# Patient Record
Sex: FEMALE | Race: WHITE | NOT HISPANIC OR LATINO | Employment: OTHER | URBAN - METROPOLITAN AREA
[De-identification: names, ages, dates, MRNs, and addresses within clinical notes are randomized per-mention and may not be internally consistent; named-entity substitution may affect disease eponyms.]

---

## 2017-01-25 RX ORDER — TRAMADOL HYDROCHLORIDE 50 MG/1
TABLET ORAL
Qty: 60 TAB | Refills: 0 | Status: SHIPPED | OUTPATIENT
Start: 2017-01-25 | End: 2017-02-13 | Stop reason: SDUPTHER

## 2017-01-27 ENCOUNTER — HOSPITAL ENCOUNTER (OUTPATIENT)
Dept: LAB | Facility: MEDICAL CENTER | Age: 77
End: 2017-01-27
Attending: INTERNAL MEDICINE
Payer: MEDICARE

## 2017-01-27 DIAGNOSIS — I10 ESSENTIAL HYPERTENSION, BENIGN: ICD-10-CM

## 2017-01-27 DIAGNOSIS — I51.7 CARDIOMEGALY: ICD-10-CM

## 2017-01-27 DIAGNOSIS — I35.0 AORTIC STENOSIS, SEVERE: ICD-10-CM

## 2017-01-27 DIAGNOSIS — I51.7 LVH (LEFT VENTRICULAR HYPERTROPHY): ICD-10-CM

## 2017-01-27 DIAGNOSIS — N28.9 RENAL INSUFFICIENCY: ICD-10-CM

## 2017-01-27 LAB
ALBUMIN SERPL BCP-MCNC: 5.2 G/DL (ref 3.2–4.9)
ALBUMIN/GLOB SERPL: 1.8 G/DL
ALP SERPL-CCNC: 132 U/L (ref 30–99)
ALT SERPL-CCNC: 11 U/L (ref 2–50)
ANION GAP SERPL CALC-SCNC: 8 MMOL/L (ref 0–11.9)
AST SERPL-CCNC: 20 U/L (ref 12–45)
BILIRUB SERPL-MCNC: 0.5 MG/DL (ref 0.1–1.5)
BUN SERPL-MCNC: 23 MG/DL (ref 8–22)
CALCIUM SERPL-MCNC: 9.7 MG/DL (ref 8.5–10.5)
CHLORIDE SERPL-SCNC: 108 MMOL/L (ref 96–112)
CHOLEST SERPL-MCNC: 115 MG/DL (ref 100–199)
CO2 SERPL-SCNC: 22 MMOL/L (ref 20–33)
CREAT SERPL-MCNC: 0.84 MG/DL (ref 0.5–1.4)
CREAT SERPL-MCNC: 0.85 MG/DL (ref 0.5–1.4)
GLOBULIN SER CALC-MCNC: 2.9 G/DL (ref 1.9–3.5)
GLUCOSE SERPL-MCNC: 90 MG/DL (ref 65–99)
HDLC SERPL-MCNC: 40 MG/DL
LDLC SERPL CALC-MCNC: 53 MG/DL
POTASSIUM SERPL-SCNC: 3.7 MMOL/L (ref 3.6–5.5)
POTASSIUM SERPL-SCNC: 3.7 MMOL/L (ref 3.6–5.5)
PROT SERPL-MCNC: 8.1 G/DL (ref 6–8.2)
SODIUM SERPL-SCNC: 138 MMOL/L (ref 135–145)
TRIGL SERPL-MCNC: 109 MG/DL (ref 0–149)

## 2017-01-27 PROCEDURE — 80053 COMPREHEN METABOLIC PANEL: CPT

## 2017-01-27 PROCEDURE — 80061 LIPID PANEL: CPT

## 2017-02-13 ENCOUNTER — OFFICE VISIT (OUTPATIENT)
Dept: MEDICAL GROUP | Facility: CLINIC | Age: 77
End: 2017-02-13
Payer: MEDICARE

## 2017-02-13 VITALS
OXYGEN SATURATION: 96 % | HEIGHT: 60 IN | DIASTOLIC BLOOD PRESSURE: 70 MMHG | SYSTOLIC BLOOD PRESSURE: 130 MMHG | WEIGHT: 120 LBS | RESPIRATION RATE: 16 BRPM | TEMPERATURE: 98.2 F | HEART RATE: 63 BPM | BODY MASS INDEX: 23.56 KG/M2

## 2017-02-13 DIAGNOSIS — R63.8 INCREASED BMI: ICD-10-CM

## 2017-02-13 DIAGNOSIS — E78.5 DYSLIPIDEMIA: ICD-10-CM

## 2017-02-13 DIAGNOSIS — I35.0 SEVERE AORTIC STENOSIS: ICD-10-CM

## 2017-02-13 DIAGNOSIS — M79.2 NEUROPATHIC PAIN: ICD-10-CM

## 2017-02-13 DIAGNOSIS — G43.909 MIGRAINE WITHOUT STATUS MIGRAINOSUS, NOT INTRACTABLE, UNSPECIFIED MIGRAINE TYPE: ICD-10-CM

## 2017-02-13 DIAGNOSIS — K21.9 GASTROESOPHAGEAL REFLUX DISEASE WITHOUT ESOPHAGITIS: ICD-10-CM

## 2017-02-13 DIAGNOSIS — I10 ESSENTIAL HYPERTENSION: ICD-10-CM

## 2017-02-13 PROCEDURE — 99214 OFFICE O/P EST MOD 30 MIN: CPT | Performed by: INTERNAL MEDICINE

## 2017-02-13 PROCEDURE — G8420 CALC BMI NORM PARAMETERS: HCPCS | Performed by: INTERNAL MEDICINE

## 2017-02-13 PROCEDURE — 1101F PT FALLS ASSESS-DOCD LE1/YR: CPT | Performed by: INTERNAL MEDICINE

## 2017-02-13 PROCEDURE — 4040F PNEUMOC VAC/ADMIN/RCVD: CPT | Performed by: INTERNAL MEDICINE

## 2017-02-13 PROCEDURE — 1036F TOBACCO NON-USER: CPT | Performed by: INTERNAL MEDICINE

## 2017-02-13 PROCEDURE — G8482 FLU IMMUNIZE ORDER/ADMIN: HCPCS | Performed by: INTERNAL MEDICINE

## 2017-02-13 PROCEDURE — G8432 DEP SCR NOT DOC, RNG: HCPCS | Performed by: INTERNAL MEDICINE

## 2017-02-13 RX ORDER — GABAPENTIN 600 MG/1
600 TABLET ORAL 3 TIMES DAILY
Qty: 90 TAB | Refills: 6 | Status: SHIPPED | OUTPATIENT
Start: 2017-02-13 | End: 2017-08-04 | Stop reason: SDUPTHER

## 2017-02-13 RX ORDER — OMEPRAZOLE 20 MG/1
20 TABLET, DELAYED RELEASE ORAL DAILY
Qty: 90 TAB | Refills: 3 | Status: SHIPPED | OUTPATIENT
Start: 2017-02-13 | End: 2018-01-19

## 2017-02-13 RX ORDER — SUMATRIPTAN 25 MG/1
25-100 TABLET, FILM COATED ORAL
Qty: 9 TAB | Refills: 3 | Status: SHIPPED | OUTPATIENT
Start: 2017-02-13 | End: 2017-07-09 | Stop reason: SDUPTHER

## 2017-02-13 RX ORDER — ATENOLOL AND CHLORTHALIDONE TABLET 100; 25 MG/1; MG/1
1 TABLET ORAL DAILY
Qty: 90 TAB | Refills: 6 | Status: SHIPPED | OUTPATIENT
Start: 2017-02-13 | End: 2017-10-31 | Stop reason: SDUPTHER

## 2017-02-13 RX ORDER — GEMFIBROZIL 600 MG/1
600 TABLET, FILM COATED ORAL 2 TIMES DAILY
Qty: 180 TAB | Refills: 3 | Status: SHIPPED | OUTPATIENT
Start: 2017-02-13 | End: 2018-01-12 | Stop reason: SDUPTHER

## 2017-02-13 RX ORDER — TRAMADOL HYDROCHLORIDE 50 MG/1
50 TABLET ORAL EVERY 8 HOURS PRN
Qty: 60 TAB | Refills: 3 | Status: SHIPPED | OUTPATIENT
Start: 2017-02-13 | End: 2017-03-01 | Stop reason: SDUPTHER

## 2017-02-13 ASSESSMENT — PATIENT HEALTH QUESTIONNAIRE - PHQ9: CLINICAL INTERPRETATION OF PHQ2 SCORE: 0

## 2017-02-13 NOTE — MR AVS SNAPSHOT
Tori Mcgowande   2017 9:40 AM   Office Visit   MRN: [id number]    Department:  [facility]   Dept Phone:  [phone]    Description:  Female : 1940   Provider:  Elizabeth Joyce M.D.           Allergies as of 2017     No Known Allergies      You were diagnosed with     Migraine without status migrainosus, not intractable, unspecified migraine type   [3001623]       Dyslipidemia   [376794]       Gastroesophageal reflux disease without esophagitis   [169823]       Neuropathic pain   [123978]       Essential hypertension   [9841616]         Vital Signs     Blood Pressure Pulse Temperature Respirations Height Weight    130/70 mmHg 63 36.8 °C (98.2 °F) 16 1.524 m (5') 54.432 kg (120 lb)    Body Mass Index Oxygen Saturation Smoking Status             23.44 kg/m2 96% Never Smoker          Basic Information     Date Of Birth Sex Race Ethnicity Preferred Language    1940 Female White Non- English      Your appointments     2017  3:15 PM   FOLLOW UP with David Ball M.D.   [facility] (--)    [address] [place] [id number] [phone]            Aug 04, 2017  9:20 AM   Established Patient with Elizabeth Joyce M.D.   [facility] ([facility])    [address] [facility] [address] [phone]           You will be receiving a confirmation call a few days before your appointment from our automated call confirmation system.              Problem List              ICD-10-CM Priority Class Noted - Resolved    Cardiomegaly I51.7   2013 - Present    Essential hypertension, benign I10   2013 - Present    Restless legs syndrome G25.81   2013 - Present    Dyslipidemia E78.5   3/4/2016 - Present    Aortic stenosis, severe I35.0   3/4/2016 - Present    Headache, migraine G43.909   3/4/2016 - Present    Health care maintenance Z00.00   3/4/2016 - Present    Combined forms of  age-related cataract of right eye H25.811   10/11/2016 - Present    Combined forms of age-related cataract of left eye H25.812   11/8/2016 - Present      Health Maintenance        Date Due Completion Dates    IMM DTaP/Tdap/Td Vaccine (1 - Tdap) 6/17/1958 ---    PAP SMEAR 6/17/1960 ---    MAMMOGRAM 6/17/1979 ---    COLONOSCOPY 6/17/1989 ---    IMM ZOSTER VACCINE 6/17/1999 ---    BONE DENSITY 6/17/2004 ---    IMM PNEUMOCOCCAL 65+ (ADULT) LOW/MEDIUM RISK SERIES (2 of 2 - PPSV23) 10/3/2017 10/3/2016            Current Immunizations     13-VALENT PCV PREVNAR 10/3/2016  7:39 AM    Influenza Vaccine Adult HD 9/8/2015 10:28 AM    Influenza Vaccine Quad Inj (Preserved) 9/5/2016      Below and/or attached are the medications your provider expects you to take. Review all of your home medications and newly ordered medications with your provider and/or pharmacist. Follow medication instructions as directed by your provider and/or pharmacist. Please keep your medication list with you and share with your provider. Update the information when medications are discontinued, doses are changed, or new medications (including over-the-counter products) are added; and carry medication information at all times in the event of emergency situations     Allergies:  No Known Allergies          Medications  Valid as of: February 13, 2017 -  9:57 AM    Generic Name Brand Name Tablet Size Instructions for use    Aspirin (Tablet Delayed Response) ECOTRIN 81 MG Take 81 mg by mouth every day.          Atenolol-Chlorthalidone (Tab) TENORETIC 100-25 MG Take 1 Tab by mouth every day.        Chlorphen-Pseudoeph-Methscop   Take  by mouth.        Cholecalciferol   Take 1 Cap by mouth every day.          Cyanocobalamin   Take  by mouth.        Esomeprazole Magnesium (CAPSULE DELAYED RELEASE) NEXIUM 20 MG Take 20 mg by mouth every morning before breakfast.        Gabapentin (Tab) NEURONTIN 600 MG Take 1 Tab by mouth 3 times a day.        Gemfibrozil (Tab)  LOPID 600 MG Take 1 Tab by mouth 2 times a day.        Multiple Vitamin (Tab) MULTI-VITAMIN  Take 1 Tab by mouth every day.          Omega-3 Fatty Acids   Take 1 Cap by mouth every day.          Omeprazole Magnesium (Tablet Delayed Response) PRILOSEC OTC 20 MG Take 1 Tab by mouth every day.        Potassium   Take 1 Tab by mouth every day.          SUMAtriptan Succinate (Tab) IMITREX 25 MG Take 1-4 Tabs by mouth Once PRN for Migraine for up to 1 dose.        TiZANidine HCl (Tab) ZANAFLEX 2 MG TAKE TWO TABLETS BY MOUTH TWICE DAILY        TraMADol HCl (Tab) ULTRAM 50 MG Take 1 Tab by mouth every 8 hours as needed.        Vitamin A   Take 1 Tab by mouth every day.          .                 Medicines prescribed today were sent to:     [id number] - [place], [place] - [address] [place] [address]    Phone: [phone] Fax: [phone]    Open 24 Hours?: No      Medication refill instructions:       If your prescription bottle indicates you have medication refills left, it is not necessary to call your provider’s office. Please contact your pharmacy and they will refill your medication.    If your prescription bottle indicates you do not have any refills left, you may request refills at any time through one of the following ways: The online [employer] system (except Urgent Care), by calling your provider’s office, or by asking your pharmacy to contact your provider’s office with a refill request. Medication refills are processed only during regular business hours and may not be available until the next business day. Your provider may request additional information or to have a follow-up visit with you prior to refilling your medication.   *Please Note: Medication refills are assigned a new Rx number when refilled electronically. Your pharmacy may indicate that no refills were authorized even though a new prescription for the same medication is available at the pharmacy. Please request the  medicine by name with the pharmacy before contacting your provider for a refill.           MyChart Status: Patient Declined

## 2017-02-13 NOTE — PROGRESS NOTES
Subjective:  Tori is a 76 y.o. female with the following   Past Medical History   Diagnosis Date   • GERD (gastroesophageal reflux disease)    • Restless leg syndrome      Dr. adler    • Urinary tract infection, site not specified    • Hypertension    • Hyperlipidemia    • Heart murmur    • Aortic stenosis    • Arthritis    • Dental disorder    • Breath shortness    • Stroke (CMS-HCC)      had in 1970s   • Pain 10/7/16     lower back, hips and legs      Family History   Problem Relation Age of Onset   • Adopted: Yes     The patient is on the following medications:   Current outpatient prescriptions:   •  tramadol (ULTRAM) 50 MG Tab, TAKE ONE OR TWO TABLETS BY MOUTH TWICE DAILY AS NEEDED, Disp: 60 Tab, Rfl: 0  •  SUMAtriptan (IMITREX) 25 MG Tab tablet, TAKE 1 TABLET BY MOUTH ONCE AS NEEDED FOR MIGRAINE FOR UP TO 1 DOSE, Disp: 9 Tab, Rfl: 0  •  tizanidine (ZANAFLEX) 2 MG tablet, TAKE TWO TABLETS BY MOUTH TWICE DAILY, Disp: 60 Tab, Rfl: 2  •  gabapentin (NEURONTIN) 600 MG tablet, TAKE ONE TABLET BY MOUTH THREE TIMES DAILY, Disp: 90 Tab, Rfl: 3  •  esomeprazole (NEXIUM) 20 MG capsule, Take 20 mg by mouth every morning before breakfast., Disp: , Rfl:   •  Chlorphen-Pseudoeph-Methscop (ALLERGY AM/PM PO), Take  by mouth., Disp: , Rfl:   •  atenolol-chlorthalidone (TENORETIC) 100-25 MG per tablet, Take 1 Tab by mouth every day., Disp: 90 Tab, Rfl: 3  •  gemfibrozil (LOPID) 600 MG Tab, Take 1 Tab by mouth 2 times a day., Disp: 180 Tab, Rfl: 3  •  omeprazole (PRILOSEC OTC) 20 MG tablet, Take 1 Tab by mouth every day., Disp: 90 Tab, Rfl: 3  •  Cyanocobalamin (VITAMIN B 12 PO), Take  by mouth., Disp: , Rfl:   •  aspirin EC (ECOTRIN) 81 MG TBEC, Take 81 mg by mouth every day.  , Disp: , Rfl:   •  Cholecalciferol (VITAMIN D-3 PO), Take 1 Cap by mouth every day.  , Disp: , Rfl:   •  Omega-3 Fatty Acids (FISH OIL PO), Take 1 Cap by mouth every day.  , Disp: , Rfl:   •  Multiple Vitamin (MULTI-VITAMIN) TABS, Take 1 Tab by mouth  every day.  , Disp: , Rfl:   •  POTASSIUM PO, Take 1 Tab by mouth every day.  , Disp: , Rfl:   •  VITAMIN A PO, Take 1 Tab by mouth every day.  , Disp: , Rfl:     HPI; Patient is here today for follow-up visit regarding her recent labs, currently on gabapentin, tramadol and Zanaflex as needed for chronic arthritic and back pain denies any motor or sensory loss, on Tenoretic for hypertension denies having palpitation or urinary frequency, on omeprazole for GERD denies having dysphagia or abdominal pain, on Lopid for dyslipidemia denies having fatigue. Patient has severe aortic stenosis, has decided against any invasive procedure denies having shortness of breath with ambulation.   ROS:  See HPI    Blood pressure 130/70, pulse 63, temperature 36.8 °C (98.2 °F), resp. rate 16, height 1.524 m (5'), weight 54.432 kg (120 lb), SpO2 96 %.on RA  Objective:  Patient is well appearing and in no acute distress.  Pharynx is clear.  Neck is soft and supple with no cervical or supraclavicular lymphadenopathy, thyromegaly or masses, no JVD.  Lungs clear to auscultation bilaterally with normal respiratory effort. Abdomen soft, non-tender on palpation,not distended. Heart regular rate and rhythm with systolic ejection murmur. Extremities without any clubbing, cyanosis, or edema.    Assessment and Plan:  1. Severe Aortic stenosis; status post cardiology consultation, patient has preferred no surgical intervention, denies having dyspnea or chest pain with daily walking.       2. Chronic pain;  multiple joints including back, responds to gabapentin 600 mg tablets , tramadol 50 mg and Zanaflex 2 mg as needed.      3. Migraine headache; patient recently has required Imitrex or tramadol for headache requesting refill of medications.      4. GERD; responds to omeprazole 20 mg daily.      5. HTN/ borderline hypokalemia; currently on Tenoretic 100-25 milligrams daily.    Ref. Range 5/13/2016 07:37 8/5/2016 07:37 1/27/2017 08:00 1/27/2017  08:02   Potassium Latest Ref Range: 3.6-5.5 mmol/L 3.5 (L) 3.8 3.7 3.7       6. Dyslipidemia;on  Lopid 600 mg by mouth twice a day   Ref. Range 1/27/2017 08:02   Cholesterol,Tot Latest Ref Range: 100-199 mg/dL 115   Triglycerides Latest Ref Range: 0-149 mg/dL 109   HDL Latest Ref Range: >=40 mg/dL 40   LDL Latest Ref Range: <100 mg/dL 53         7. Renal insufficiency   Ref. Range 5/13/2016 07:37 8/5/2016 07:37 1/27/2017 08:00 1/27/2017 08:02   Creatinine Latest Ref Range: 0.50-1.40 mg/dL 0.94 0.81 0.85 0.84   GFR If  Latest Ref Range: >60 mL/min/1.73 m 2 >60 >60 >60 >60   GFR If Non  Latest Ref Range: >60 mL/min/1.73 m 2 58 (A) >60 >60 >60       8. Increased BMI; patient is down 14 pounds with diet and lifestyle modification.     Patient is advised in preventive and supportive care, diet and lifestyle modification, daily walking ,exercise ,  increase fluid intake, alternative therapies, refill medication.       Please note that this dictation was created using voice recognition software. I have worked with consultants from the vendor as well as technical experts from [facility] [employer] to optimize the interface. I have made every reasonable attempt to correct obvious errors, but I expect that there are errors of grammar and possibly content that I did not discover before finalizing the note.

## 2017-02-24 ENCOUNTER — OFFICE VISIT (OUTPATIENT)
Dept: URGENT CARE | Facility: CLINIC | Age: 77
End: 2017-02-24
Payer: MEDICARE

## 2017-02-24 ENCOUNTER — APPOINTMENT (OUTPATIENT)
Dept: RADIOLOGY | Facility: IMAGING CENTER | Age: 77
End: 2017-02-24
Attending: NURSE PRACTITIONER
Payer: MEDICARE

## 2017-02-24 VITALS
DIASTOLIC BLOOD PRESSURE: 96 MMHG | RESPIRATION RATE: 16 BRPM | WEIGHT: 120 LBS | SYSTOLIC BLOOD PRESSURE: 150 MMHG | BODY MASS INDEX: 23.44 KG/M2 | HEART RATE: 66 BPM | OXYGEN SATURATION: 96 % | TEMPERATURE: 97.9 F

## 2017-02-24 DIAGNOSIS — R05.8 PRODUCTIVE COUGH: ICD-10-CM

## 2017-02-24 DIAGNOSIS — H61.22 IMPACTED CERUMEN OF LEFT EAR: ICD-10-CM

## 2017-02-24 DIAGNOSIS — J22 LOWER RESPIRATORY INFECTION (E.G., BRONCHITIS, PNEUMONIA, PNEUMONITIS, PULMONITIS): ICD-10-CM

## 2017-02-24 DIAGNOSIS — R09.81 NASAL CONGESTION WITH RHINORRHEA: ICD-10-CM

## 2017-02-24 DIAGNOSIS — J34.89 NASAL CONGESTION WITH RHINORRHEA: ICD-10-CM

## 2017-02-24 PROCEDURE — 1101F PT FALLS ASSESS-DOCD LE1/YR: CPT | Performed by: NURSE PRACTITIONER

## 2017-02-24 PROCEDURE — 4040F PNEUMOC VAC/ADMIN/RCVD: CPT | Performed by: NURSE PRACTITIONER

## 2017-02-24 PROCEDURE — 1036F TOBACCO NON-USER: CPT | Performed by: NURSE PRACTITIONER

## 2017-02-24 PROCEDURE — 71020 DX-CHEST-2 VIEWS: CPT | Mod: TC | Performed by: NURSE PRACTITIONER

## 2017-02-24 PROCEDURE — 99204 OFFICE O/P NEW MOD 45 MIN: CPT | Mod: 25 | Performed by: NURSE PRACTITIONER

## 2017-02-24 PROCEDURE — 69210 REMOVE IMPACTED EAR WAX UNI: CPT | Performed by: NURSE PRACTITIONER

## 2017-02-24 PROCEDURE — G8482 FLU IMMUNIZE ORDER/ADMIN: HCPCS | Performed by: NURSE PRACTITIONER

## 2017-02-24 PROCEDURE — G8420 CALC BMI NORM PARAMETERS: HCPCS | Performed by: NURSE PRACTITIONER

## 2017-02-24 RX ORDER — ALBUTEROL SULFATE 90 UG/1
2 AEROSOL, METERED RESPIRATORY (INHALATION) EVERY 6 HOURS PRN
Qty: 8.5 G | Refills: 0 | Status: SHIPPED | OUTPATIENT
Start: 2017-02-24 | End: 2017-08-04

## 2017-02-24 RX ORDER — DOXYCYCLINE HYCLATE 100 MG
100 TABLET ORAL 2 TIMES DAILY
Qty: 20 TAB | Refills: 0 | Status: CANCELLED | OUTPATIENT
Start: 2017-02-24

## 2017-02-24 RX ORDER — AMOXICILLIN AND CLAVULANATE POTASSIUM 875; 125 MG/1; MG/1
1 TABLET, FILM COATED ORAL 2 TIMES DAILY
Qty: 20 TAB | Refills: 0 | Status: SHIPPED | OUTPATIENT
Start: 2017-02-24 | End: 2017-03-06

## 2017-02-24 ASSESSMENT — ENCOUNTER SYMPTOMS
BACK PAIN: 0
VOMITING: 0
CHILLS: 0
FEVER: 0
EYE REDNESS: 0
ORTHOPNEA: 0
SHORTNESS OF BREATH: 0
HEADACHES: 0
WHEEZING: 0
PALPITATIONS: 0
CONSTIPATION: 0
COUGH: 1
WEAKNESS: 0
DIZZINESS: 0
EYE DISCHARGE: 0
NAUSEA: 0
NECK PAIN: 0
MYALGIAS: 0
DIARRHEA: 0
SPUTUM PRODUCTION: 1
SORE THROAT: 0
ABDOMINAL PAIN: 0

## 2017-02-24 NOTE — MR AVS SNAPSHOT
Tori Ann Bennett   2017 9:30 AM   Office Visit   MRN: [id number]    Department:  [facility]   Dept Phone:  [phone]    Description:  Female : 1940   Provider:  RAY Yang           Reason for Visit     Cough x 2 days, some productive cough    Sinus Problem x 1 day, nasal congestion, stuffy and runny nose, headaches and lt.ear fullness      Allergies as of 2017     No Known Allergies      You were diagnosed with     Productive cough   [599199]       Impacted cerumen of left ear   [397842]       Nasal congestion with rhinorrhea   [051761]       Lower respiratory infection (e.g., bronchitis, pneumonia, pneumonitis, pulmonitis)   [052730]         Vital Signs     Blood Pressure Pulse Temperature Respirations Weight Oxygen Saturation    150/96 mmHg 66 36.6 °C (97.9 °F) 16 54.432 kg (120 lb) 96%    Smoking Status                   Never Smoker            Basic Information     Date Of Birth Sex Race Ethnicity Preferred Language    1940 Female White Non- English      Your appointments     Aug 04, 2017  9:20 AM   Established Patient with Elizabeth Joyce M.D.   [facility]    [address] [facility] [address] [phone]           You will be receiving a confirmation call a few days before your appointment from our automated call confirmation system.              Problem List              ICD-10-CM Priority Class Noted - Resolved    Cardiomegaly I51.7   2013 - Present    Essential hypertension, benign I10   2013 - Present    Restless legs syndrome G25.81   2013 - Present    Dyslipidemia E78.5   3/4/2016 - Present    Aortic stenosis, severe I35.0   3/4/2016 - Present    Headache, migraine G43.909   3/4/2016 - Present    Health care maintenance Z00.00   3/4/2016 - Present    Combined forms of age-related cataract of right eye H25.811   10/11/2016 - Present    Combined forms of age-related cataract of  left eye H25.812   11/8/2016 - Present      Health Maintenance        Date Due Completion Dates    IMM DTaP/Tdap/Td Vaccine (1 - Tdap) 6/17/1958 ---    PAP SMEAR 6/17/1960 ---    MAMMOGRAM 6/17/1979 ---    COLONOSCOPY 6/17/1989 ---    IMM ZOSTER VACCINE 6/17/1999 ---    BONE DENSITY 6/17/2004 ---    IMM PNEUMOCOCCAL 65+ (ADULT) LOW/MEDIUM RISK SERIES (2 of 2 - PPSV23) 10/3/2017 10/3/2016            Current Immunizations     13-VALENT PCV PREVNAR 10/3/2016  7:39 AM    Influenza Vaccine Adult HD 9/8/2015 10:28 AM    Influenza Vaccine Quad Inj (Preserved) 9/5/2016      Below and/or attached are the medications your provider expects you to take. Review all of your home medications and newly ordered medications with your provider and/or pharmacist. Follow medication instructions as directed by your provider and/or pharmacist. Please keep your medication list with you and share with your provider. Update the information when medications are discontinued, doses are changed, or new medications (including over-the-counter products) are added; and carry medication information at all times in the event of emergency situations     Allergies:  No Known Allergies          Medications  Valid as of: February 24, 2017 - 11:25 AM    Generic Name Brand Name Tablet Size Instructions for use    Albuterol Sulfate (Aero Soln) albuterol 108 (90 BASE) MCG/ACT Inhale 2 Puffs by mouth every 6 hours as needed for Shortness of Breath.        Amoxicillin-Pot Clavulanate (Tab) AUGMENTIN 875-125 MG Take 1 Tab by mouth 2 times a day for 10 days.        Aspirin (Tablet Delayed Response) ECOTRIN 81 MG Take 81 mg by mouth every day.          Atenolol-Chlorthalidone (Tab) TENORETIC 100-25 MG Take 1 Tab by mouth every day.        Chlorphen-Pseudoeph-Methscop   Take  by mouth.        Cholecalciferol   Take 1 Cap by mouth every day.          Cyanocobalamin   Take  by mouth.        Esomeprazole Magnesium (CAPSULE DELAYED RELEASE) NEXIUM 20 MG Take 20 mg by  mouth every morning before breakfast.        Gabapentin (Tab) NEURONTIN 600 MG Take 1 Tab by mouth 3 times a day.        Gemfibrozil (Tab) LOPID 600 MG Take 1 Tab by mouth 2 times a day.        Multiple Vitamin (Tab) MULTI-VITAMIN  Take 1 Tab by mouth every day.          Omega-3 Fatty Acids   Take 1 Cap by mouth every day.          Omeprazole Magnesium (Tablet Delayed Response) PRILOSEC OTC 20 MG Take 1 Tab by mouth every day.        Potassium   Take 1 Tab by mouth every day.          SUMAtriptan Succinate (Tab) IMITREX 25 MG Take 1-4 Tabs by mouth Once PRN for Migraine for up to 1 dose.        TiZANidine HCl (Tab) ZANAFLEX 2 MG TAKE TWO TABLETS BY MOUTH TWICE DAILY        TraMADol HCl (Tab) ULTRAM 50 MG Take 1 Tab by mouth every 8 hours as needed.        Vitamin A   Take 1 Tab by mouth every day.          .                 Medicines prescribed today were sent to:     [facility] - [place], [place] - [address] [address]    Phone: [phone] Fax: [phone]    Open 24 Hours?: No      Medication refill instructions:       If your prescription bottle indicates you have medication refills left, it is not necessary to call your provider’s office. Please contact your pharmacy and they will refill your medication.    If your prescription bottle indicates you do not have any refills left, you may request refills at any time through one of the following ways: The online [employer] system (except Urgent Care), by calling your provider’s office, or by asking your pharmacy to contact your provider’s office with a refill request. Medication refills are processed only during regular business hours and may not be available until the next business day. Your provider may request additional information or to have a follow-up visit with you prior to refilling your medication.   *Please Note: Medication refills are assigned a new Rx number when refilled electronically. Your pharmacy may  indicate that no refills were authorized even though a new prescription for the same medication is available at the pharmacy. Please request the medicine by name with the pharmacy before contacting your provider for a refill.        Your To Do List     Future Labs/Procedures Complete By Expires    DX-CHEST-2 VIEWS  As directed 2/24/2018         MyChart Status: Patient Declined

## 2017-02-24 NOTE — PROGRESS NOTES
"Subjective:      Tori Guajardo is a 76 y.o. female who presents with Cough and Sinus Problem            Cough  Associated symptoms include ear pain. Pertinent negatives include no chest pain, chills, eye redness, fever, headaches, myalgias, sore throat, shortness of breath or wheezing. There is no history of environmental allergies.   Sinus Problem  Associated symptoms include congestion, coughing and ear pain. Pertinent negatives include no chills, headaches, neck pain, shortness of breath or sore throat.   Tori is a 76 year old female who is here for produtive cough and sinus problems x 2 days. States had sore throat and increased nasal congestion x 1 week. C/o nasal congestion with thick yellow mucus without sinus facial pressure or sinus HA. C/o productive cough without SOB or chest tightness, denies asthma, smoking. States taking Coricidin D. States unable to get \"much up with cough\" and states \"feels like there is stuff in my chest\". Denies CP or fever. C/o decreased hearing in left ear. Denies sore throat.    PMH:  has a past medical history of GERD (gastroesophageal reflux disease); Restless leg syndrome; Urinary tract infection, site not specified; Hypertension; Hyperlipidemia; Heart murmur; Aortic stenosis; Arthritis; Dental disorder; Breath shortness; Stroke (CMS-[facility]); and Pain (10/7/16). She also has no past medical history of Allergy or Diabetes.  MEDS:   Current outpatient prescriptions:   •  tramadol (ULTRAM) 50 MG Tab, Take 1 Tab by mouth every 8 hours as needed., Disp: 60 Tab, Rfl: 3  •  SUMAtriptan (IMITREX) 25 MG Tab tablet, Take 1-4 Tabs by mouth Once PRN for Migraine for up to 1 dose., Disp: 9 Tab, Rfl: 3  •  gabapentin (NEURONTIN) 600 MG tablet, Take 1 Tab by mouth 3 times a day., Disp: 90 Tab, Rfl: 6  •  atenolol-chlorthalidone (TENORETIC) 100-25 MG per tablet, Take 1 Tab by mouth every day., Disp: 90 Tab, Rfl: 6  •  gemfibrozil (LOPID) 600 MG Tab, Take 1 Tab by mouth 2 times a day., " Disp: 180 Tab, Rfl: 3  •  omeprazole (PRILOSEC OTC) 20 MG tablet, Take 1 Tab by mouth every day., Disp: 90 Tab, Rfl: 3  •  tizanidine (ZANAFLEX) 2 MG tablet, TAKE TWO TABLETS BY MOUTH TWICE DAILY, Disp: 60 Tab, Rfl: 2  •  esomeprazole (NEXIUM) 20 MG capsule, Take 20 mg by mouth every morning before breakfast., Disp: , Rfl:   •  Chlorphen-Pseudoeph-Methscop (ALLERGY AM/PM PO), Take  by mouth., Disp: , Rfl:   •  Cyanocobalamin (VITAMIN B 12 PO), Take  by mouth., Disp: , Rfl:   •  aspirin EC (ECOTRIN) 81 MG TBEC, Take 81 mg by mouth every day.  , Disp: , Rfl:   •  Cholecalciferol (VITAMIN D-3 PO), Take 1 Cap by mouth every day.  , Disp: , Rfl:   •  Omega-3 Fatty Acids (FISH OIL PO), Take 1 Cap by mouth every day.  , Disp: , Rfl:   •  Multiple Vitamin (MULTI-VITAMIN) TABS, Take 1 Tab by mouth every day.  , Disp: , Rfl:   •  POTASSIUM PO, Take 1 Tab by mouth every day.  , Disp: , Rfl:   •  VITAMIN A PO, Take 1 Tab by mouth every day.  , Disp: , Rfl:   ALLERGIES: No Known Allergies  SURGHX:   Past Surgical History   Procedure Laterality Date   • Tubal coagulation laparoscopic bilateral  1970   • Cataract phaco with iol Right 10/11/2016     Procedure: CATARACT PHACO WITH IOL;  Surgeon: Sam Whatley M.D.;  Location: [facility]ERY [facility];  Service:    • Cataract phaco with iol Left 11/8/2016     Procedure: CATARACT PHACO WITH IOL;  Surgeon: Sam Whatley M.D.;  Location: [facility]ERY [facility];  Service:      SOCHX:  reports that she has never smoked. She has never used smokeless tobacco. She reports that she does not drink alcohol or use illicit drugs.  FH: Family history was reviewed, no pertinent findings to report        Review of Systems   Constitutional: Negative for fever, chills and malaise/fatigue.   HENT: Positive for congestion and ear pain. Negative for sore throat.    Eyes: Negative for discharge and redness.   Respiratory: Positive for cough and sputum production. Negative for shortness of  breath and wheezing.    Cardiovascular: Negative for chest pain, palpitations and orthopnea.   Gastrointestinal: Negative for nausea, vomiting, abdominal pain, diarrhea and constipation.   Musculoskeletal: Negative for myalgias, back pain and neck pain.   Neurological: Negative for dizziness, weakness and headaches.   Endo/Heme/Allergies: Negative for environmental allergies.          Objective:     There were no vitals taken for this visit.     Physical Exam   Constitutional: She is oriented to person, place, and time. Vital signs are normal. She appears well-developed and well-nourished.  Non-toxic appearance. She does not have a sickly appearance. She does not appear ill. No distress.   HENT:   Head: Normocephalic.   Right Ear: External ear and ear canal normal. No drainage, swelling or tenderness. No middle ear effusion.   Left Ear: External ear and ear canal normal. No drainage, swelling or tenderness.   Nose: Mucosal edema and rhinorrhea present. No sinus tenderness.   Mouth/Throat: Oropharynx is clear and moist and mucous membranes are normal. No uvula swelling.   Ears: Leti pinnae. Left external auditory canals with some occlusion with impacted cerumen. Procedure: Pt then prepped and lavaged by MA and residual wax curetted by Provider with resolution of impaction.   TM pearly gray with normal light reflex bilaterally.         Eyes: Conjunctivae and EOM are normal. Pupils are equal, round, and reactive to light.   Neck: Normal range of motion. Neck supple.   Cardiovascular: Normal rate and regular rhythm.    Pulmonary/Chest: Effort normal. No accessory muscle usage. No respiratory distress. She has no decreased breath sounds. She has no wheezes. She has no rhonchi. She has no rales.   Musculoskeletal: Normal range of motion.   Lymphadenopathy:     She has no cervical adenopathy.   Neurological: She is alert and oriented to person, place, and time.   Skin: Skin is warm and dry. She is not diaphoretic.    Vitals reviewed.    CXR:  FINDINGS:  The cardiac silhouette  and mediastinal contours are normal. Calcifications are in the aortic knob.  There is mild right basilar atelectasis. No pleural fluid or pneumothorax.  Degenerative changes are in the spine          Assessment/Plan:     1. Productive cough    - DX-CHEST-2 VIEWS; Future    2. Impacted cerumen of left ear    - WA REMOVE IMPACTED EAR WAX    3. Nasal congestion with rhinorrhea    4. Lower respiratory infection (e.g., bronchitis, pneumonia, pneumonitis, pulmonitis)    - amoxicillin-clavulanate (AUGMENTIN) 875-125 MG Tab; Take 1 Tab by mouth 2 times a day for 10 days.  Dispense: 20 Tab; Refill: 0    Increase water intake  May use Ibuprofen/Tylenol prn for fever or body aches  Get rest  May use daily longer acting antihistamine like Claritin for allerg -like seasonal allergies prn  May use saline nasal spray prn to flush any nasal congestion  May continue to use expectorant prn for productive cough like Coricidin cough and cold  Monitor for fevers, productive cough, SOB, CP, chest tightness- need re-evaluation

## 2017-03-01 RX ORDER — TRAMADOL HYDROCHLORIDE 50 MG/1
TABLET ORAL
Qty: 60 TAB | Refills: 3 | Status: SHIPPED
Start: 2017-03-01 | End: 2017-07-07 | Stop reason: SDUPTHER

## 2017-03-27 RX ORDER — TIZANIDINE 2 MG/1
TABLET ORAL
Qty: 60 TAB | Refills: 0 | Status: SHIPPED | OUTPATIENT
Start: 2017-03-27 | End: 2017-04-21 | Stop reason: SDUPTHER

## 2017-04-21 RX ORDER — TIZANIDINE 2 MG/1
TABLET ORAL
Qty: 60 TAB | Refills: 0 | Status: SHIPPED | OUTPATIENT
Start: 2017-04-21 | End: 2017-05-19 | Stop reason: SDUPTHER

## 2017-05-19 RX ORDER — TIZANIDINE 2 MG/1
TABLET ORAL
Qty: 60 TAB | Refills: 0 | Status: SHIPPED | OUTPATIENT
Start: 2017-05-19 | End: 2017-07-07 | Stop reason: SDUPTHER

## 2017-06-02 ENCOUNTER — PATIENT OUTREACH (OUTPATIENT)
Dept: HEALTH INFORMATION MANAGEMENT | Facility: OTHER | Age: 77
End: 2017-06-02

## 2017-06-02 NOTE — PROGRESS NOTES
Attempt #:1    WebIZ Checked & Epic Updated: yes  [facility] Verified: yes  Verify PCP: yes    Communication Preference Obtained: yes     Review Care Team: yes    Annual Wellness Visit Scheduling  1. Scheduling Status:Scheduled    Care Gap Scheduling      Health Maintenance Due   Topic Date Due   • Annual Wellness Visit  SCHEDULED   • PAP SMEAR  STRONGLY DECLINED   • MAMMOGRAM  STRONGLY DECLINED   • COLONOSCOPY  STRONGLY DECLINED   • IMM ZOSTER VACCINE  REPORTS HAVING DONE ALREADY, HM UPDATED   • BONE DENSITY  DECLINED         MyChart Activation: NOT DISCUSSED DURING THIS PHONE CALL  MyChart Rhona: no  Virtual Visits: no  Opt In to Text Messages: no

## 2017-06-16 ENCOUNTER — HOSPITAL ENCOUNTER (OUTPATIENT)
Facility: MEDICAL CENTER | Age: 77
End: 2017-06-16
Payer: MEDICARE

## 2017-06-16 PROCEDURE — 82274 ASSAY TEST FOR BLOOD FECAL: CPT

## 2017-06-28 LAB — HEMOCCULT STL QL IA: NEGATIVE

## 2017-07-06 ENCOUNTER — TELEPHONE (OUTPATIENT)
Dept: MEDICAL GROUP | Facility: PHYSICIAN GROUP | Age: 77
End: 2017-07-06

## 2017-07-06 NOTE — TELEPHONE ENCOUNTER
Left message for patient to call back regarding pre-visit planning. Please transfer call to Kassidy @ [phone]

## 2017-07-07 ENCOUNTER — OFFICE VISIT (OUTPATIENT)
Dept: MEDICAL GROUP | Facility: PHYSICIAN GROUP | Age: 77
End: 2017-07-07
Payer: MEDICARE

## 2017-07-07 VITALS
DIASTOLIC BLOOD PRESSURE: 80 MMHG | HEART RATE: 66 BPM | SYSTOLIC BLOOD PRESSURE: 130 MMHG | BODY MASS INDEX: 25.49 KG/M2 | OXYGEN SATURATION: 96 % | WEIGHT: 118.17 LBS | RESPIRATION RATE: 16 BRPM | TEMPERATURE: 97.8 F | HEIGHT: 57 IN

## 2017-07-07 DIAGNOSIS — G43.909 MIGRAINE WITHOUT STATUS MIGRAINOSUS, NOT INTRACTABLE, UNSPECIFIED MIGRAINE TYPE: ICD-10-CM

## 2017-07-07 DIAGNOSIS — I35.0 AORTIC STENOSIS, SEVERE: ICD-10-CM

## 2017-07-07 DIAGNOSIS — M54.50 BILATERAL LOW BACK PAIN WITHOUT SCIATICA, UNSPECIFIED CHRONICITY: ICD-10-CM

## 2017-07-07 DIAGNOSIS — I10 ESSENTIAL HYPERTENSION, BENIGN: ICD-10-CM

## 2017-07-07 DIAGNOSIS — E78.5 DYSLIPIDEMIA: ICD-10-CM

## 2017-07-07 RX ORDER — TIZANIDINE 2 MG/1
4 TABLET ORAL 2 TIMES DAILY PRN
Qty: 60 TAB | Refills: 3 | Status: SHIPPED | OUTPATIENT
Start: 2017-07-07 | End: 2017-09-27 | Stop reason: SDUPTHER

## 2017-07-07 RX ORDER — TRAMADOL HYDROCHLORIDE 50 MG/1
50 TABLET ORAL 2 TIMES DAILY PRN
Qty: 60 TAB | Refills: 3 | Status: SHIPPED | OUTPATIENT
Start: 2017-07-07 | End: 2017-08-04 | Stop reason: SDUPTHER

## 2017-07-07 ASSESSMENT — ACTIVITIES OF DAILY LIVING (ADL): SHOPPING_COMMENTS: PT USES WALKER

## 2017-07-07 ASSESSMENT — PATIENT HEALTH QUESTIONNAIRE - PHQ9: CLINICAL INTERPRETATION OF PHQ2 SCORE: 0

## 2017-07-07 NOTE — PROGRESS NOTES
Chief Complaint   Patient presents with   • Annual Wellness Visit         HPI:  Tori Guajardo is a 77 y.o. female here for Medicare Annual Wellness Visit, patient has had history of severe aortic stenosis, per patient after diet and lifestyle modification and weight loss she can get around without difficulty, shortness of breath or chest pain. Patient is also on  Tenoretic for hypertension denies having palpitation, fatigue or urinary frequency, Lopid for dyslipidemia , on tramadol, Zanaflex and gabapentin for chronic pain.        Patient Active Problem List    Diagnosis Date Noted   • Combined forms of age-related cataract of left eye 11/08/2016   • Combined forms of age-related cataract of right eye 10/11/2016   • Dyslipidemia 03/04/2016   • Aortic stenosis, severe 03/04/2016   • Headache, migraine 03/04/2016   • Health care maintenance 03/04/2016   • Cardiomegaly 01/17/2013   • Essential hypertension, benign 01/17/2013   • Restless legs syndrome 01/17/2013       Current Outpatient Prescriptions   Medication Sig Dispense Refill   • tizanidine (ZANAFLEX) 2 MG tablet TAKE TWO TABLETS BY MOUTH TWICE DAILY 60 Tab 0   • tramadol (ULTRAM) 50 MG Tab TAKE ONE OR TWO TABLETS BY MOUTH TWICE DAILY AS NEEDED 60 Tab 3   • albuterol 108 (90 BASE) MCG/ACT Aero Soln inhalation aerosol Inhale 2 Puffs by mouth every 6 hours as needed for Shortness of Breath. 8.5 g 0   • SUMAtriptan (IMITREX) 25 MG Tab tablet Take 1-4 Tabs by mouth Once PRN for Migraine for up to 1 dose. 9 Tab 3   • gabapentin (NEURONTIN) 600 MG tablet Take 1 Tab by mouth 3 times a day. 90 Tab 6   • atenolol-chlorthalidone (TENORETIC) 100-25 MG per tablet Take 1 Tab by mouth every day. 90 Tab 6   • gemfibrozil (LOPID) 600 MG Tab Take 1 Tab by mouth 2 times a day. 180 Tab 3   • omeprazole (PRILOSEC OTC) 20 MG tablet Take 1 Tab by mouth every day. 90 Tab 3   • esomeprazole (NEXIUM) 20 MG capsule Take 20 mg by mouth every morning before breakfast.     •  Chlorphen-Pseudoeph-Methscop (ALLERGY AM/PM PO) Take  by mouth.     • Cyanocobalamin (VITAMIN B 12 PO) Take  by mouth.     • aspirin EC (ECOTRIN) 81 MG TBEC Take 81 mg by mouth every day.       • Cholecalciferol (VITAMIN D-3 PO) Take 1 Cap by mouth every day.       • Omega-3 Fatty Acids (FISH OIL PO) Take 1 Cap by mouth every day.       • Multiple Vitamin (MULTI-VITAMIN) TABS Take 1 Tab by mouth every day.       • POTASSIUM PO Take 1 Tab by mouth every day.       • VITAMIN A PO Take 1 Tab by mouth every day.         No current facility-administered medications for this visit.        Patient is taking medications as noted in medication list.  Current supplements as per medication list.   Chronic narcotic pain medicines: yes    Allergies: Review of patient's allergies indicates no known allergies.    [facility]activities[facility]     Is patient current with immunizations?  Yes.     She  reports that she has never smoked. She has never used smokeless tobacco. She reports that she does not drink alcohol or use illicit drugs.  Counseling given: Not Answered        DPA/Advanced Directive:  Patient has Advanced Directive on file.     ROS:    Gait: Uses a walker   Ostomy: no   Other tubes: no   Amputations: no   Chronic oxygen use: no   Last eye exam: 11/2016   Wears hearing aids: no   : Reports incontinence.       Screening:      Little interest or pleasure in doing things?  0 - not at all  Feeling down, depressed, or hopeless?  0 - not at all  Patient Health Questionnaire Score: 0  If depressive symptoms identified deferred to follow up visit unless specifically addressed in assessment and plan.    Interpretation of PHQ-9 Total Score   Score Severity   1-4 Minimal Depression   5-9 Mild Depression   10-14 Moderate Depression   15-19 Moderately Severe Depression   20-27 Severe Depression    Screening for Cognitive Impairment    Three Minute Recall (banana, sunrise, fence)  3/3    Draw clock face with  all 12 numbers set to the hand to show 10 minutes past 11 o'clock  1 4/5  If cognitive concerns identified deferred to follow up visit unless specifically addressed in assessment and plan.    Fall Risk Assessment    Has the patient had two or more falls in the last year or any fall with injury in the last year?  No  If Fall Risk identified deferred to follow up visit unless specifically addressed in assessment and plan.    Safety Assessment    Throw rugs on floor.  Yes  Handrails on all stairs.  Yes  Good lighting in all hallways.  Yes  Difficulty hearing.  No  Patient counseled about all safety risks that were identified.    Functional Assessment ADLs    Are there any barriers preventing you from cooking for yourself or meeting nutritional needs?  No.    Are there any barriers preventing you from driving safely or obtaining transportation?  No.    Are there any barriers preventing you from using a telephone or calling for help?  No.    Are there any barriers preventing you from shopping?  Yes. Pt uses walker  Are there any barriers preventing you from taking care of your own finances?  No.    Are there any barriers preventing you from managing your medications?  No.    Are currently engaging any exercise or physical activity?  Yes.  walking    Health Maintenance Summary                Annual Wellness Visit Overdue 1940     PAP SMEAR Overdue 6/17/1960     MAMMOGRAM Postponed 12/2/2017 Originally 6/17/1979. Patient Refused    BONE DENSITY Postponed 12/2/2017 Originally 6/17/2004. Patient Refused    COLONOSCOPY Postponed 12/2/2017 Originally 6/17/1989. Patient Refused    IMM INFLUENZA Next Due 9/1/2017      Done 9/5/2016 Imm Admin: Influenza Vaccine Quad Inj (Preserved)     Patient has more history with this topic...    IMM PNEUMOCOCCAL 65+ (ADULT) LOW/MEDIUM RISK SERIES Next Due 10/3/2017      Done 10/3/2016 Imm Admin: Pneumococcal Conjugate Vaccine (Prevnar/PCV-13)    IMM DTaP/Tdap/Td Vaccine Next Due 4/4/2027   "    Done 4/4/2017 Imm Admin: Tdap Vaccine          Patient Care Team:  Elizabeth Joyce M.D. as PCP - General (Internal Medicine)  Isacc Anna O.D. (Optometry)  Sam Whatley M.D. (Ophthalmology)    Social History   Substance Use Topics   • Smoking status: Never Smoker    • Smokeless tobacco: Never Used   • Alcohol Use: No     Family History   Problem Relation Age of Onset   • Adopted: Yes     She  has a past medical history of GERD (gastroesophageal reflux disease); Restless leg syndrome; Urinary tract infection, site not specified; Hypertension; Hyperlipidemia; Heart murmur; Aortic stenosis; Arthritis; Dental disorder; Breath shortness; Stroke (CMS-[facility]); and Pain (10/7/16). She also has no past medical history of Allergy or Diabetes.   Past Surgical History   Procedure Laterality Date   • Tubal coagulation laparoscopic bilateral  1970   • Cataract phaco with iol Right 10/11/2016     Procedure: CATARACT PHACO WITH IOL;  Surgeon: Sam Whatley M.D.;  Location: [facility];  Service:    • Cataract phaco with iol Left 11/8/2016     Procedure: CATARACT PHACO WITH IOL;  Surgeon: Sam Whatley M.D.;  Location: [facility];  Service:            Exam:     Blood pressure 130/80, pulse 66, temperature 36.6 °C (97.8 °F), resp. rate 16, height 1.45 m (4' 9.09\"), weight 53.6 kg (118 lb 2.7 oz), SpO2 96 %. Body mass index is 25.49 kg/(m^2).    Hearing;good  Dentition ;poor  Alert, oriented in no acute distress.  Eye contact is good, speech goal directed, affect calm      Assessment and Plan. The following treatment and monitoring plan is recommended:  1. Severe Aortic stenosis; status post cardiology consultation, patient has preferred no surgical intervention, denies having dyspnea or chest pain with daily walking.       2. Chronic pain;  multiple joints including back, responds to gabapentin 600 mg tablets , tramadol 50 mg and Zanaflex 2 mg as needed. Requesting refill of " medications.      3. Migraine headache; response to Imitrex or tramadol as needed.      4. GERD; responds to omeprazole 20 mg daily.      5. HTN/ borderline hypokalemia; currently on Tenoretic 100-25 milligrams daily.       6. Dyslipidemia;on  Lopid 600 mg by mouth twice a day.         8. Increased BMI; patient is down 16 pounds with diet and lifestyle modification.     Patient is advised in preventive and supportive care, diet and lifestyle modification, daily walking ,exercise ,  increase fluid intake, alternative therapies, refill medication.                               Services suggested:  Health Care Screening recommendations as per orders if indicated.  Referrals offered: PT/OT/Nutrition counseling/Behavioral Health/Smoking cessation as per orders if indicated.    Discussion today about general wellness and lifestyle habits:    · Prevent falls and reduce trip hazards; Cautioned about securing or removing rugs.  · Have a working fire alarm and carbon monoxide detector;   · Engage in regular physical activity and social activities       Follow-up: No Follow-up on file.

## 2017-07-07 NOTE — MR AVS SNAPSHOT
"Richardjenny Valles Bennett   2017 9:40 AM   Office Visit   MRN: [id number]    Department:  [facility]   Dept Phone:  [phone]    Description:  Female : 1940   Provider:  Elizabeth Joyce M.D.; [facility]            Reason for Visit     Annual Wellness Visit           Allergies as of 2017     No Known Allergies      You were diagnosed with     Bilateral low back pain without sciatica, unspecified chronicity   [[id number]]         Vital Signs     Blood Pressure Pulse Temperature Respirations Height Weight    130/80 mmHg 66 36.6 °C (97.8 °F) 16 1.45 m (4' 9.09\") 53.6 kg (118 lb 2.7 oz)    Body Mass Index Oxygen Saturation Smoking Status             25.49 kg/m2 96% Never Smoker          Basic Information     Date Of Birth Sex Race Ethnicity Preferred Language    1940 Female White Non- English      Your appointments     Aug 04, 2017  9:20 AM   Established Patient with Elizabeth Joyce M.D.   [facility] (--)    [address]  [address]  [place] [id number] [phone]           You will be receiving a confirmation call a few days before your appointment from our automated call confirmation system.            2018  8:00 AM   Established Patient with Elizabeth Joyce M.D.   [facility] (--)    [address]  [address]  [place] [id number] [phone]           You will be receiving a confirmation call a few days before your appointment from our automated call confirmation system.              Problem List              ICD-10-CM Priority Class Noted - Resolved    Cardiomegaly I51.7   2013 - Present    Essential hypertension, benign I10   2013 - Present    Restless legs syndrome G25.81   2013 - Present    Dyslipidemia E78.5   3/4/2016 - Present    Aortic stenosis, severe I35.0   3/4/2016 - Present    Headache, migraine G43.909   3/4/2016 - Present    Health care maintenance Z00.00   3/4/2016 - Present    Combined forms of " age-related cataract of right eye H25.811   10/11/2016 - Present    Combined forms of age-related cataract of left eye H25.812   11/8/2016 - Present      Health Maintenance        Date Due Completion Dates    PAP SMEAR 6/17/1960 ---    MAMMOGRAM 12/2/2017 (Originally 6/17/1979) ---    BONE DENSITY 12/2/2017 (Originally 6/17/2004) ---    COLONOSCOPY 12/2/2017 (Originally 6/17/1989) ---    IMM INFLUENZA (1) 9/1/2017 9/5/2016, 9/8/2015, 11/4/2013, 9/4/2012, 10/7/2011    IMM PNEUMOCOCCAL 65+ (ADULT) LOW/MEDIUM RISK SERIES (2 of 2 - PPSV23) 10/3/2017 10/3/2016    IMM DTaP/Tdap/Td Vaccine (2 - Td) 4/4/2027 4/4/2017            Current Immunizations     13-VALENT PCV PREVNAR 10/3/2016  7:39 AM    Influenza Vaccine Adult HD 9/8/2015 10:28 AM    Influenza Vaccine Quad Inj (Pf) 10/7/2011    Influenza Vaccine Quad Inj (Preserved) 9/5/2016, 11/4/2013, 9/4/2012    SHINGLES VACCINE 6/2/2015    Tdap Vaccine 4/4/2017      Below and/or attached are the medications your provider expects you to take. Review all of your home medications and newly ordered medications with your provider and/or pharmacist. Follow medication instructions as directed by your provider and/or pharmacist. Please keep your medication list with you and share with your provider. Update the information when medications are discontinued, doses are changed, or new medications (including over-the-counter products) are added; and carry medication information at all times in the event of emergency situations     Allergies:  No Known Allergies          Medications  Valid as of: July 07, 2017 - 10:37 AM    Generic Name Brand Name Tablet Size Instructions for use    Albuterol Sulfate (Aero Soln) albuterol 108 (90 BASE) MCG/ACT Inhale 2 Puffs by mouth every 6 hours as needed for Shortness of Breath.        Aspirin (Tablet Delayed Response) ECOTRIN 81 MG Take 81 mg by mouth every day.          Atenolol-Chlorthalidone (Tab) TENORETIC 100-25 MG Take 1 Tab by mouth every day.           Chlorphen-Pseudoeph-Methscop   Take  by mouth.        Cholecalciferol   Take 1 Cap by mouth every day.          Cyanocobalamin   Take  by mouth.        Esomeprazole Magnesium (CAPSULE DELAYED RELEASE) NEXIUM 20 MG Take 20 mg by mouth every morning before breakfast.        Gabapentin (Tab) NEURONTIN 600 MG Take 1 Tab by mouth 3 times a day.        Gemfibrozil (Tab) LOPID 600 MG Take 1 Tab by mouth 2 times a day.        Multiple Vitamin (Tab) MULTI-VITAMIN  Take 1 Tab by mouth every day.          Omega-3 Fatty Acids   Take 1 Cap by mouth every day.          Omeprazole Magnesium (Tablet Delayed Response) PRILOSEC OTC 20 MG Take 1 Tab by mouth every day.        Potassium   Take 1 Tab by mouth every day.          SUMAtriptan Succinate (Tab) IMITREX 25 MG Take 1-4 Tabs by mouth Once PRN for Migraine for up to 1 dose.        TiZANidine HCl (Tab) ZANAFLEX 2 MG Take 2 Tabs by mouth 2 times a day as needed.        TraMADol HCl (Tab) ULTRAM 50 MG Take 1 Tab by mouth 2 times a day as needed.        Vitamin A   Take 1 Tab by mouth every day.          .                 Medicines prescribed today were sent to:     [facility] - [place], [place] - [address] [address]    Phone: [phone] Fax: [phone]    Open 24 Hours?: No      Medication refill instructions:       If your prescription bottle indicates you have medication refills left, it is not necessary to call your provider’s office. Please contact your pharmacy and they will refill your medication.    If your prescription bottle indicates you do not have any refills left, you may request refills at any time through one of the following ways: The online [email or web address] system (except Urgent Care), by calling your provider’s office, or by asking your pharmacy to contact your provider’s office with a refill request. Medication refills are processed only during regular business hours and may not be available until the next business day.  Your provider may request additional information or to have a follow-up visit with you prior to refilling your medication.   *Please Note: Medication refills are assigned a new Rx number when refilled electronically. Your pharmacy may indicate that no refills were authorized even though a new prescription for the same medication is available at the pharmacy. Please request the medicine by name with the pharmacy before contacting your provider for a refill.           Adeptence Access Code: [email or web address]  Expires: 8/6/2017 10:37 AM    Adeptence  A secure, online tool to manage your health information     [employer]’s Adeptence® is a secure, online tool that connects you to your personalized health information from the privacy of your home -- day or night - making it very easy for you to manage your healthcare. Once the activation process is completed, you can even access your medical information using the Adeptence rhona, which is available for free in the Apple Rhona store or Google Play store.     Adeptence provides the following levels of access (as shown below):   My Chart Features   [facility] Doctor [facility]  Specialists [facility]  Urgent  Care Non-[facility]  Primary Care  Doctor   Email your healthcare team securely and privately 24/7 X X X    Manage appointments: schedule your next appointment; view details of past/upcoming appointments X      Request prescription refills. X      View recent personal medical records, including lab and immunizations X X X X   View health record, including health history, allergies, medications X X X X   Read reports about your outpatient visits, procedures, consult and ER notes X X X X   See your discharge summary, which is a recap of your hospital and/or ER visit that includes your diagnosis, lab results, and care plan. X X       How to register for Adeptence:  1. Go to  [email or web address].  2. Click on the Sign Up Now box, which takes you to the New Member Sign Up page. You  will need to provide the following information:  a. Enter your Procyrion Access Code exactly as it appears at the top of this page. (You will not need to use this code after you’ve completed the sign-up process. If you do not sign up before the expiration date, you must request a new code.)   b. Enter your date of birth.   c. Enter your home email address.   d. Click Submit, and follow the next screen’s instructions.  3. Create a Drybart ID. This will be your Procyrion login ID and cannot be changed, so think of one that is secure and easy to remember.  4. Create a Procyrion password. You can change your password at any time.  5. Enter your Password Reset Question and Answer. This can be used at a later time if you forget your password.   6. Enter your e-mail address. This allows you to receive e-mail notifications when new information is available in Procyrion.  7. Click Sign Up. You can now view your health information.    For assistance activating your Procyrion account, call [phone]

## 2017-07-10 RX ORDER — SUMATRIPTAN 25 MG/1
TABLET, FILM COATED ORAL
Qty: 9 TAB | Refills: 0 | Status: SHIPPED | OUTPATIENT
Start: 2017-07-10 | End: 2017-08-08 | Stop reason: SDUPTHER

## 2017-08-03 ENCOUNTER — TELEPHONE (OUTPATIENT)
Dept: MEDICAL GROUP | Facility: PHYSICIAN GROUP | Age: 77
End: 2017-08-03

## 2017-08-03 NOTE — TELEPHONE ENCOUNTER
ESTABLISHED PATIENT PRE-VISIT PLANNING     Note: Patient will not be contacted if there is no indication to call.     1.  Reviewed notes from the last few office visits within the medical group: Yes    2.  If any orders were placed at last visit or intended to be done for this visit (i.e. 6 mos follow-up), do we have Results/Consult Notes?        •  Labs - Labs were not ordered at last office visit.       •  Imaging - Imaging was not ordered at last office visit.       •  Referrals - No referrals were ordered at last office visit.    3. Is this appointment scheduled as a Hospital Follow-Up? No    4.  Immunizations were updated in [employer] using WebIZ?: Yes       •  Web Iz Recommendations: FLU and MMR     5.  Patient is due for the following Health Maintenance Topics:   Health Maintenance Due   Topic Date Due   • Annual Wellness Visit  1940   • PAP SMEAR  06/17/1960       - Patient has completed FLU, PNEUMOVAX (PPSV23), PREVNAR (PCV13) , TD, TDAP and ZOSTAVAX (Shingles) Immunization(s) per WebIZ. Chart has been updated.    6.  Patient was NOT informed to arrive 15 min prior to their scheduled appointment and bring in their medication bottles.

## 2017-08-04 ENCOUNTER — OFFICE VISIT (OUTPATIENT)
Dept: MEDICAL GROUP | Facility: PHYSICIAN GROUP | Age: 77
End: 2017-08-04
Payer: MEDICARE

## 2017-08-04 VITALS
OXYGEN SATURATION: 98 % | SYSTOLIC BLOOD PRESSURE: 130 MMHG | HEART RATE: 68 BPM | WEIGHT: 120 LBS | RESPIRATION RATE: 16 BRPM | DIASTOLIC BLOOD PRESSURE: 78 MMHG | BODY MASS INDEX: 25.89 KG/M2 | TEMPERATURE: 98.9 F | HEIGHT: 57 IN

## 2017-08-04 DIAGNOSIS — I35.0 AORTIC STENOSIS, SEVERE: ICD-10-CM

## 2017-08-04 DIAGNOSIS — G89.4 CHRONIC PAIN SYNDROME: ICD-10-CM

## 2017-08-04 DIAGNOSIS — I10 ESSENTIAL HYPERTENSION, BENIGN: ICD-10-CM

## 2017-08-04 DIAGNOSIS — K21.9 GASTROESOPHAGEAL REFLUX DISEASE WITHOUT ESOPHAGITIS: ICD-10-CM

## 2017-08-04 DIAGNOSIS — E78.5 DYSLIPIDEMIA: ICD-10-CM

## 2017-08-04 PROCEDURE — 99214 OFFICE O/P EST MOD 30 MIN: CPT | Performed by: INTERNAL MEDICINE

## 2017-08-04 RX ORDER — GABAPENTIN 600 MG/1
600 TABLET ORAL 3 TIMES DAILY
Qty: 90 TAB | Refills: 6 | Status: SHIPPED | OUTPATIENT
Start: 2017-08-04 | End: 2018-01-12 | Stop reason: SDUPTHER

## 2017-08-04 RX ORDER — TRAMADOL HYDROCHLORIDE 50 MG/1
50 TABLET ORAL 2 TIMES DAILY PRN
Qty: 60 TAB | Refills: 3 | Status: SHIPPED | OUTPATIENT
Start: 2017-08-04 | End: 2018-01-12 | Stop reason: SDUPTHER

## 2017-08-04 NOTE — PROGRESS NOTES
Subjective:  Tori is a 77 y.o. female with the following   Past Medical History   Diagnosis Date   • GERD (gastroesophageal reflux disease)    • Restless leg syndrome      Dr. adler    • Urinary tract infection, site not specified    • Hypertension    • Hyperlipidemia    • Heart murmur    • Aortic stenosis    • Arthritis    • Dental disorder    • Breath shortness    • Stroke (CMS-HCC)      had in 1970s   • Pain 10/7/16     lower back, hips and legs      Family History   Problem Relation Age of Onset   • Adopted: Yes     The patient is on the following medications:   Current outpatient prescriptions:   •  SUMAtriptan (IMITREX) 25 MG Tab tablet, TAKE 1 TO 4 TABLETS BY MOUTH ONCE AS NEEDED FOR MIGRAINE FOR UP TO 1 DOSE, Disp: 9 Tab, Rfl: 0  •  tramadol (ULTRAM) 50 MG Tab, Take 1 Tab by mouth 2 times a day as needed., Disp: 60 Tab, Rfl: 3  •  tizanidine (ZANAFLEX) 2 MG tablet, Take 2 Tabs by mouth 2 times a day as needed., Disp: 60 Tab, Rfl: 3  •  gabapentin (NEURONTIN) 600 MG tablet, Take 1 Tab by mouth 3 times a day., Disp: 90 Tab, Rfl: 6  •  atenolol-chlorthalidone (TENORETIC) 100-25 MG per tablet, Take 1 Tab by mouth every day., Disp: 90 Tab, Rfl: 6  •  gemfibrozil (LOPID) 600 MG Tab, Take 1 Tab by mouth 2 times a day., Disp: 180 Tab, Rfl: 3  •  omeprazole (PRILOSEC OTC) 20 MG tablet, Take 1 Tab by mouth every day., Disp: 90 Tab, Rfl: 3  •  esomeprazole (NEXIUM) 20 MG capsule, Take 20 mg by mouth every morning before breakfast., Disp: , Rfl:   •  Chlorphen-Pseudoeph-Methscop (ALLERGY AM/PM PO), Take  by mouth., Disp: , Rfl:   •  Cyanocobalamin (VITAMIN B 12 PO), Take  by mouth., Disp: , Rfl:   •  aspirin EC (ECOTRIN) 81 MG TBEC, Take 81 mg by mouth every day.  , Disp: , Rfl:   •  Cholecalciferol (VITAMIN D-3 PO), Take 1 Cap by mouth every day.  , Disp: , Rfl:   •  Omega-3 Fatty Acids (FISH OIL PO), Take 1 Cap by mouth every day.  , Disp: , Rfl:   •  Multiple Vitamin (MULTI-VITAMIN) TABS, Take 1 Tab by mouth every  "day.  , Disp: , Rfl:   •  POTASSIUM PO, Take 1 Tab by mouth every day.  , Disp: , Rfl:   •  VITAMIN A PO, Take 1 Tab by mouth every day.  , Disp: , Rfl:     HPI; patient is here today for follow-up visit, currently on Tenoretic for hypertension , omeprazole for GERD, Lopid for dyslipidemia and tramadol, gabapentin and Zanaflex for chronic pain, denies having chest pain , shortness of breath at rest or with walking, fatigue, dysphagia or abdominal pain. Patient is requesting refill of tramadol.  ROS:  See HPI    Blood pressure 130/78, pulse 68, temperature 37.2 °C (98.9 °F), resp. rate 16, height 1.448 m (4' 9.01\"), weight 54.432 kg (120 lb), SpO2 98 %.on RA  Objective:  Patient is well appearing and in no acute distress.  Pharynx is clear.  Neck is soft and supple with no cervical or supraclavicular lymphadenopathy, thyromegaly or masses, no JVD.  Lungs clear to auscultation bilaterally with normal respiratory effort. Abdomen soft, non-tender on palpation,not distended. Heart regular rate and rhythm without murmur. Extremities without any clubbing, cyanosis, or edema.    Assessment and Plan:  1. Severe Aortic stenosis; status post cardiology consultation, patient has preferred no surgical intervention, denies having dyspnea or chest pain with daily walking.       2. Chronic pain;  multiple joints including back, responds to gabapentin 600 mg tablets , tramadol 50 mg and Zanaflex 2 mg as needed.       3. Migraine headache; responds to Imitrex or tramadol as needed.      4. GERD; responds to omeprazole 20 mg daily.      5. HTN; BP is stable on Tenoretic 100-25 milligrams daily.       6. Dyslipidemia;on  Lopid 600 mg by mouth twice a day.      Ref. Range 1/27/2017 08:02   Cholesterol,Tot Latest Ref Range: 100-199 mg/dL 115   Triglycerides Latest Ref Range: 0-149 mg/dL 109   HDL Latest Ref Range: >=40 mg/dL 40   LDL Latest Ref Range: <100 mg/dL 53       8. Increased BMI;        9. Preventive health;   Ref. Range " 6/16/2017 12:00   Occult Blood, IA Latest Ref Range: Negative  Negative       Patient is advised on preventive and supportive care, diet and lifestyle modification, daily walking as tolerated, refilled medication, monitor labs.    Please note that this dictation was created using voice recognition software. I have worked with consultants from the vendor as well as technical experts from [facility] to optimize the interface. I have made every reasonable attempt to correct obvious errors, but I expect that there are errors of grammar and possibly content that I did not discover before finalizing the note.

## 2017-08-04 NOTE — MR AVS SNAPSHOT
"        Tori Mcgowande   2017 9:20 AM   Office Visit   MRN: [id number]    Department:  [facility]   Dept Phone:  [phone]    Description:  Female : 1940   Provider:  Elizabeth Joyce M.D.           Reason for Visit     Other \" I am here for my 6 month follow up.  I had some tests done.\"    Medication Refill \" I need my tramadol refilled.  I think I need all my medications refilled\"      Allergies as of 2017     No Known Allergies      You were diagnosed with     Aortic stenosis, severe   [318446]       Essential hypertension, benign   [401.1.ICD-9-CM]       Dyslipidemia   [309871]         Vital Signs     Blood Pressure Pulse Temperature Respirations Height Weight    130/78 mmHg 68 37.2 °C (98.9 °F) 16 1.448 m (4' 9.01\") 54.432 kg (120 lb)    Body Mass Index Oxygen Saturation Smoking Status             25.96 kg/m2 98% Never Smoker          Basic Information     Date Of Birth Sex Race Ethnicity Preferred Language    1940 Female White Non- English      Your appointments     2018  8:00 AM   Established Patient with Elizabeth Joyce M.D.   [facility] (--)    [address]  [address]  [place] [id number] [phone]           You will be receiving a confirmation call a few days before your appointment from our automated call confirmation system.              Problem List              ICD-10-CM Priority Class Noted - Resolved    Cardiomegaly I51.7   2013 - Present    Essential hypertension, benign I10   2013 - Present    Restless legs syndrome G25.81   2013 - Present    Dyslipidemia E78.5   3/4/2016 - Present    Aortic stenosis, severe I35.0   3/4/2016 - Present    Headache, migraine G43.909   3/4/2016 - Present    Health care maintenance Z00.00   3/4/2016 - Present    Combined forms of age-related cataract of right eye H25.811   10/11/2016 - Present    Combined forms of age-related cataract of left eye H25.812   2016 - Present      Health " Maintenance        Date Due Completion Dates    PAP SMEAR 6/17/1960 ---    MAMMOGRAM 12/2/2017 (Originally 6/17/1979) ---    BONE DENSITY 12/2/2017 (Originally 6/17/2004) ---    COLONOSCOPY 12/2/2017 (Originally 6/17/1989) ---    IMM INFLUENZA (1) 9/1/2017 9/5/2016, 9/8/2015, 9/17/2014, 11/4/2013, 9/4/2012, 10/7/2011    IMM DTaP/Tdap/Td Vaccine (2 - Td) 4/4/2027 4/4/2017, 1/7/2011            Current Immunizations     13-VALENT PCV PREVNAR 10/3/2016  7:39 AM    Influenza Vaccine Adult HD 9/8/2015 10:28 AM    Influenza Vaccine Quad Inj (Pf) 10/7/2011    Influenza Vaccine Quad Inj (Preserved) 9/5/2016, 9/17/2014, 11/4/2013, 9/4/2012    Pneumococcal polysaccharide vaccine (PPSV-23) 9/4/2012    SHINGLES VACCINE 6/2/2015    TD Vaccine 1/7/2011    Tdap Vaccine 4/4/2017      Below and/or attached are the medications your provider expects you to take. Review all of your home medications and newly ordered medications with your provider and/or pharmacist. Follow medication instructions as directed by your provider and/or pharmacist. Please keep your medication list with you and share with your provider. Update the information when medications are discontinued, doses are changed, or new medications (including over-the-counter products) are added; and carry medication information at all times in the event of emergency situations     Allergies:  No Known Allergies          Medications  Valid as of: August 04, 2017 -  9:27 AM    Generic Name Brand Name Tablet Size Instructions for use    Aspirin (Tablet Delayed Response) ECOTRIN 81 MG Take 81 mg by mouth every day.          Atenolol-Chlorthalidone (Tab) TENORETIC 100-25 MG Take 1 Tab by mouth every day.        Chlorphen-Pseudoeph-Methscop   Take  by mouth.        Cholecalciferol   Take 1 Cap by mouth every day.          Cyanocobalamin   Take  by mouth.        Esomeprazole Magnesium (CAPSULE DELAYED RELEASE) NEXIUM 20 MG Take 20 mg by mouth every morning before breakfast.         Gabapentin (Tab) NEURONTIN 600 MG Take 1 Tab by mouth 3 times a day.        Gemfibrozil (Tab) LOPID 600 MG Take 1 Tab by mouth 2 times a day.        Multiple Vitamin (Tab) MULTI-VITAMIN  Take 1 Tab by mouth every day.          Omega-3 Fatty Acids   Take 1 Cap by mouth every day.          Omeprazole Magnesium (Tablet Delayed Response) PRILOSEC OTC 20 MG Take 1 Tab by mouth every day.        Potassium   Take 1 Tab by mouth every day.          SUMAtriptan Succinate (Tab) IMITREX 25 MG TAKE 1 TO 4 TABLETS BY MOUTH ONCE AS NEEDED FOR MIGRAINE FOR UP TO 1 DOSE        TiZANidine HCl (Tab) ZANAFLEX 2 MG Take 2 Tabs by mouth 2 times a day as needed.        TraMADol HCl (Tab) ULTRAM 50 MG Take 1 Tab by mouth 2 times a day as needed.        Vitamin A   Take 1 Tab by mouth every day.          .                 Medicines prescribed today were sent to:     [facility] - [place], [place] - [address] [address]    Phone: [phone] Fax: [phone]    Open 24 Hours?: No      Medication refill instructions:       If your prescription bottle indicates you have medication refills left, it is not necessary to call your provider’s office. Please contact your pharmacy and they will refill your medication.    If your prescription bottle indicates you do not have any refills left, you may request refills at any time through one of the following ways: The online [employer] system (except Urgent Care), by calling your provider’s office, or by asking your pharmacy to contact your provider’s office with a refill request. Medication refills are processed only during regular business hours and may not be available until the next business day. Your provider may request additional information or to have a follow-up visit with you prior to refilling your medication.   *Please Note: Medication refills are assigned a new Rx number when refilled electronically. Your pharmacy may indicate that no refills were  authorized even though a new prescription for the same medication is available at the pharmacy. Please request the medicine by name with the pharmacy before contacting your provider for a refill.        Your To Do List     Future Labs/Procedures Complete By Expires    CREATININE  As directed 8/4/2018    POTASSIUM SERUM (K)  As directed 8/4/2018    VITAMIN D,25 HYDROXY  As directed 8/4/2018         Clear Image Technology Access Code: [email or web address]  Expires: 8/6/2017 10:37 AM    Clear Image Technology  A secure, online tool to manage your health information     [employer]’s Clear Image Technology® is a secure, online tool that connects you to your personalized health information from the privacy of your home -- day or night - making it very easy for you to manage your healthcare. Once the activation process is completed, you can even access your medical information using the Clear Image Technology rhona, which is available for free in the Apple Rhona store or Google Play store.     Clear Image Technology provides the following levels of access (as shown below):   My Chart Features   [facility] Primary Care Doctor [facility]  Specialists [facility]  Urgent  Care Non-[facility]  Primary Care  Doctor   Email your healthcare team securely and privately 24/7 X X X    Manage appointments: schedule your next appointment; view details of past/upcoming appointments X      Request prescription refills. X      View recent personal medical records, including lab and immunizations X X X X   View health record, including health history, allergies, medications X X X X   Read reports about your outpatient visits, procedures, consult and ER notes X X X X   See your discharge summary, which is a recap of your hospital and/or ER visit that includes your diagnosis, lab results, and care plan. X X       How to register for Clear Image Technology:  1. Go to  [email or web address].  2. Click on the Sign Up Now box, which takes you to the New Member Sign Up page. You will need to provide the following information:  a. Enter your Clear Image Technology  Access Code exactly as it appears at the top of this page. (You will not need to use this code after you’ve completed the sign-up process. If you do not sign up before the expiration date, you must request a new code.)   b. Enter your date of birth.   c. Enter your home email address.   d. Click Submit, and follow the next screen’s instructions.  3. Create a Errplane ID. This will be your Errplane login ID and cannot be changed, so think of one that is secure and easy to remember.  4. Create a Triada Gamest password. You can change your password at any time.  5. Enter your Password Reset Question and Answer. This can be used at a later time if you forget your password.   6. Enter your e-mail address. This allows you to receive e-mail notifications when new information is available in Errplane.  7. Click Sign Up. You can now view your health information.    For assistance activating your Errplane account, call [phone]

## 2017-08-09 RX ORDER — SUMATRIPTAN 25 MG/1
TABLET, FILM COATED ORAL
Qty: 9 TAB | Refills: 0 | Status: SHIPPED | OUTPATIENT
Start: 2017-08-09 | End: 2017-09-12 | Stop reason: SDUPTHER

## 2017-09-06 ENCOUNTER — OFFICE VISIT (OUTPATIENT)
Dept: URGENT CARE | Facility: CLINIC | Age: 77
End: 2017-09-06
Payer: MEDICARE

## 2017-09-06 ENCOUNTER — APPOINTMENT (OUTPATIENT)
Dept: MEDICAL GROUP | Facility: PHYSICIAN GROUP | Age: 77
End: 2017-09-06
Payer: MEDICARE

## 2017-09-06 VITALS
DIASTOLIC BLOOD PRESSURE: 88 MMHG | HEART RATE: 74 BPM | OXYGEN SATURATION: 98 % | SYSTOLIC BLOOD PRESSURE: 130 MMHG | HEIGHT: 58 IN | RESPIRATION RATE: 18 BRPM | TEMPERATURE: 98.5 F | WEIGHT: 115.4 LBS | BODY MASS INDEX: 24.22 KG/M2

## 2017-09-06 DIAGNOSIS — R42 VERTIGO: ICD-10-CM

## 2017-09-06 DIAGNOSIS — R09.81 NASAL CONGESTION: ICD-10-CM

## 2017-09-06 DIAGNOSIS — H61.23 BILATERAL IMPACTED CERUMEN: ICD-10-CM

## 2017-09-06 PROCEDURE — 99213 OFFICE O/P EST LOW 20 MIN: CPT | Mod: 25 | Performed by: NURSE PRACTITIONER

## 2017-09-06 PROCEDURE — 69210 REMOVE IMPACTED EAR WAX UNI: CPT | Performed by: NURSE PRACTITIONER

## 2017-09-06 RX ORDER — FLUTICASONE PROPIONATE 50 MCG
1 SPRAY, SUSPENSION (ML) NASAL DAILY
Qty: 16 G | Refills: 0 | Status: SHIPPED | OUTPATIENT
Start: 2017-09-06 | End: 2018-01-01

## 2017-09-06 NOTE — PROGRESS NOTES
"Chief Complaint   Patient presents with   • Vertigo     x1week, left ear unable to hear, dizziness, coughing up green       HISTORY OF PRESENT ILLNESS: Patient is a 77 y.o. female who presents to urgent care today due to complaints of dizziness, left ear pressure, mild cough, and nasal congestion. States that three weeks ago she had an episode of dizziness, immediately after she awoke, lasting a few hours. Since then she has had some mild dizziness, states it feels like her body is \"rocking\". She denies neck pain, numbness, tingling, weakness, changes in speech, or droop. States she has a mild headache but does regularly and denies new sensations. She also denies fever, chills, malaise, or sinus pressure. She was seen by her PCP a few days ago and was told she had cerumen impaction and tried hydrogen peroxide at home without improvement.       Patient Active Problem List    Diagnosis Date Noted   • Combined forms of age-related cataract of left eye 11/08/2016   • Combined forms of age-related cataract of right eye 10/11/2016   • Dyslipidemia 03/04/2016   • Aortic stenosis, severe 03/04/2016   • Headache, migraine 03/04/2016   • Health care maintenance 03/04/2016   • Cardiomegaly 01/17/2013   • Essential hypertension, benign 01/17/2013   • Restless legs syndrome 01/17/2013       Allergies:Review of patient's allergies indicates no known allergies.    Current Outpatient Prescriptions Ordered in [facility]   Medication Sig Dispense Refill   • tizanidine (ZANAFLEX) 2 MG tablet Take 2 Tabs by mouth 2 times a day as needed. 60 Tab 3   • atenolol-chlorthalidone (TENORETIC) 100-25 MG per tablet Take 1 Tab by mouth every day. 90 Tab 6   • gemfibrozil (LOPID) 600 MG Tab Take 1 Tab by mouth 2 times a day. 180 Tab 3   • omeprazole (PRILOSEC OTC) 20 MG tablet Take 1 Tab by mouth every day. 90 Tab 3   • esomeprazole (NEXIUM) 20 MG capsule Take 20 mg by mouth every morning before breakfast.     • Chlorphen-Pseudoeph-Methscop (ALLERGY " AM/PM PO) Take  by mouth.     • Cyanocobalamin (VITAMIN B 12 PO) Take  by mouth.     • aspirin EC (ECOTRIN) 81 MG TBEC Take 81 mg by mouth every day.       • Cholecalciferol (VITAMIN D-3 PO) Take 1 Cap by mouth every day.       • Omega-3 Fatty Acids (FISH OIL PO) Take 1 Cap by mouth every day.       • Multiple Vitamin (MULTI-VITAMIN) TABS Take 1 Tab by mouth every day.       • POTASSIUM PO Take 1 Tab by mouth every day.       • VITAMIN A PO Take 1 Tab by mouth every day.       • SUMAtriptan (IMITREX) 25 MG Tab tablet TAKE 1 TO 4 TABLETS BY MOUTH ONCE AS NEEDED FOR MIGRAINE FOR UP TO 1 DOSE 9 Tab 0   • gabapentin (NEURONTIN) 600 MG tablet Take 1 Tab by mouth 3 times a day. 90 Tab 6   • tramadol (ULTRAM) 50 MG Tab Take 1 Tab by mouth 2 times a day as needed. 60 Tab 3     No current Epic-ordered facility-administered medications on file.        Past Medical History:   Diagnosis Date   • Pain 10/7/16    lower back, hips and legs   • Aortic stenosis    • Arthritis    • Breath shortness    • Dental disorder    • GERD (gastroesophageal reflux disease)    • Heart murmur    • Hyperlipidemia    • Hypertension    • Restless leg syndrome     Dr. adler    • Stroke ([facility])     had in 1970s   • Urinary tract infection, site not specified        Social History   Substance Use Topics   • Smoking status: Never Smoker   • Smokeless tobacco: Never Used   • Alcohol use No       Family Status   Relation Status   • Mother Other   • Father Other     Family History   Problem Relation Age of Onset   • Adopted: Yes       ROS:  Review of Systems   Constitutional: Negative for fever, chills, weight loss, malaise, and fatigue.   HENT: Positive for left ear pressure, decreased hearing, and nasal congestion. Negative for nosebleeds, sore throat and neck pain.    Eyes: Negative for vision changes.   Neuro: Positive for dizziness sensation. Negative for sensory changes, weakness, seizure, LOC.   Cardiovascular: Negative for chest pain,  "palpitations, orthopnea and leg swelling.   Respiratory: Positive for cough, sputum production. Negative for shortness of breath and wheezing.   Gastrointestinal: Negative for abdominal pain, nausea, vomiting or diarrhea.   Genitourinary: Negative for dysuria, urgency and frequency.  Musculoskeletal: Negative for falls, neck pain, back pain, joint pain, myalgias.   Skin: Negative for rash, diaphoresis.     Exam:  Blood pressure 130/88, pulse 74, temperature 36.9 °C (98.5 °F), resp. rate 18, height 1.461 m (4' 9.5\"), weight 52.3 kg (115 lb 6.4 oz), SpO2 98 %.  General: well-nourished, well-developed female in NAD  Head: normocephalic, atraumatic  Eyes: PERRLA, no conjunctival injection, acuity grossly intact, lids normal.  Ears: normal shape and symmetry, no tenderness, no discharge. External canals are impacted bilaterally with cerumen. Gross auditory acuity is intact.  Nose: symmetrical without tenderness, clear discharge.  Mouth/Throat: reasonable hygiene, no erythema, exudates or tonsillar enlargement.  Neck: no masses, range of motion within normal limits, no tracheal deviation. No obvious thyroid enlargement.   Lymph: no cervical adenopathy. No supraclavicular adenopathy.   Neuro: alert and oriented. Cranial nerves 1-12 grossly intact. No sensory deficit. No droop,  are equal and strong.   Cardiovascular: regular rate and rhythm. No edema.  Pulmonary: no distress. Chest is symmetrical with respiration, no wheezes, crackles, or rhonchi.   Musculoskeletal: no clubbing, appropriate muscle tone, gait is stable.  Skin: warm, dry, intact, no clubbing, no cyanosis, no rashes.   Psych: appropriate mood, affect, judgement.         Assessment/Plan:  1. Bilateral impacted cerumen  REFERRAL TO ENT   2. Vertigo  fluticasone (FLONASE) 50 MCG/ACT nasal spray    REFERRAL TO ENT   3. Nasal congestion  fluticasone (FLONASE) 50 MCG/ACT nasal spray       Procedure: Cerumen Removal  Risks and benefits of procedure " discussed  Minimal cerumen was removed with curette and lavage after softening agent instilled from both canals, still unable to visualize TM post procedure, unable to remove entire cerumen  Patient tolerated well      Vertigo sensations most likely related to cerumen impaction and/or congestion. She does not have any s/s infectious process requiring abx today. She does have a history of CVA but her neurological exam is negative today. She has been given STRICT ER precautions regarding any s/s stroke. Home use of debrox and return to  for additional attempts of cerumen removal. Flonase as directed. Referral placed to ENT.   Supportive care, differential diagnoses, and indications for immediate follow-up discussed with patient.   Pathogenesis of diagnosis discussed including typical length and natural progression.   Instructed to return to clinic or nearest emergency department for any change in condition, further concerns, or worsening of symptoms.  Patient states understanding of the plan of care and discharge instructions.  Instructed to make an appointment, for follow up, with their primary care provider.        Please note that this dictation was created using voice recognition software. I have made every reasonable attempt to correct obvious errors, but I expect that there are errors of grammar and possibly content that I did not discover before finalizing the note.      RATNA Key.

## 2017-09-13 RX ORDER — SUMATRIPTAN 25 MG/1
TABLET, FILM COATED ORAL
Qty: 9 TAB | Refills: 0 | Status: SHIPPED | OUTPATIENT
Start: 2017-09-13 | End: 2017-10-16 | Stop reason: SDUPTHER

## 2017-09-27 RX ORDER — TIZANIDINE 2 MG/1
TABLET ORAL
Qty: 60 TAB | Refills: 0 | Status: SHIPPED | OUTPATIENT
Start: 2017-09-27 | End: 2017-10-31 | Stop reason: SDUPTHER

## 2017-10-16 RX ORDER — SUMATRIPTAN 25 MG/1
TABLET, FILM COATED ORAL
Qty: 9 TAB | Refills: 0 | Status: SHIPPED | OUTPATIENT
Start: 2017-10-16 | End: 2018-01-01 | Stop reason: SDUPTHER

## 2017-11-01 RX ORDER — TIZANIDINE 2 MG/1
TABLET ORAL
Qty: 60 TAB | Refills: 0 | Status: SHIPPED | OUTPATIENT
Start: 2017-11-01 | End: 2018-02-07 | Stop reason: SDUPTHER

## 2017-11-01 RX ORDER — ATENOLOL AND CHLORTHALIDONE TABLET 100; 25 MG/1; MG/1
TABLET ORAL
Qty: 90 TAB | Refills: 0 | Status: SHIPPED | OUTPATIENT
Start: 2017-11-01 | End: 2018-01-12 | Stop reason: SDUPTHER

## 2017-12-11 ENCOUNTER — HOSPITAL ENCOUNTER (OUTPATIENT)
Dept: LAB | Facility: MEDICAL CENTER | Age: 77
End: 2017-12-11
Attending: INTERNAL MEDICINE
Payer: MEDICARE

## 2017-12-11 DIAGNOSIS — E78.5 DYSLIPIDEMIA: ICD-10-CM

## 2017-12-11 DIAGNOSIS — I35.0 AORTIC STENOSIS, SEVERE: ICD-10-CM

## 2017-12-11 DIAGNOSIS — I10 ESSENTIAL HYPERTENSION, BENIGN: ICD-10-CM

## 2017-12-11 LAB
25(OH)D3 SERPL-MCNC: 40 NG/ML (ref 30–100)
CREAT SERPL-MCNC: 0.72 MG/DL (ref 0.5–1.4)
GFR SERPL CREATININE-BSD FRML MDRD: >60 ML/MIN/1.73 M 2
POTASSIUM SERPL-SCNC: 3.5 MMOL/L (ref 3.6–5.5)

## 2017-12-11 PROCEDURE — 82565 ASSAY OF CREATININE: CPT

## 2017-12-11 PROCEDURE — 82306 VITAMIN D 25 HYDROXY: CPT

## 2017-12-11 PROCEDURE — 84132 ASSAY OF SERUM POTASSIUM: CPT

## 2017-12-11 PROCEDURE — 36415 COLL VENOUS BLD VENIPUNCTURE: CPT

## 2018-01-01 ENCOUNTER — APPOINTMENT (OUTPATIENT)
Dept: RADIOLOGY | Facility: MEDICAL CENTER | Age: 78
End: 2018-01-01
Attending: EMERGENCY MEDICINE
Payer: MEDICARE

## 2018-01-01 ENCOUNTER — TELEPHONE (OUTPATIENT)
Dept: MEDICAL GROUP | Facility: PHYSICIAN GROUP | Age: 78
End: 2018-01-01

## 2018-01-01 ENCOUNTER — OFFICE VISIT (OUTPATIENT)
Dept: MEDICAL GROUP | Facility: PHYSICIAN GROUP | Age: 78
End: 2018-01-01
Payer: MEDICARE

## 2018-01-01 ENCOUNTER — HOSPITAL ENCOUNTER (EMERGENCY)
Facility: MEDICAL CENTER | Age: 78
End: 2018-11-26
Attending: EMERGENCY MEDICINE
Payer: MEDICARE

## 2018-01-01 ENCOUNTER — TELEPHONE (OUTPATIENT)
Dept: MEDICAL GROUP | Facility: MEDICAL CENTER | Age: 78
End: 2018-01-01

## 2018-01-01 ENCOUNTER — HOSPITAL ENCOUNTER (OUTPATIENT)
Dept: LAB | Facility: MEDICAL CENTER | Age: 78
End: 2018-03-26
Attending: PHYSICIAN ASSISTANT
Payer: MEDICARE

## 2018-01-01 ENCOUNTER — PATIENT OUTREACH (OUTPATIENT)
Dept: HEALTH INFORMATION MANAGEMENT | Facility: OTHER | Age: 78
End: 2018-01-01

## 2018-01-01 ENCOUNTER — OFFICE VISIT (OUTPATIENT)
Dept: MEDICAL GROUP | Facility: MEDICAL CENTER | Age: 78
End: 2018-01-01
Payer: MEDICARE

## 2018-01-01 VITALS
RESPIRATION RATE: 17 BRPM | TEMPERATURE: 98.6 F | BODY MASS INDEX: 26.63 KG/M2 | DIASTOLIC BLOOD PRESSURE: 96 MMHG | SYSTOLIC BLOOD PRESSURE: 121 MMHG | HEIGHT: 57 IN | HEART RATE: 91 BPM | WEIGHT: 123.46 LBS | OXYGEN SATURATION: 95 %

## 2018-01-01 VITALS
HEIGHT: 58 IN | TEMPERATURE: 97.1 F | RESPIRATION RATE: 16 BRPM | OXYGEN SATURATION: 92 % | BODY MASS INDEX: 23.72 KG/M2 | HEART RATE: 69 BPM | DIASTOLIC BLOOD PRESSURE: 82 MMHG | WEIGHT: 113 LBS | SYSTOLIC BLOOD PRESSURE: 118 MMHG

## 2018-01-01 VITALS
TEMPERATURE: 97.4 F | RESPIRATION RATE: 16 BRPM | WEIGHT: 108 LBS | SYSTOLIC BLOOD PRESSURE: 108 MMHG | DIASTOLIC BLOOD PRESSURE: 78 MMHG | BODY MASS INDEX: 23.3 KG/M2 | HEIGHT: 57 IN | HEART RATE: 67 BPM | OXYGEN SATURATION: 98 %

## 2018-01-01 VITALS
BODY MASS INDEX: 23.31 KG/M2 | DIASTOLIC BLOOD PRESSURE: 52 MMHG | WEIGHT: 108.03 LBS | OXYGEN SATURATION: 97 % | HEART RATE: 67 BPM | HEIGHT: 57 IN | SYSTOLIC BLOOD PRESSURE: 90 MMHG | TEMPERATURE: 98.3 F

## 2018-01-01 VITALS
HEIGHT: 58 IN | BODY MASS INDEX: 22.91 KG/M2 | OXYGEN SATURATION: 97 % | RESPIRATION RATE: 16 BRPM | TEMPERATURE: 98 F | WEIGHT: 109.13 LBS | SYSTOLIC BLOOD PRESSURE: 120 MMHG | DIASTOLIC BLOOD PRESSURE: 70 MMHG | HEART RATE: 77 BPM

## 2018-01-01 VITALS
HEART RATE: 67 BPM | WEIGHT: 113 LBS | HEIGHT: 58 IN | OXYGEN SATURATION: 97 % | TEMPERATURE: 97.7 F | BODY MASS INDEX: 23.72 KG/M2

## 2018-01-01 DIAGNOSIS — I10 ESSENTIAL HYPERTENSION, BENIGN: ICD-10-CM

## 2018-01-01 DIAGNOSIS — G43.909 MIGRAINE WITHOUT STATUS MIGRAINOSUS, NOT INTRACTABLE, UNSPECIFIED MIGRAINE TYPE: ICD-10-CM

## 2018-01-01 DIAGNOSIS — Z13.1 SCREENING FOR DIABETES MELLITUS (DM): ICD-10-CM

## 2018-01-01 DIAGNOSIS — E87.6 LOW SERUM POTASSIUM: ICD-10-CM

## 2018-01-01 DIAGNOSIS — I35.0 AORTIC STENOSIS, SEVERE: ICD-10-CM

## 2018-01-01 DIAGNOSIS — E78.5 DYSLIPIDEMIA: ICD-10-CM

## 2018-01-01 DIAGNOSIS — Z13.6 SCREENING FOR CARDIOVASCULAR CONDITION: ICD-10-CM

## 2018-01-01 DIAGNOSIS — Z00.00 HEALTHCARE MAINTENANCE: ICD-10-CM

## 2018-01-01 DIAGNOSIS — K21.9 GASTROESOPHAGEAL REFLUX DISEASE WITHOUT ESOPHAGITIS: ICD-10-CM

## 2018-01-01 DIAGNOSIS — G25.81 RESTLESS LEGS SYNDROME: ICD-10-CM

## 2018-01-01 DIAGNOSIS — K08.9 DENTAL DISORDER: ICD-10-CM

## 2018-01-01 DIAGNOSIS — M19.90 ARTHRITIS: ICD-10-CM

## 2018-01-01 DIAGNOSIS — Z00.00 INITIAL MEDICARE ANNUAL WELLNESS VISIT: Primary | ICD-10-CM

## 2018-01-01 DIAGNOSIS — R05.8 POST-VIRAL COUGH SYNDROME: ICD-10-CM

## 2018-01-01 DIAGNOSIS — M62.838 NECK MUSCLE SPASM: ICD-10-CM

## 2018-01-01 DIAGNOSIS — R25.2 MUSCLE CRAMPS: ICD-10-CM

## 2018-01-01 DIAGNOSIS — I51.7 CARDIOMEGALY: ICD-10-CM

## 2018-01-01 DIAGNOSIS — R06.02 SOB (SHORTNESS OF BREATH): ICD-10-CM

## 2018-01-01 LAB
ALBUMIN SERPL BCP-MCNC: 4.4 G/DL (ref 3.2–4.9)
ALBUMIN SERPL BCP-MCNC: 4.4 G/DL (ref 3.2–4.9)
ALBUMIN/GLOB SERPL: 1.7 G/DL
ALBUMIN/GLOB SERPL: 1.8 G/DL
ALP SERPL-CCNC: 134 U/L (ref 30–99)
ALP SERPL-CCNC: 183 U/L (ref 30–99)
ALT SERPL-CCNC: 10 U/L (ref 2–50)
ALT SERPL-CCNC: 13 U/L (ref 2–50)
ANION GAP SERPL CALC-SCNC: 15 MMOL/L (ref 0–11.9)
ANION GAP SERPL CALC-SCNC: 9 MMOL/L (ref 0–11.9)
AST SERPL-CCNC: 22 U/L (ref 12–45)
AST SERPL-CCNC: 27 U/L (ref 12–45)
BASOPHILS # BLD AUTO: 0.8 % (ref 0–1.8)
BASOPHILS # BLD: 0.06 K/UL (ref 0–0.12)
BILIRUB SERPL-MCNC: 0.4 MG/DL (ref 0.1–1.5)
BILIRUB SERPL-MCNC: 0.5 MG/DL (ref 0.1–1.5)
BNP SERPL-MCNC: 3118 PG/ML (ref 0–100)
BUN SERPL-MCNC: 15 MG/DL (ref 8–22)
BUN SERPL-MCNC: 17 MG/DL (ref 8–22)
CALCIUM SERPL-MCNC: 9 MG/DL (ref 8.5–10.5)
CALCIUM SERPL-MCNC: 9.2 MG/DL (ref 8.5–10.5)
CHLORIDE SERPL-SCNC: 105 MMOL/L (ref 96–112)
CHLORIDE SERPL-SCNC: 107 MMOL/L (ref 96–112)
CHOLEST SERPL-MCNC: 91 MG/DL (ref 100–199)
CO2 SERPL-SCNC: 19 MMOL/L (ref 20–33)
CO2 SERPL-SCNC: 21 MMOL/L (ref 20–33)
CREAT SERPL-MCNC: 0.71 MG/DL (ref 0.5–1.4)
CREAT SERPL-MCNC: 0.82 MG/DL (ref 0.5–1.4)
EKG IMPRESSION: NORMAL
EOSINOPHIL # BLD AUTO: 0.02 K/UL (ref 0–0.51)
EOSINOPHIL NFR BLD: 0.3 % (ref 0–6.9)
ERYTHROCYTE [DISTWIDTH] IN BLOOD BY AUTOMATED COUNT: 53.3 FL (ref 35.9–50)
GLOBULIN SER CALC-MCNC: 2.5 G/DL (ref 1.9–3.5)
GLOBULIN SER CALC-MCNC: 2.6 G/DL (ref 1.9–3.5)
GLUCOSE SERPL-MCNC: 207 MG/DL (ref 65–99)
GLUCOSE SERPL-MCNC: 72 MG/DL (ref 65–99)
HCT VFR BLD AUTO: 46.5 % (ref 37–47)
HDLC SERPL-MCNC: 29 MG/DL
HGB BLD-MCNC: 14.5 G/DL (ref 12–16)
IMM GRANULOCYTES # BLD AUTO: 0.05 K/UL (ref 0–0.11)
IMM GRANULOCYTES NFR BLD AUTO: 0.6 % (ref 0–0.9)
LDLC SERPL CALC-MCNC: 44 MG/DL
LYMPHOCYTES # BLD AUTO: 2.31 K/UL (ref 1–4.8)
LYMPHOCYTES NFR BLD: 28.9 % (ref 22–41)
MAGNESIUM SERPL-MCNC: 1.7 MG/DL (ref 1.5–2.5)
MCH RBC QN AUTO: 31.9 PG (ref 27–33)
MCHC RBC AUTO-ENTMCNC: 31.2 G/DL (ref 33.6–35)
MCV RBC AUTO: 102.4 FL (ref 81.4–97.8)
MONOCYTES # BLD AUTO: 0.49 K/UL (ref 0–0.85)
MONOCYTES NFR BLD AUTO: 6.1 % (ref 0–13.4)
NEUTROPHILS # BLD AUTO: 5.06 K/UL (ref 2–7.15)
NEUTROPHILS NFR BLD: 63.3 % (ref 44–72)
NRBC # BLD AUTO: 0 K/UL
NRBC BLD-RTO: 0 /100 WBC
PHOSPHATE SERPL-MCNC: 4.3 MG/DL (ref 2.5–4.5)
PLATELET # BLD AUTO: 309 K/UL (ref 164–446)
PMV BLD AUTO: 11 FL (ref 9–12.9)
POTASSIUM SERPL-SCNC: 3.8 MMOL/L (ref 3.6–5.5)
POTASSIUM SERPL-SCNC: 3.8 MMOL/L (ref 3.6–5.5)
PROT SERPL-MCNC: 6.9 G/DL (ref 6–8.2)
PROT SERPL-MCNC: 7 G/DL (ref 6–8.2)
RBC # BLD AUTO: 4.54 M/UL (ref 4.2–5.4)
SODIUM SERPL-SCNC: 137 MMOL/L (ref 135–145)
SODIUM SERPL-SCNC: 139 MMOL/L (ref 135–145)
TRIGL SERPL-MCNC: 88 MG/DL (ref 0–149)
TROPONIN I SERPL-MCNC: 0.04 NG/ML (ref 0–0.04)
WBC # BLD AUTO: 8 K/UL (ref 4.8–10.8)

## 2018-01-01 PROCEDURE — 93005 ELECTROCARDIOGRAM TRACING: CPT

## 2018-01-01 PROCEDURE — 85025 COMPLETE CBC W/AUTO DIFF WBC: CPT

## 2018-01-01 PROCEDURE — 99214 OFFICE O/P EST MOD 30 MIN: CPT | Performed by: PHYSICIAN ASSISTANT

## 2018-01-01 PROCEDURE — 93005 ELECTROCARDIOGRAM TRACING: CPT | Performed by: EMERGENCY MEDICINE

## 2018-01-01 PROCEDURE — 80053 COMPREHEN METABOLIC PANEL: CPT

## 2018-01-01 PROCEDURE — 700111 HCHG RX REV CODE 636 W/ 250 OVERRIDE (IP): Performed by: EMERGENCY MEDICINE

## 2018-01-01 PROCEDURE — A9270 NON-COVERED ITEM OR SERVICE: HCPCS | Performed by: EMERGENCY MEDICINE

## 2018-01-01 PROCEDURE — 84484 ASSAY OF TROPONIN QUANT: CPT

## 2018-01-01 PROCEDURE — 36415 COLL VENOUS BLD VENIPUNCTURE: CPT

## 2018-01-01 PROCEDURE — 700102 HCHG RX REV CODE 250 W/ 637 OVERRIDE(OP): Performed by: EMERGENCY MEDICINE

## 2018-01-01 PROCEDURE — 99999 PR NO CHARGE: CPT | Performed by: PHYSICIAN ASSISTANT

## 2018-01-01 PROCEDURE — 99285 EMERGENCY DEPT VISIT HI MDM: CPT

## 2018-01-01 PROCEDURE — 99214 OFFICE O/P EST MOD 30 MIN: CPT | Performed by: FAMILY MEDICINE

## 2018-01-01 PROCEDURE — 80061 LIPID PANEL: CPT

## 2018-01-01 PROCEDURE — 96374 THER/PROPH/DIAG INJ IV PUSH: CPT

## 2018-01-01 PROCEDURE — G0438 PPPS, INITIAL VISIT: HCPCS | Performed by: PHYSICIAN ASSISTANT

## 2018-01-01 PROCEDURE — 84100 ASSAY OF PHOSPHORUS: CPT

## 2018-01-01 PROCEDURE — 71045 X-RAY EXAM CHEST 1 VIEW: CPT

## 2018-01-01 PROCEDURE — 83880 ASSAY OF NATRIURETIC PEPTIDE: CPT

## 2018-01-01 PROCEDURE — 83735 ASSAY OF MAGNESIUM: CPT

## 2018-01-01 PROCEDURE — 99213 OFFICE O/P EST LOW 20 MIN: CPT | Performed by: PHYSICIAN ASSISTANT

## 2018-01-01 PROCEDURE — 304561 HCHG STAT O2

## 2018-01-01 RX ORDER — FUROSEMIDE 20 MG/1
20 TABLET ORAL 2 TIMES DAILY
Qty: 180 TAB | Refills: 3 | Status: SHIPPED | OUTPATIENT
Start: 2018-01-01

## 2018-01-01 RX ORDER — TIZANIDINE 2 MG/1
TABLET ORAL
Qty: 60 TAB | Refills: 3 | Status: SHIPPED | OUTPATIENT
Start: 2018-01-01 | End: 2018-01-01 | Stop reason: SDUPTHER

## 2018-01-01 RX ORDER — ASPIRIN 81 MG/1
324 TABLET, CHEWABLE ORAL ONCE
Status: COMPLETED | OUTPATIENT
Start: 2018-01-01 | End: 2018-01-01

## 2018-01-01 RX ORDER — FUROSEMIDE 10 MG/ML
20 INJECTION INTRAMUSCULAR; INTRAVENOUS ONCE
Status: COMPLETED | OUTPATIENT
Start: 2018-01-01 | End: 2018-01-01

## 2018-01-01 RX ORDER — FUROSEMIDE 20 MG/1
20 TABLET ORAL 2 TIMES DAILY
Qty: 60 TAB | Refills: 0 | Status: SHIPPED | OUTPATIENT
Start: 2018-01-01 | End: 2018-01-01 | Stop reason: SDUPTHER

## 2018-01-01 RX ORDER — BENZONATATE 100 MG/1
100 CAPSULE ORAL 3 TIMES DAILY PRN
Qty: 60 CAP | Refills: 0 | Status: SHIPPED | OUTPATIENT
Start: 2018-01-01 | End: 2018-01-01

## 2018-01-01 RX ORDER — TIZANIDINE 2 MG/1
4 TABLET ORAL
COMMUNITY
End: 2018-01-01 | Stop reason: SDUPTHER

## 2018-01-01 RX ORDER — TIZANIDINE 2 MG/1
TABLET ORAL
Qty: 60 TAB | Refills: 0 | Status: SHIPPED | OUTPATIENT
Start: 2018-01-01 | End: 2018-01-01 | Stop reason: SDUPTHER

## 2018-01-01 RX ORDER — POTASSIUM CHLORIDE 750 MG/1
10 TABLET, FILM COATED, EXTENDED RELEASE ORAL DAILY
Qty: 90 TAB | Refills: 3 | Status: SHIPPED | OUTPATIENT
Start: 2018-01-01

## 2018-01-01 RX ORDER — ATENOLOL 50 MG/1
50 TABLET ORAL DAILY
Qty: 30 TAB | Refills: 3 | Status: SHIPPED | OUTPATIENT
Start: 2018-01-01 | End: 2018-01-01 | Stop reason: SDUPTHER

## 2018-01-01 RX ORDER — SUMATRIPTAN 25 MG/1
TABLET, FILM COATED ORAL
Qty: 9 TAB | Refills: 3 | Status: SHIPPED | OUTPATIENT
Start: 2018-01-01 | End: 2018-01-01 | Stop reason: SDUPTHER

## 2018-01-01 RX ORDER — SUMATRIPTAN 25 MG/1
TABLET, FILM COATED ORAL
Qty: 9 TAB | Refills: 0 | Status: SHIPPED | OUTPATIENT
Start: 2018-01-01 | End: 2018-01-01 | Stop reason: SDUPTHER

## 2018-01-01 RX ORDER — TIZANIDINE 2 MG/1
4 TABLET ORAL
Qty: 90 TAB | Refills: 2 | Status: SHIPPED | OUTPATIENT
Start: 2018-01-01

## 2018-01-01 RX ORDER — ATENOLOL 50 MG/1
50 TABLET ORAL DAILY
Qty: 90 TAB | Refills: 3 | Status: SHIPPED | OUTPATIENT
Start: 2018-01-01

## 2018-01-01 RX ORDER — SUMATRIPTAN 25 MG/1
TABLET, FILM COATED ORAL
Qty: 9 TAB | Refills: 2 | Status: SHIPPED | OUTPATIENT
Start: 2018-01-01 | End: 2019-01-01 | Stop reason: SDUPTHER

## 2018-01-01 RX ORDER — TIZANIDINE 2 MG/1
TABLET ORAL
Qty: 60 TAB | Refills: 2 | Status: SHIPPED | OUTPATIENT
Start: 2018-01-01 | End: 2018-01-01 | Stop reason: CLARIF

## 2018-01-01 RX ORDER — OMEPRAZOLE 20 MG/1
20 CAPSULE, DELAYED RELEASE ORAL DAILY
Qty: 90 CAP | Refills: 3 | Status: SHIPPED | OUTPATIENT
Start: 2018-01-01 | End: 2018-01-01 | Stop reason: CLARIF

## 2018-01-01 RX ORDER — OMEPRAZOLE 20 MG/1
20 CAPSULE, DELAYED RELEASE ORAL
Status: ON HOLD | COMMUNITY
End: 2019-01-01

## 2018-01-01 RX ADMIN — ASPIRIN 243 MG: 81 TABLET, CHEWABLE ORAL at 13:42

## 2018-01-01 RX ADMIN — FUROSEMIDE 20 MG: 10 INJECTION, SOLUTION INTRAMUSCULAR; INTRAVENOUS at 11:19

## 2018-01-01 ASSESSMENT — ACTIVITIES OF DAILY LIVING (ADL): BATHING_REQUIRES_ASSISTANCE: 0

## 2018-01-01 ASSESSMENT — PATIENT HEALTH QUESTIONNAIRE - PHQ9: CLINICAL INTERPRETATION OF PHQ2 SCORE: 0

## 2018-01-01 ASSESSMENT — PAIN SCALES - WONG BAKER: WONGBAKER_NUMERICALRESPONSE: DOESN'T HURT AT ALL

## 2018-01-01 ASSESSMENT — PAIN SCALES - GENERAL
PAINLEVEL: NO PAIN
PAINLEVEL_OUTOF10: 0

## 2018-01-11 ENCOUNTER — TELEPHONE (OUTPATIENT)
Dept: MEDICAL GROUP | Facility: PHYSICIAN GROUP | Age: 78
End: 2018-01-11

## 2018-01-11 NOTE — TELEPHONE ENCOUNTER
ESTABLISHED PATIENT PRE-VISIT PLANNING     Note: Patient will not be contacted if there is no indication to call.     1.  Reviewed notes from the last few office visits within the medical group: Yes    2.  If any orders were placed at last visit or intended to be done for this visit (i.e. 6 mos follow-up), do we have Results/Consult Notes?        •  Labs - Labs ordered, completed on 12/11/17 and results are in chart.   Note: If patient appointment is for lab review and patient did not complete labs, check with provider if OK to reschedule patient until labs completed.       •  Imaging - Imaging was not ordered at last office visit.       •  Referrals - No referrals were ordered at last office visit.    3. Is this appointment scheduled as a Hospital Follow-Up? No    4.  Immunizations were updated in [employer] using WebIZ?: Yes       •  Web Iz Recommendations: MMR     5.  Patient is due for the following Health Maintenance Topics:   Health Maintenance Due   Topic Date Due   • Annual Wellness Visit  1940   • PAP SMEAR  06/17/1960   • MAMMOGRAM  06/17/1979   • COLONOSCOPY  06/17/1989   • BONE DENSITY  06/17/2004   • IMM INFLUENZA (1) 09/01/2017       - Patient has completed FLU, PNEUMOVAX (PPSV23), PREVNAR (PCV13) , TD, TDAP and ZOSTAVAX (Shingles) Immunization(s) per WebIZ. Chart has been updated.      6.  Patient was NOT informed to arrive 15 min prior to their scheduled appointment and bring in their medication bottles.

## 2018-01-12 ENCOUNTER — OFFICE VISIT (OUTPATIENT)
Dept: MEDICAL GROUP | Facility: PHYSICIAN GROUP | Age: 78
End: 2018-01-12
Payer: MEDICARE

## 2018-01-12 VITALS
SYSTOLIC BLOOD PRESSURE: 120 MMHG | OXYGEN SATURATION: 97 % | RESPIRATION RATE: 16 BRPM | HEIGHT: 58 IN | WEIGHT: 113.8 LBS | BODY MASS INDEX: 23.89 KG/M2 | HEART RATE: 64 BPM | TEMPERATURE: 100.2 F | DIASTOLIC BLOOD PRESSURE: 72 MMHG

## 2018-01-12 DIAGNOSIS — E78.5 DYSLIPIDEMIA: ICD-10-CM

## 2018-01-12 DIAGNOSIS — I35.0 AORTIC STENOSIS, SEVERE: ICD-10-CM

## 2018-01-12 DIAGNOSIS — G43.909 MIGRAINE WITHOUT STATUS MIGRAINOSUS, NOT INTRACTABLE, UNSPECIFIED MIGRAINE TYPE: ICD-10-CM

## 2018-01-12 DIAGNOSIS — M19.90 ARTHRITIS: ICD-10-CM

## 2018-01-12 DIAGNOSIS — E87.6 HYPOKALEMIA: ICD-10-CM

## 2018-01-12 DIAGNOSIS — I10 ESSENTIAL HYPERTENSION, BENIGN: ICD-10-CM

## 2018-01-12 DIAGNOSIS — K21.9 GASTROESOPHAGEAL REFLUX DISEASE WITHOUT ESOPHAGITIS: ICD-10-CM

## 2018-01-12 PROCEDURE — 99214 OFFICE O/P EST MOD 30 MIN: CPT | Performed by: INTERNAL MEDICINE

## 2018-01-12 RX ORDER — ATENOLOL AND CHLORTHALIDONE TABLET 100; 25 MG/1; MG/1
1 TABLET ORAL DAILY
Qty: 90 TAB | Refills: 3 | Status: SHIPPED | OUTPATIENT
Start: 2018-01-12 | End: 2018-01-01

## 2018-01-12 RX ORDER — GABAPENTIN 600 MG/1
600 TABLET ORAL 3 TIMES DAILY
Qty: 270 TAB | Refills: 3 | Status: SHIPPED | OUTPATIENT
Start: 2018-01-12

## 2018-01-12 RX ORDER — POTASSIUM CHLORIDE 750 MG/1
10 TABLET, FILM COATED, EXTENDED RELEASE ORAL DAILY
Qty: 90 TAB | Refills: 3 | Status: SHIPPED | OUTPATIENT
Start: 2018-01-12 | End: 2018-01-01 | Stop reason: SDUPTHER

## 2018-01-12 RX ORDER — GEMFIBROZIL 600 MG/1
600 TABLET, FILM COATED ORAL 2 TIMES DAILY
Qty: 180 TAB | Refills: 3 | Status: SHIPPED | OUTPATIENT
Start: 2018-01-12

## 2018-01-12 RX ORDER — TRAMADOL HYDROCHLORIDE 50 MG/1
50 TABLET ORAL 2 TIMES DAILY PRN
Qty: 60 TAB | Refills: 0 | Status: SHIPPED | OUTPATIENT
Start: 2018-02-12 | End: 2018-01-12 | Stop reason: SDUPTHER

## 2018-01-12 RX ORDER — TRAMADOL HYDROCHLORIDE 50 MG/1
50 TABLET ORAL 2 TIMES DAILY PRN
Qty: 60 TAB | Refills: 3 | Status: SHIPPED | OUTPATIENT
Start: 2018-01-12 | End: 2018-01-12 | Stop reason: SDUPTHER

## 2018-01-12 RX ORDER — TRAMADOL HYDROCHLORIDE 50 MG/1
50 TABLET ORAL 2 TIMES DAILY PRN
Qty: 60 TAB | Refills: 0 | Status: SHIPPED | OUTPATIENT
Start: 2018-01-01 | End: 2018-01-01

## 2018-01-12 ASSESSMENT — PAIN SCALES - GENERAL: PAINLEVEL: 3=SLIGHT PAIN

## 2018-01-12 NOTE — PROGRESS NOTES
Subjective:  Tori is a 77 y.o. female with the following   Past Medical History:   Diagnosis Date   • Aortic stenosis    • Arthritis    • Breath shortness    • Dental disorder    • GERD (gastroesophageal reflux disease)    • Heart murmur    • Hyperlipidemia    • Hypertension    • Pain 10/7/16    lower back, hips and legs   • Restless leg syndrome     Dr. adler    • Stroke (CMS-[facility])     had in 1970s   • Urinary tract infection, site not specified       Family History   Problem Relation Age of Onset   • Adopted: Yes     The patient is on the following medications:   Current Outpatient Prescriptions:   •  atenolol-chlorthalidone (TENORETIC) 100-25 MG per tablet, TAKE ONE TABLET BY MOUTH ONE TIME DAILY, Disp: 90 Tab, Rfl: 0  •  tizanidine (ZANAFLEX) 2 MG tablet, TAKE TWO TABLETS BY MOUTH TWO TIMES DAILY AS NEEDED, Disp: 60 Tab, Rfl: 0  •  SUMAtriptan (IMITREX) 25 MG Tab tablet, TAKE 1 TO 4 TABLETS BY MOUTH ONCE AS NEEDED FOR MIGRAINE FOR UP TO 1 DOSE, Disp: 9 Tab, Rfl: 0  •  fluticasone (FLONASE) 50 MCG/ACT nasal spray, Spray 1 Spray in nose every day., Disp: 16 g, Rfl: 0  •  gabapentin (NEURONTIN) 600 MG tablet, Take 1 Tab by mouth 3 times a day., Disp: 90 Tab, Rfl: 6  •  tramadol (ULTRAM) 50 MG Tab, Take 1 Tab by mouth 2 times a day as needed., Disp: 60 Tab, Rfl: 3  •  gemfibrozil (LOPID) 600 MG Tab, Take 1 Tab by mouth 2 times a day., Disp: 180 Tab, Rfl: 3  •  omeprazole (PRILOSEC OTC) 20 MG tablet, Take 1 Tab by mouth every day., Disp: 90 Tab, Rfl: 3  •  esomeprazole (NEXIUM) 20 MG capsule, Take 20 mg by mouth every morning before breakfast., Disp: , Rfl:   •  Chlorphen-Pseudoeph-Methscop (ALLERGY AM/PM PO), Take  by mouth., Disp: , Rfl:   •  Cyanocobalamin (VITAMIN B 12 PO), Take  by mouth., Disp: , Rfl:   •  aspirin EC (ECOTRIN) 81 MG TBEC, Take 81 mg by mouth every day.  , Disp: , Rfl:   •  Cholecalciferol (VITAMIN D-3 PO), Take 1 Cap by mouth every day.  , Disp: , Rfl:   •  Omega-3 Fatty Acids (FISH OIL PO),  "Take 1 Cap by mouth every day.  , Disp: , Rfl:   •  Multiple Vitamin (MULTI-VITAMIN) TABS, Take 1 Tab by mouth every day.  , Disp: , Rfl:   •  POTASSIUM PO, Take 1 Tab by mouth every day.  , Disp: , Rfl:   •  VITAMIN A PO, Take 1 Tab by mouth every day.  , Disp: , Rfl:     HPI; patient is here today for follow-up visit regarding her hypertension, currently on Tenoretic and potassium supplements denies having chest pain, palpitation or shortness of breath, also on Lopid for dyslipidemia and denies having myalgia. Patient continues with omeprazole as needed for care denies having dysphagia, tramadol for osteoarthritic pain requesting refill of medication denies having recent fall or injuries.Also on  Imitrex as needed for migraine headache, currently is asymptomatic.     ROS: All other systems reviewed and they are negative.    Blood pressure 120/72, pulse 64, temperature 37.9 °C (100.2 °F), resp. rate 16, height 1.461 m (4' 9.5\"), weight 51.6 kg (113 lb 12.8 oz), SpO2 97 %, not currently breastfeeding.on RA        Objective:      Patient is well appearing and in no acute distress.  Eyes; pupils are equally reactive to light and accommodation. Ears are clear, no tympanic membranes bulging. Pharynx is clear.  Neck is soft and supple, nontender,no thyromegaly or mass.  lymphatic;  no cervical or supraclavicular lymphadenopathy.  Respiratory;  Lungs clear to auscultation bilaterally with normal respiratory effort. Gastrointestinal; abdomen is soft, non-tender on palpation,not distended. Cardiovascular ; Heart regular rate and rhythm rhythm with murmur, no JVD.  Extremities without any clubbing, cyanosis, or edema. Neuro - psychiatric ;  alert and oriented to time, location and person, motor and sensory intact. Skin; no rash or erythema. Musculoskeletal; no tenderness on palpation, no joint swelling or erythema    Assessment ;   1. Severe Aortic ; patient has preferred no surgical intervention, denies having dyspnea or " chest pain with daily walking.         2. Chronic pain;  multiple joints including back, responds to gabapentin 600 mg tablets , tramadol 50 mg and Zanaflex 2 mg as needed.         3. Migraine headache; responds to Imitrex or tramadol as needed.        4. GERD; responds to omeprazole 20 mg daily.        5. HTN/ hypokalemia; BP is stable on Tenoretic 100-25 milligrams daily, otc Kcl..         Ref. Range 1/27/2017 08:00 1/27/2017 08:02 12/11/2017 07:33   Potassium Latest Ref Range: 3.6 - 5.5 mmol/L 3.7 3.7 3.5 (L)      Ref. Range 1/27/2017 08:00 1/27/2017 08:02 12/11/2017 07:33   Creatinine Latest Ref Range: 0.50 - 1.40 mg/dL 0.85 0.84 0.72   GFR If  Latest Ref Range: >60 mL/min/1.73 m 2 >60 >60 >60   GFR If Non  Latest Ref Range: >60 mL/min/1.73 m 2 >60 >60 >60     6. Dyslipidemia;on  Lopid 600 mg by mouth twice a day.            Patient is advised on preventive and supportive care, diet and lifestyle modification, daily walking as tolerated,  potassium 10 mEq daily, tramadol 50 mg as needed, patient also has signed subsequent control agreement,  monitor labs.    Please note that this dictation was created using voice recognition software. I have worked with consultants from the vendor as well as technical [facility] from [facility] to optimize the interface. I have made every reasonable attempt to correct obvious errors, but I expect that there are errors of grammar and possibly content that I did not discover before finalizing the note.

## 2018-01-19 RX ORDER — OMEPRAZOLE 20 MG/1
20 CAPSULE, DELAYED RELEASE ORAL DAILY
Qty: 90 CAP | Refills: 0 | Status: SHIPPED | OUTPATIENT
Start: 2018-01-19 | End: 2018-01-01 | Stop reason: SDUPTHER

## 2018-02-07 ENCOUNTER — OFFICE VISIT (OUTPATIENT)
Dept: MEDICAL GROUP | Facility: PHYSICIAN GROUP | Age: 78
End: 2018-02-07
Payer: MEDICARE

## 2018-02-07 VITALS
HEIGHT: 58 IN | OXYGEN SATURATION: 97 % | HEART RATE: 71 BPM | DIASTOLIC BLOOD PRESSURE: 86 MMHG | BODY MASS INDEX: 23.09 KG/M2 | SYSTOLIC BLOOD PRESSURE: 120 MMHG | RESPIRATION RATE: 18 BRPM | WEIGHT: 110 LBS | TEMPERATURE: 98 F

## 2018-02-07 DIAGNOSIS — I10 ESSENTIAL HYPERTENSION, BENIGN: ICD-10-CM

## 2018-02-07 DIAGNOSIS — I35.0 AORTIC STENOSIS, SEVERE: ICD-10-CM

## 2018-02-07 DIAGNOSIS — G43.909 MIGRAINE WITHOUT STATUS MIGRAINOSUS, NOT INTRACTABLE, UNSPECIFIED MIGRAINE TYPE: ICD-10-CM

## 2018-02-07 DIAGNOSIS — Z13.1 SCREENING FOR DIABETES MELLITUS (DM): ICD-10-CM

## 2018-02-07 DIAGNOSIS — E87.6 LOW SERUM POTASSIUM: ICD-10-CM

## 2018-02-07 DIAGNOSIS — M19.90 ARTHRITIS: ICD-10-CM

## 2018-02-07 DIAGNOSIS — R25.2 MUSCLE CRAMP: ICD-10-CM

## 2018-02-07 DIAGNOSIS — E78.5 DYSLIPIDEMIA: ICD-10-CM

## 2018-02-07 DIAGNOSIS — Z13.6 SCREENING FOR CARDIOVASCULAR CONDITION: ICD-10-CM

## 2018-02-07 DIAGNOSIS — K21.9 GASTROESOPHAGEAL REFLUX DISEASE WITHOUT ESOPHAGITIS: ICD-10-CM

## 2018-02-07 PROCEDURE — 99214 OFFICE O/P EST MOD 30 MIN: CPT | Performed by: PHYSICIAN ASSISTANT

## 2018-02-07 RX ORDER — TIZANIDINE 2 MG/1
TABLET ORAL
Qty: 60 TAB | Refills: 0 | Status: SHIPPED | OUTPATIENT
Start: 2018-02-07 | End: 2018-01-01

## 2018-02-07 ASSESSMENT — PAIN SCALES - GENERAL: PAINLEVEL: NO PAIN

## 2018-02-07 NOTE — ASSESSMENT & PLAN NOTE
Patient states she has experienced migraine since she was 7 years old. States she rarely experiences migraines since she's gotten older. States she will experience a migraine once every 6 months. States she is currently prescribed Imitrex 25 mg tab as needed. States symptoms are managed with medication. States migraine symptoms in the past involved nausea, vomiting, photosensitive, phonophobia, aura.

## 2018-02-07 NOTE — ASSESSMENT & PLAN NOTE
Patient states she was diagnosed with dyslipidemia several years ago. States she's currently taking Gemfibrozil 600 mg tablet bid. That she is compliant with medication and experiences no side effects or complications from medication.

## 2018-02-07 NOTE — ASSESSMENT & PLAN NOTE
Chronic but stable condition. Patient states she's currently taking omeprazole 20 mg delayed release capsule once daily. Patient admits to eating late at night, eating and lying down, and drinking coffee every morning. States coffee does not aggravate her symptoms. Denies eating spicy or fried foods. Amidst eating small meals. Patient states she will take over-the-counter Tums in combination with Prilosec as needed. Denies difficulty with swallowing, sore throat, unintentional weight loss.

## 2018-02-07 NOTE — ASSESSMENT & PLAN NOTE
Patient states for several years she suffered from pain of multiple joints. States pain is a constant aching non radiating pain. States she is prescribed tramadol 50 milligrams twice a day. States she is compliant with medication and experiences no side effects or complications from medication. Denies alcohol use. Denies drug use. States she is also prescribed Zanaflex 2 mg tablet for chronic pain and muscle spasms. Denies side effects or complications from medication. States she is compliant with medication.

## 2018-02-09 PROBLEM — E87.6 LOW SERUM POTASSIUM: Status: ACTIVE | Noted: 2018-02-09

## 2018-02-10 NOTE — ASSESSMENT & PLAN NOTE
Per lab review. Patient's potassium was low in 12/17. She was prescribed potassium chloride 10 mEq tablet once daily. States she is compliant with medication and experiences no side effects or complications from medication. Denies chest pain, shortness of breath, palpitations, dizziness, syncope.

## 2018-02-10 NOTE — PROGRESS NOTES
Chief Complaint   Patient presents with   • Establish Care     Cold symptoms last couple of weeks.       HISTORY OF PRESENT ILLNESS: Tori Guajardo is an established 77 y.o. female here to discuss the evaluation and management of:    Dyslipidemia  Patient states she was diagnosed with dyslipidemia several years ago. States she's currently taking Gemfibrozil 600 mg tablet bid. That she is compliant with medication and experiences no side effects or complications from medication.      GERD (gastroesophageal reflux disease)  Chronic but stable condition. Patient states she's currently taking omeprazole 20 mg delayed release capsule once daily. Patient admits to eating late at night, eating and lying down, and drinking coffee every morning. States coffee does not aggravate her symptoms. Denies eating spicy or fried foods. Amidst eating small meals. Patient states she will take over-the-counter Tums in combination with Prilosec as needed. Denies difficulty with swallowing, sore throat, unintentional weight loss.      Headache, migraine  Patient states she has experienced migraine since she was 7 years old. States she rarely experiences migraines since she's gotten older. States she will experience a migraine once every 6 months. States she is currently prescribed Imitrex 25 mg tab as needed. States symptoms are managed with medication. States migraine symptoms in the past involved nausea, vomiting, photosensitive, phonophobia, aura.       Arthritis  Patient states for several years she suffered from pain of multiple joints. States pain is a constant aching non radiating pain. States she is prescribed tramadol 50 milligrams twice a day. States she is compliant with medication and experiences no side effects or complications from medication. Denies alcohol use. Denies drug use. States she is also prescribed Zanaflex 2 mg tablet for chronic pain and muscle spasms. Denies side effects or complications from medication.  States she is compliant with medication.    Essential hypertension, benign  Chronic but stable condition. Patient is currently taking atenolol-chlorthalidone 100-25 milligram tablet once daily. States she is compliant with medication and experiences no side effects or complications from medication. Denies chest pain, shortness of breath, palpitations, jaw claudication, diaphoresis, nausea, vomiting, abdominal pain, dizziness, syncope, vision changes.     Low serum potassium  Per lab review. Patient's potassium was low in 12/17. She was prescribed potassium chloride 10 mEq tablet once daily. States she is compliant with medication and experiences no side effects or complications from medication. Denies chest pain, shortness of breath, palpitations, dizziness, syncope.      Patient Active Problem List    Diagnosis Date Noted   • Low serum potassium 02/09/2018   • GERD (gastroesophageal reflux disease) 02/07/2018   • Arthritis 02/07/2018   • Combined forms of age-related cataract of left eye 11/08/2016   • Combined forms of age-related cataract of right eye 10/11/2016   • Dyslipidemia 03/04/2016   • Aortic stenosis, severe 03/04/2016   • Headache, migraine 03/04/2016   • Health care maintenance 03/04/2016   • Cardiomegaly 01/17/2013   • Essential hypertension, benign 01/17/2013   • Restless legs syndrome 01/17/2013       Allergies:Patient has no known allergies.    Current Outpatient Prescriptions   Medication Sig Dispense Refill   • tizanidine (ZANAFLEX) 2 MG tablet TAKE TWO TABLETS BY MOUTH TWO TIMES DAILY AS NEEDED 60 Tab 0   • omeprazole (PRILOSEC) 20 MG delayed-release capsule Take 1 Cap by mouth every day. 90 Cap 0   • potassium chloride ER (KLOR-CON) 10 MEQ tablet Take 1 Tab by mouth every day. 90 Tab 3   • [START ON 3/12/2018] tramadol (ULTRAM) 50 MG Tab Take 1 Tab by mouth 2 times a day as needed for up to 30 days. 60 Tab 0   • atenolol-chlorthalidone (TENORETIC) 100-25 MG per tablet Take 1 Tab by mouth every  day. 90 Tab 3   • gabapentin (NEURONTIN) 600 MG tablet Take 1 Tab by mouth 3 times a day. 270 Tab 3   • gemfibrozil (LOPID) 600 MG Tab Take 1 Tab by mouth 2 times a day. 180 Tab 3   • SUMAtriptan (IMITREX) 25 MG Tab tablet TAKE 1 TO 4 TABLETS BY MOUTH ONCE AS NEEDED FOR MIGRAINE FOR UP TO 1 DOSE 9 Tab 0   • fluticasone (FLONASE) 50 MCG/ACT nasal spray Spray 1 Spray in nose every day. 16 g 0   • Chlorphen-Pseudoeph-Methscop (ALLERGY AM/PM PO) Take  by mouth.     • Cyanocobalamin (VITAMIN B 12 PO) Take  by mouth.     • aspirin EC (ECOTRIN) 81 MG TBEC Take 81 mg by mouth every day.       • Cholecalciferol (VITAMIN D-3 PO) Take 1 Cap by mouth every day.       • Omega-3 Fatty Acids (FISH OIL PO) Take 1 Cap by mouth every day.       • Multiple Vitamin (MULTI-VITAMIN) TABS Take 1 Tab by mouth every day.       • POTASSIUM PO Take 1 Tab by mouth every day.       • VITAMIN A PO Take 1 Tab by mouth every day.         No current facility-administered medications for this visit.        Social History   Substance Use Topics   • Smoking status: Never Smoker   • Smokeless tobacco: Never Used   • Alcohol use No       Family Status   Relation Status   • Mother Other   • Father Other     Family History   Problem Relation Age of Onset   • Adopted: Yes       ROS:  Review of Systems   Constitutional: Negative for fever, chills, weight loss and malaise/fatigue.   HENT: Negative for ear pain, nosebleeds, congestion, sore throat and neck pain.    Eyes: Negative for blurred vision.   Respiratory: Negative for cough, sputum production, shortness of breath and wheezing.    Cardiovascular: Negative for chest pain, palpitations, orthopnea and leg swelling.   Gastrointestinal: Negative for heartburn, nausea, vomiting and abdominal pain.   Genitourinary: Negative for dysuria, urgency and frequency.   Musculoskeletal: Negative for myalgias, positive back pain and joint pain.   Skin: Negative for rash and itching.   Neurological: Negative for  "dizziness, tingling, tremors, sensory change, focal weakness and headaches.   Endo/Heme/Allergies: Does not bruise/bleed easily.   Psychiatric/Behavioral: Negative for depression, suicidal ideas and memory loss.  The patient is not nervous/anxious and does not have insomnia.    All other systems reviewed and are negative except as in HPI.    Exam: Blood pressure 120/86, pulse 71, temperature 36.7 °C (98 °F), resp. rate 18, height 1.461 m (4' 9.5\"), weight 49.9 kg (110 lb), SpO2 97 %, not currently breastfeeding. Body mass index is 23.39 kg/m².  General: Normal appearing. No distress.  HEENT: Normocephalic. Eyes conjunctiva clear lids without ptosis, pupils equal and reactive to light accommodation, ears normal shape and contour, canals are clear bilaterally, tympanic membranes are benign, nasal mucosa benign, oropharynx is without erythema, edema or exudates.   Neck: Supple without JVD or bruit. Thyroid is not enlarged.  Pulmonary: Clear to ausculation.  Normal effort. No rales, ronchi, or wheezing.  Cardiovascular: Regular rate and rhythm with aortic stenosis. Carotid and radial pulses are intact and equal bilaterally.  Abdomen: Soft, nontender, nondistended. Normal bowel sounds. Liver and spleen are not palpable. No CVA tenderness with palpation.  Neurologic: Grossly nonfocal.  Cranial nerves are normal. LE DTR's normal and symmetric.  Lymph: No cervical, supraclavicular or axillary lymph nodes are palpable  Skin: Warm and dry.  No rashes or suspicious skin lesions.  Musculoskeletal: Normal gait. No extremity cyanosis, clubbing, or edema.  Psych: Normal mood and affect. Alert and oriented x3. Judgment and insight is normal.    Medical decision-making and discussion:  1. Essential hypertension, benign  Well-controlled. Labs as indicated. Continue antihypertensive medications. Discussed decreasing salt intake. Emphasized benefits of exercise and diet. Continue to monitor.    - COMP METABOLIC PANEL; Future    2. " "Dyslipidemia  Labs as indicated. Continue current medication regimen. Continue to monitor.    - LIPID PROFILE; Future    3. Gastroesophageal reflux disease without esophagitis  This is a chronic and stable problem. Patient is doing well. No red flags. Continue PPI and monitor.  Ways to help your acid reflux:    · Eat smaller portions.  · Avoid lying down after meals.  · Normalize body weight.  · Avoid common reflux triggers such as spicy, greasy foods, peppers, onions.  · Avoid caffeine, carbonation, alcohol, tobacco.  · Raise the head of your bed by placing bricks or phone books between the floor and the legs of the head board.   · Use OTC medicines such as TUMS, Rolaids, Maalox, Gaviscon, Zantac, Pepcid, Tagamet.       4. Migraine without status migrainosus, not intractable, unspecified migraine type  Chronic but stable condition. Continue current medication regimen. Will monitor.    5. Arthritis  6. Muscle cramp  Patient is prescribed tramadol 50 mg twice a day. States she is compliant with medication and experiences no side effects or complications from medication.  Patient understands this prescription is a controlled substance which is potentially habit-forming and its use is regulated by the [place]. We also discussed the new \"black box\" warning regarding the lethal combination of opioids and benzodiazepines. Refills are subject to terms of a controlled substance agreement. Patient does not have an updated controlled substance agreement or a recent UDS. Discussed with patient when she needs a refill we will discuss in more detail controlled substance agreement form and obtain a urine drug screen. Any refill requires an office visit. Narcotics have may adverse effects and the risks of addiction, accidental overdose and death were emphasized.     - tizanidine (ZANAFLEX) 2 MG tablet; TAKE TWO TABLETS BY MOUTH TWO TIMES DAILY AS NEEDED  Dispense: 60 Tab; Refill: 0    7. Low serum potassium  Continue current " medication regimen. Will continue to monitor. A CMP has been ordered to further evaluate patient. Patient will follow-up in one month to review lab results.  - COMP METABOLIC PANEL; Future    8. Screening for cardiovascular condition  A lipid profile has been ordered to further evaluate patient for cardiovascular conditions. Patient will follow-up in one month to review labwork results.    - LIPID PROFILE; Future    9. Screening for diabetes mellitus (DM)  A CMP has been ordered to further evaluate patient for electrolyte abnormalities/liver and kidney function/diabetes. Patient will follow-up in one month to review labwork results.    - COMP METABOLIC PANEL; Future    10. Aortic stenosis, severe  Advised patient to follow-up with cardiology for further evaluation. Patient states she is unable to afford appointment and EKG. Highly encouraged patient to follow-up.  Will monitor.     Please note that this dictation was created using voice recognition software. I have made every reasonable attempt to correct obvious errors, but I expect that there are errors of grammar and possibly content that I did not discover before finalizing the note.      Return in about 2 weeks (around 2/21/2018).

## 2018-02-10 NOTE — ASSESSMENT & PLAN NOTE
Chronic but stable condition. Patient is currently taking atenolol-chlorthalidone 100-25 milligram tablet once daily. States she is compliant with medication and experiences no side effects or complications from medication. Denies chest pain, shortness of breath, palpitations, jaw claudication, diaphoresis, nausea, vomiting, abdominal pain, dizziness, syncope, vision changes.

## 2018-02-21 ENCOUNTER — APPOINTMENT (OUTPATIENT)
Dept: MEDICAL GROUP | Facility: PHYSICIAN GROUP | Age: 78
End: 2018-02-21
Payer: MEDICARE

## 2018-03-06 PROBLEM — R05.8 POST-VIRAL COUGH SYNDROME: Status: ACTIVE | Noted: 2018-01-01

## 2018-03-06 NOTE — ASSESSMENT & PLAN NOTE
Patient states one month ago she developed an URI infection. States she was experiencing a productive cough with thick green mucus. States she treated her symptoms with over-the-counter Coricidin with alleviation of symptoms. States she is still experiencing a persistent non productive dry cough. States she is also experiencing an intermittent generalized headache similar to past headaches. States she stays hydrated. States she has been continuing to take Coricidin. States Mucinex DM is too expensive.

## 2018-03-06 NOTE — ASSESSMENT & PLAN NOTE
Chronic condition. Advised patient to follow-up with cardiology for further evaluation. Patient states she is unable to afford appointment and EKG. States she is not going to follow up with cardiology because she does not want to proceed with surgery. Highly encouraged patient to follow-up. Will monitor.

## 2018-03-06 NOTE — ASSESSMENT & PLAN NOTE
Chronic but stable condition. Patient is currently taking atenolol-chlorthalidone 100-25 milligram tablet once daily. States she is compliant with medication and experiences no side effects or complications from medication. Monitors blood pressure at home and denies having abnormal readings. Denies chest pain, shortness of breath, palpitations, jaw claudication, diaphoresis, nausea, vomiting, abdominal pain, dizziness, syncope, vision changes.

## 2018-03-06 NOTE — PROGRESS NOTES
Chief Complaint   Patient presents with   • Follow-Up       HISTORY OF PRESENT ILLNESS: Tori Guajardo is an established 77 y.o. female here to discuss the evaluation and management of:    Post-viral cough syndrome  Patient states one month ago she developed an URI infection. States she was experiencing a productive cough with thick green mucus. States she treated her symptoms with over-the-counter Coricidin with alleviation of symptoms. States she is still experiencing a persistent non productive dry cough. States she is also experiencing an intermittent generalized headache similar to past headaches. States she stays hydrated. States she has been continuing to take Coricidin. States Mucinex DM is too expensive.       Essential hypertension, benign  Chronic but stable condition. Patient is currently taking atenolol-chlorthalidone 100-25 milligram tablet once daily. States she is compliant with medication and experiences no side effects or complications from medication. Monitors blood pressure at home and denies having abnormal readings. Denies chest pain, shortness of breath, palpitations, jaw claudication, diaphoresis, nausea, vomiting, abdominal pain, dizziness, syncope, vision changes.    Aortic stenosis, severe  Chronic condition. Advised patient to follow-up with cardiology for further evaluation. Patient states she is unable to afford appointment and EKG. States she is not going to follow up with cardiology because she does not want to proceed with surgery. Highly encouraged patient to follow-up. Will monitor.       Patient Active Problem List    Diagnosis Date Noted   • Post-viral cough syndrome 03/06/2018   • Low serum potassium 02/09/2018   • GERD (gastroesophageal reflux disease) 02/07/2018   • Arthritis 02/07/2018   • Combined forms of age-related cataract of left eye 11/08/2016   • Combined forms of age-related cataract of right eye 10/11/2016   • Dyslipidemia 03/04/2016   • Aortic stenosis, severe  03/04/2016   • Headache, migraine 03/04/2016   • Health care maintenance 03/04/2016   • Cardiomegaly 01/17/2013   • Essential hypertension, benign 01/17/2013   • Restless legs syndrome 01/17/2013       Allergies:Patient has no known allergies.    Current Outpatient Prescriptions   Medication Sig Dispense Refill   • benzonatate (TESSALON) 100 MG Cap Take 1 Cap by mouth 3 times a day as needed for Cough. 60 Cap 0   • tizanidine (ZANAFLEX) 2 MG tablet TAKE TWO TABLETS BY MOUTH TWO TIMES DAILY AS NEEDED 60 Tab 0   • omeprazole (PRILOSEC) 20 MG delayed-release capsule Take 1 Cap by mouth every day. 90 Cap 0   • potassium chloride ER (KLOR-CON) 10 MEQ tablet Take 1 Tab by mouth every day. 90 Tab 3   • [START ON 3/12/2018] tramadol (ULTRAM) 50 MG Tab Take 1 Tab by mouth 2 times a day as needed for up to 30 days. 60 Tab 0   • atenolol-chlorthalidone (TENORETIC) 100-25 MG per tablet Take 1 Tab by mouth every day. 90 Tab 3   • gabapentin (NEURONTIN) 600 MG tablet Take 1 Tab by mouth 3 times a day. 270 Tab 3   • gemfibrozil (LOPID) 600 MG Tab Take 1 Tab by mouth 2 times a day. 180 Tab 3   • SUMAtriptan (IMITREX) 25 MG Tab tablet TAKE 1 TO 4 TABLETS BY MOUTH ONCE AS NEEDED FOR MIGRAINE FOR UP TO 1 DOSE 9 Tab 0   • fluticasone (FLONASE) 50 MCG/ACT nasal spray Spray 1 Spray in nose every day. 16 g 0   • Chlorphen-Pseudoeph-Methscop (ALLERGY AM/PM PO) Take  by mouth.     • Cyanocobalamin (VITAMIN B 12 PO) Take  by mouth.     • aspirin EC (ECOTRIN) 81 MG TBEC Take 81 mg by mouth every day.       • Cholecalciferol (VITAMIN D-3 PO) Take 1 Cap by mouth every day.       • Omega-3 Fatty Acids (FISH OIL PO) Take 1 Cap by mouth every day.       • Multiple Vitamin (MULTI-VITAMIN) TABS Take 1 Tab by mouth every day.       • POTASSIUM PO Take 1 Tab by mouth every day.       • VITAMIN A PO Take 1 Tab by mouth every day.         No current facility-administered medications for this visit.        Social History   Substance Use Topics   •  "Smoking status: Never Smoker   • Smokeless tobacco: Never Used   • Alcohol use No       Family Status   Relation Status   • Mother Other   • Father Other   • Brother    • Daughter Alive   • Daughter Alive   • Daughter Alive   • Daughter Alive   • Son Alive   • Son Alive   • Son      Family History   Problem Relation Age of Onset   • Adopted: Yes   • Cancer Daughter      Cervical CA   • Cancer Daughter      Cervial CA   • Cancer Son      Esophageal Cancer       ROS:  Review of Systems   Constitutional: Negative for fever, chills, weight loss and malaise/fatigue.   HENT: Negative for ear pain, nosebleeds, congestion, sore throat and neck pain.    Eyes: Negative for blurred vision.   Respiratory: Negative for sputum production, shortness of breath and wheezing.  Positive for dry nonproductive cough.  Cardiovascular: Negative for chest pain, palpitations, orthopnea and leg swelling.   Gastrointestinal: Negative for heartburn, nausea, vomiting and abdominal pain.   Genitourinary: Negative for dysuria, urgency and frequency.   Musculoskeletal: Negative for myalgias, back pain and joint pain.   Skin: Negative for rash and itching.   Neurological: Negative for dizziness, tingling, tremors, sensory change, focal weakness and headaches.   Endo/Heme/Allergies: Does not bruise/bleed easily.   Psychiatric/Behavioral: Negative for depression, suicidal ideas and memory loss.  The patient is not nervous/anxious and does not have insomnia.    All other systems reviewed and are negative except as in HPI.    Exam: Blood pressure 118/82, pulse 69, temperature 36.2 °C (97.1 °F), resp. rate 16, height 1.461 m (4' 9.5\"), weight 51.3 kg (113 lb), SpO2 92 %, not currently breastfeeding. Body mass index is 24.03 kg/m².  General: Normal appearing. No distress.  HEENT: Normocephalic. Eyes conjunctiva clear lids without ptosis, pupils equal and reactive to light accommodation, ears normal shape and contour, canals are clear " bilaterally, tympanic membranes are benign, nasal mucosa benign, oropharynx is without erythema, edema or exudates.   Neck: Supple without JVD or bruit. Thyroid is not enlarged.  Pulmonary: Clear to ausculation.  Normal effort. No rales, ronchi, or wheezing.  Cardiovascular: Regular rate and rhythm with severe aortic stenosis. Carotid and radial pulses are intact and equal bilaterally.  Abdomen: Soft, nontender, nondistended. Normal bowel sounds. Liver and spleen are not palpable. No CVA tenderness with palpation.   Neurologic: Grossly nonfocal.  Cranial nerves are normal. LE DTR's normal and symmetric.  Lymph: No cervical, supraclavicular or axillary lymph nodes are palpable  Skin: Warm and dry.  No rashes or suspicious skin lesions.  Musculoskeletal: Normal gait. No extremity cyanosis, clubbing, or edema.  Psych: Normal mood and affect. Alert and oriented x3. Judgment and insight is normal.    Medical decision-making and discussion:  1. Post-viral cough syndrome  During today's point of patient has been prescribed benzonatate 100 mg By take one cap by mouth 3 times a day as needed for cough.  • Twice daily use of nasal saline rinse or Neti-Pot encouraged.   • OTC anti-pyretics and decongestants as needed. Suggested Coricidin. Coricidin and safe with patients with hypertension.  • Use OTC Mucinex DM to loosen mucous.  • Drink plenty of fluids to keep yourself hydrated. Warm fluids like tea and hot soup are better.  • Increase humidity in the house with a humidifier.  • Use Tylenol or Motrin for aches, pains, or fever.  • Get plenty of rest to allow your body time to heal.    • Follow-up for worsening symptoms, difficulty breathing, lack of expected recovery, or should new symptoms or problems arise.    - benzonatate (TESSALON) 100 MG Cap; Take 1 Cap by mouth 3 times a day as needed for Cough.  Dispense: 60 Cap; Refill: 0    2. Essential hypertension, benign  Well-controlled. Labs as indicated. Continue  antihypertensive medications. Discussed decreasing salt intake. Emphasized benefits of exercise and diet. Continue to monitor.      3. Aortic stenosis, severe  Advised patient to follow-up with cardiology for further evaluation. Patient states she is unable to afford appointment and EKG. states in the past when she followed up the recommended surgery and does not want to proceed with surgery. Highly encouraged patient to follow-up.  Will monitor.     Patient states she takes care of her  who has Alzheimer's. States emotionally and mentally she is doing well. Denies homicidal or suicidal ideation.    Please note that this dictation was created using voice recognition software. I have made every reasonable attempt to correct obvious errors, but I expect that there are errors of grammar and possibly content that I did not discover before finalizing the note.        Return in about 5 months (around 8/6/2018).

## 2018-03-08 NOTE — PROGRESS NOTES
1. Attempt #: 1    2. [facility] Verified: yes    3. Verify PCP: yes    4. Care Team Updated:       •   DME Company (gait device, O2, CPAP, etc.): YES       •   Other Specialists (eye doctor, derm, GYN, cardiology, endo, etc): YES    5.  Reviewed/Updated the following with patient:       •   Communication Preference Obtained? YES       •   Preferred Pharmacy? YES       •   Preferred Lab? YES       •   Family History (document living status of immediate family members and if + hx of cancer, diabetes, hypertension, hyperlipidemia, heart attack, stroke) YES. Was Abstract Encounter opened and chart updated? NO opened encounter, pt stated she was adopted, did not update    6. [employer] Activation: declined    7. [employer] Rhona: no    8. Annual Wellness Visit Scheduling  Scheduling Status:Scheduled      9. Care Gap Scheduling (Attempt to Schedule EACH Overdue Care Gap!)     Health Maintenance Due   Topic Date Due   • Annual Wellness Visit  1940        Scheduled patient for Annual Wellness Visit    10. Patient was advised: “This is a free wellness visit. The provider will screen for medical conditions to help you stay healthy. If you have other concerns to address you may be asked to discuss these at a separate visit or there may be an additional fee.”     11. Patient was informed to arrive 15 min prior to their scheduled appointment and bring in their medication bottles.

## 2018-03-28 NOTE — TELEPHONE ENCOUNTER
Left message for patient to call back regarding pre-visit planning. Please transfer call to Kassidy  at [phone].

## 2018-03-29 NOTE — TELEPHONE ENCOUNTER
PVP WITH OUTREACH  Future Appointments       Provider Department Center    4/3/2018 9:20 AM Isadora Martell P.A.-C.; [facility]  [facility]     8/8/2018 9:00 AM Isadora Martell P.A.-C. [facility]           ANNUAL WELLNESS VISIT PRE-VISIT PLANNING     1.  Immunizations were updated in Epic using WebIZ?: No WebIZ record       •  WebIZ Recommendations: Patient is up to date on all vaccines       •  Is patient due for Tdap? NO       •  Is patient due for Shingles? NO     2.  Specialty Comments was updated with diagnosis information provided by [facility]: NO

## 2018-03-30 NOTE — TELEPHONE ENCOUNTER
----- Message from Isadora Martell P.A.-C. sent at 3/29/2018  7:14 PM PDT -----  Please call patient. I have reviewed patient's lab work and liver enzyme is elevated. Will repeat in lab work in 3 months.   Testing glucose, electrolytes, and kidney function is within normal limits.        Thank you,    Elis PATE

## 2018-03-30 NOTE — TELEPHONE ENCOUNTER
VOICEMAIL  1. Caller Name: Tori Guajardo                        Call Back Number: [phone] (home)       2. Message: Called and left patient a message to call back to get lab results. LM     3. Patient approves office to leave a detailed voicemail/MyChart message: N\A

## 2018-04-03 PROBLEM — R05.8 POST-VIRAL COUGH SYNDROME: Status: RESOLVED | Noted: 2018-01-01 | Resolved: 2018-01-01

## 2018-04-03 PROBLEM — K08.9 DENTAL DISORDER: Status: ACTIVE | Noted: 2018-01-01

## 2018-04-03 NOTE — PROGRESS NOTES
Chief Complaint   Patient presents with   • Annual Wellness Visit         HPI:  Tori is a 77 y.o. here for Initial [facility] Annual Wellness Visit        Patient Active Problem List    Diagnosis Date Noted   • Dental disorder 04/03/2018   • Low serum potassium 02/09/2018   • GERD (gastroesophageal reflux disease) 02/07/2018   • Arthritis 02/07/2018   • Dyslipidemia 03/04/2016   • Aortic stenosis, severe 03/04/2016   • Headache, migraine 03/04/2016   • Cardiomegaly 01/17/2013   • Essential hypertension, benign 01/17/2013   • Restless legs syndrome 01/17/2013       Current Outpatient Prescriptions   Medication Sig Dispense Refill   • SUMAtriptan (IMITREX) 25 MG Tab tablet TAKE 1 TO 4 TABLETS BY MOUTH ONCE AS NEEDED FOR MIGRAINE FOR UP TO 1 DOSE 9 Tab 0   • omeprazole (PRILOSEC) 20 MG delayed-release capsule Take 1 Cap by mouth every day. 90 Cap 0   • potassium chloride ER (KLOR-CON) 10 MEQ tablet Take 1 Tab by mouth every day. 90 Tab 3   • tramadol (ULTRAM) 50 MG Tab Take 1 Tab by mouth 2 times a day as needed for up to 30 days. 60 Tab 0   • atenolol-chlorthalidone (TENORETIC) 100-25 MG per tablet Take 1 Tab by mouth every day. 90 Tab 3   • gabapentin (NEURONTIN) 600 MG tablet Take 1 Tab by mouth 3 times a day. 270 Tab 3   • gemfibrozil (LOPID) 600 MG Tab Take 1 Tab by mouth 2 times a day. 180 Tab 3   • Cyanocobalamin (VITAMIN B 12 PO) Take  by mouth.     • aspirin EC (ECOTRIN) 81 MG TBEC Take 81 mg by mouth every day.       • Cholecalciferol (VITAMIN D-3 PO) Take 1 Cap by mouth every day.       • Omega-3 Fatty Acids (FISH OIL PO) Take 1 Cap by mouth every day.       • Multiple Vitamin (MULTI-VITAMIN) TABS Take 1 Tab by mouth every day.       • VITAMIN A PO Take 1 Tab by mouth every day.       • fluticasone (FLONASE) 50 MCG/ACT nasal spray Spray 1 Spray in nose every day. 16 g 0   • Chlorphen-Pseudoeph-Methscop (ALLERGY AM/PM PO) Take  by mouth.     • POTASSIUM PO Take 1 Tab by mouth every day.         No  current facility-administered medications for this visit.         Patient is taking medications as noted in medication list.  Current supplements as per medication list.     Allergies: Patient has no known allergies.    Current social contact/activities: read, watch, TV   Patient's perception of their health: fair    Is patient current with immunizations? Yes.    She  reports that she has never smoked. She has never used smokeless tobacco. She reports that she does not drink alcohol or use drugs.  Counseling given: Yes        DPA/Advanced directive: Patient has Advanced Directive on file.     ROS:    Gait: Uses a walker   Ostomy: no   Other tubes: no   Amputations: no   Chronic oxygen use no   Last eye exam 01/18   Wears hearing aids: no   : Reports urinary leakage during the last 6 months that has somewhat interfered with their daily activities or sleep.      Screening:        Little interest or pleasure in doing things?  0 - not at all  Feeling down, depressed, or hopeless? 0 - not at all  Patient Health Questionnaire Score: 0    If depressive symptoms identified deferred to follow up visit unless specifically addressed in assessment and plan.    Interpretation of PHQ-9 Total Score   Score Severity   1-4 No Depression   5-9 Mild Depression   10-14 Moderate Depression   15-19 Moderately Severe Depression   20-27 Severe Depression    Screening for Cognitive Impairment    Three Minute Recall (apple, watch, horacio)  2/3 Banana, [place], Fence  Draw clock face with all 12 numbers set to the hand to show 10 minutes past 11 o'clock  Time 10:10    5/5  If cognitive concerns identified, deferred for follow up unless specifically addressed in assessment and plan.    Fall Risk Assessment    Has the patient had two or more falls in the last year or any fall with injury in the last year?  No  If fall risk identified, deferred for follow up unless specifically addressed in assessment and plan.    Safety Assessment    Throw  rugs on floor.  No  Handrails on all stairs.  Yes  Good lighting in all hallways.  Yes  Difficulty hearing.  No  Patient counseled about all safety risks that were identified.    Functional Assessment ADLs    Are there any barriers preventing you from cooking for yourself or meeting nutritional needs?  No .    Are there any barriers preventing you from driving safely or obtaining transportation?  Yes. [employer] bus  Are there any barriers preventing you from using a telephone or calling for help?  No.    Are there any barriers preventing you from shopping?  No.    Are there any barriers preventing you from taking care of your own finances?  No.    Are there any barriers preventing you from managing your medications?  No.    Are there any barriers preventing you from showering, bathing or dressing yourself?  No.    Are you currently engaging any exercise or physical activity?  Yes.  walking    Health Maintenance Summary                Annual Wellness Visit Overdue 1940     IMM DTaP/Tdap/Td Vaccine Next Due 4/4/2027      Done 4/4/2017 Imm Admin: Tdap Vaccine     Patient has more history with this topic...          Patient Care Team:  Isadora Martell P.A.-C. as PCP - General (Family Medicine)  Isacc Anna O.D. (Optometry)  Sam Whatley M.D. (Ophthalmology)    Social History   Substance Use Topics   • Smoking status: Never Smoker   • Smokeless tobacco: Never Used   • Alcohol use No     Family History   Problem Relation Age of Onset   • Adopted: Yes   • Cancer Daughter      Cervical CA   • Cancer Daughter      Cervial CA   • Cancer Son      Esophageal Cancer     She  has a past medical history of Aortic stenosis; Arthritis; Breath shortness; Dental disorder; GERD (gastroesophageal reflux disease); Heart murmur; Hyperlipidemia; Hypertension; Pain (10/7/16); Restless leg syndrome; Stroke (CMS-[facility]); and Urinary tract infection, site not specified. She also has no past medical history of Allergy or Diabetes.  "  Past Surgical History:   Procedure Laterality Date   • CATARACT PHACO WITH IOL Left 11/8/2016    Procedure: CATARACT PHACO WITH IOL;  Surgeon: Sam Whatley M.D.;  Location: [facility];  Service:    • CATARACT PHACO WITH IOL Right 10/11/2016    Procedure: CATARACT PHACO WITH IOL;  Surgeon: Sam Whatley M.D.;  Location: [facility];  Service:    • TUBAL COAGULATION LAPAROSCOPIC BILATERAL  1970           Exam:     Blood pressure 120/70, pulse 77, temperature 36.7 °C (98 °F), resp. rate 16, height 1.473 m (4' 10\"), weight 49.5 kg (109 lb 2 oz), SpO2 97 %. Body mass index is 22.81 kg/m².    Hearing good.    Dentition poor  Alert, oriented in no acute distress.  Eye contact is good, speech goal directed, affect calm      Assessment and Plan. The following treatment and monitoring plan is recommended:    1. Initial Medicare annual wellness visit  HRA reviewed and appropriate. Reviewed medical history and current medications with patient. Reviewed immunizations with patient.  Ambulatory and Anticipatory Guidelines have been discussed with patient, see discussion below.    - Initial Wellness Visit - Includes PPPS ()    2. Aortic stenosis, severe  Chronic. Patient refuses to follow up with cardiology in regards to aortic stenosis. States she cannot afford the workup. Will continue to monitor.    - Initial Wellness Visit - Includes PPPS ()    3. Restless legs syndrome  Chronic but stable condition. Continue current medication regimen. Will continue to monitor.  - Initial Wellness Visit - Includes PPPS ()    4. Migraine without status migrainosus, not intractable, unspecified migraine type  Chronic but stable condition. Continue current medication regimen. Will continue to monitor.  Refill patient's prescription during today's appointment.  - SUMAtriptan (IMITREX) 25 MG Tab tablet; TAKE 1 TO 4 TABLETS BY MOUTH ONCE AS NEEDED FOR MIGRAINE FOR UP TO 1 DOSE  Dispense: 9 Tab; " Refill: 0  - Initial Wellness Visit - Includes PPPS ()    5. Gastroesophageal reflux disease without esophagitis  This is a chronic and stable problem. Patient is doing well. No red flags. Continue PPI and monitor.    - Initial Wellness Visit - Includes PPPS ()    6. Essential hypertension, benign  Well-controlled. Continue antihypertensive medications. Discussed decreasing salt intake. Emphasized benefits of exercise and diet. Continue to monitor.    - Initial Wellness Visit - Includes PPPS ()    7. Dental disorder  Chronic condition. Patient states she does not follow-up the dentist regularly. States she is unable to afford the appointment.  - Initial Wellness Visit - Includes PPPS ()    8. Cardiomegaly  Chronic condition. Patient refuses to follow up with cardiology. Will continue to monitor.  - Initial Wellness Visit - Includes PPPS ()    9. Arthritis  Chronic condition. Continue current medication regimen. Will continue to monitor.  - Initial Wellness Visit - Includes PPPS ()    10. Dyslipidemia  Chronic condition. Continue medication. Continue to monitor.    - Initial Wellness Visit - Includes PPPS ()    11. Low serum potassium  Will continue to monitor. Patient has a positive medical history for low serum potassium.  - Initial Wellness Visit - Includes PPPS ()      Services suggested: No services needed at this time  Health Care Screening: Age-appropriate preventive services recommended by USPTF and ACIP covered by Medicare were discussed today. Services ordered if indicated and agreed upon by the patient.  Referrals offered: PT/OT/Nutrition counseling/Behavioral Health/Smoking cessation as per orders if indicated.    Discussion today about general wellness and lifestyle habits:    · Prevent falls and reduce trip hazards; Cautioned about securing or removing rugs.  · Have a working fire alarm and carbon monoxide detector;   · Engage in regular physical activity and  social activities       Follow-up: Return in about 1 year (around 4/3/2019).

## 2018-04-06 NOTE — TELEPHONE ENCOUNTER
VOICEMAIL  1. Caller Name: Tori Guajardo                        Call Back Number: [phone] (home)       2. Message: Called and left patient a message to call back to get lab results. LM     3. Patient approves office to leave a detailed voicemail/MyChart message: N\A

## 2018-08-08 PROBLEM — M62.838 NECK MUSCLE SPASM: Status: ACTIVE | Noted: 2018-01-01

## 2018-08-08 PROBLEM — M54.2 CHRONIC NECK PAIN: Status: ACTIVE | Noted: 2018-01-01

## 2018-08-08 PROBLEM — G89.29 CHRONIC NECK PAIN: Status: ACTIVE | Noted: 2018-01-01

## 2018-08-08 NOTE — PROGRESS NOTES
Chief Complaint   Patient presents with   • Neck Pain     causing headaches for a long time        HISTORY OF PRESENT ILLNESS: Tori Guajardo is an established 78 y.o. female here to discuss the evaluation and management of:    Neck muscle spasm  Chronic problem.  Patient states for several years she has been experiencing a constant low-grade aching pain of posterior neck and upper shoulders.  Admits that bilateral posterior shoulders are tender to deep palpation.  She tells me that symptoms are aggravated when she is stressed or tense.  States a massage would improved symptoms but she cannot afford massages.  She also mentions that she uses over-the-counter aspirin and prescribed muscle relaxer at night with moderate alleviation of symptoms.  She denies decreased  strength, muscle atrophy, muscle weakness, decreased range of motion.  Denies numbness/tingling.  Patient is inquiring about following up with a chiropractor.      Aortic stenosis, severe  Chronic condition.  Patient refuses to follow-up with cardiology for further evaluation.  She tells me she is financially unable to afford the appointment.  Highly encouraged patient to follow-up.  Will continue to monitor.  She mentions that she is working on diet and exercise.      Patient Active Problem List    Diagnosis Date Noted   • Neck muscle spasm 08/08/2018   • Dental disorder 04/03/2018   • Low serum potassium 02/09/2018   • GERD (gastroesophageal reflux disease) 02/07/2018   • Arthritis 02/07/2018   • Dyslipidemia 03/04/2016   • Aortic stenosis, severe 03/04/2016   • Headache, migraine 03/04/2016   • Cardiomegaly 01/17/2013   • Essential hypertension, benign 01/17/2013   • Restless legs syndrome 01/17/2013       Allergies:Patient has no known allergies.    Current Outpatient Prescriptions   Medication Sig Dispense Refill   • SUMAtriptan (IMITREX) 25 MG Tab tablet TAKE 1 TO 4 TABLETS BY MOUTH ONCE AS NEEDED FOR MIGRAINE FOR UP TO 1 DOSE 9 Tab 3   •  tizanidine (ZANAFLEX) 2 MG tablet TAKE TWO TABLETS BY MOUTH TWO TIMES DAILY AS NEEDED 60 Tab 3   • omeprazole (PRILOSEC) 20 MG delayed-release capsule Take 1 Cap by mouth every day. 90 Cap 3   • potassium chloride ER (KLOR-CON) 10 MEQ tablet Take 1 Tab by mouth every day. 90 Tab 3   • atenolol-chlorthalidone (TENORETIC) 100-25 MG per tablet Take 1 Tab by mouth every day. 90 Tab 3   • gabapentin (NEURONTIN) 600 MG tablet Take 1 Tab by mouth 3 times a day. 270 Tab 3   • gemfibrozil (LOPID) 600 MG Tab Take 1 Tab by mouth 2 times a day. 180 Tab 3   • fluticasone (FLONASE) 50 MCG/ACT nasal spray Spray 1 Spray in nose every day. 16 g 0   • Chlorphen-Pseudoeph-Methscop (ALLERGY AM/PM PO) Take  by mouth.     • Cyanocobalamin (VITAMIN B 12 PO) Take  by mouth.     • aspirin EC (ECOTRIN) 81 MG TBEC Take 81 mg by mouth every day.       • Cholecalciferol (VITAMIN D-3 PO) Take 1 Cap by mouth every day.       • Omega-3 Fatty Acids (FISH OIL PO) Take 1 Cap by mouth every day.       • Multiple Vitamin (MULTI-VITAMIN) TABS Take 1 Tab by mouth every day.       • POTASSIUM PO Take 1 Tab by mouth every day.       • VITAMIN A PO Take 1 Tab by mouth every day.         No current facility-administered medications for this visit.        Social History   Substance Use Topics   • Smoking status: Never Smoker   • Smokeless tobacco: Never Used   • Alcohol use No       Family Status   Relation Status   • Mo Other   • Fa Other   • Bro    • Wendy Alive   • Wendy Alive   • Wendy Alive   • Wendy Alive   • Son Alive   • Son Alive   • Son      Family History   Problem Relation Age of Onset   • Adopted: Yes   • Cancer Daughter         Cervical CA   • Cancer Daughter         Cervial CA   • Cancer Son         Esophageal Cancer       ROS:  Review of Systems   Constitutional: Negative for fever, chills, weight loss and malaise/fatigue.   HENT: Negative for ear pain, nosebleeds, congestion, sore throat and neck pain.    Eyes: Negative for blurred  "vision.   Respiratory: Negative for cough, sputum production, shortness of breath and wheezing.    Cardiovascular: Negative for chest pain, palpitations, orthopnea and leg swelling.   Gastrointestinal: Negative for heartburn, nausea, vomiting and abdominal pain.   Genitourinary: Negative for dysuria, urgency and frequency.   Musculoskeletal: Negative for back pain and joint pain. + for neck pain.  Skin: Negative for rash and itching.   Neurological: Negative for dizziness, tingling, tremors, sensory change, focal weakness and headaches.   Endo/Heme/Allergies: Does not bruise/bleed easily.   Psychiatric/Behavioral: Negative for depression, suicidal ideas and memory loss.  The patient is not nervous/anxious and does not have insomnia.    All other systems reviewed and are negative except as in HPI.    Exam: Pulse 67, temperature 36.5 °C (97.7 °F), height 1.473 m (4' 10\"), weight 51.3 kg (113 lb), SpO2 97 %, not currently breastfeeding. Body mass index is 23.62 kg/m².  General: Normal appearing. No distress.  HEENT: Normocephalic. Eyes conjunctiva clear lids without ptosis and ears normal shape and contour.  Neck: Supple without JVD or bruit. Thyroid is not enlarged.  Pulmonary: Clear to ausculation.  Normal effort. No rales, ronchi, or wheezing.  Cardiovascular: Regular rate and rhythm with aortic stenosis.  Abdomen: Soft, nontender, nondistended. Normal bowel sounds.   Neurologic: Grossly nonfocal.  Cranial nerves are normal.  Lymph: No cervical, supraclavicular or axillary lymph nodes are palpable  Skin: Warm and dry.  No rashes or suspicious skin lesions.  Musculoskeletal: Normal gait. No extremity cyanosis, clubbing, or edema.  Posterior bilateral shoulders are tender to deep palpation.  Cervical paraspinal muscles are tender to palpation.  Normal range of motion of head.  Negative for trauma.  Psych: Normal mood and affect. Alert and oriented x3. Judgment and insight is normal.    Medical decision-making and " discussion:  1. Neck muscle spasm  Advised patient to use heating pad, anti-inflammatories as needed, massage, stretch, and use prescribed muscle relaxer at night as indicated.  Also suggested using over-the-counter salonpas patches.  Advised patient when taking prescribed muscle relaxer do not drink alcohol, combine other sedating medications or operate machinery.  Discussed with patient for arthritic symptoms to use over-the-counter Tylenol as needed.  Patient is inquiring about chiropractor.  Will place referral when patient contacts me with the chiropractor that she would like to follow-up with.    2. Aortic stenosis, severe  Chronic problem.  Advised patient follow-up with cardiology.   Patient refuses to follow-up with cardiology for further evaluation.  She tells me she is financially unable to afford the appointment.  Highly encouraged patient to follow-up. Will continue to monitor.       Please note that this dictation was created using voice recognition software. I have made every reasonable attempt to correct obvious errors, but I expect that there are errors of grammar and possibly content that I did not discover before finalizing the note.      Return if symptoms worsen or fail to improve.

## 2018-08-08 NOTE — ASSESSMENT & PLAN NOTE
Chronic condition.  Patient refuses to follow-up with cardiology for further evaluation.  She tells me she is financially unable to afford the appointment.  Highly encouraged patient to follow-up.  Will continue to monitor.  She mentions that she is working on diet and exercise.

## 2018-08-08 NOTE — ASSESSMENT & PLAN NOTE
Chronic problem.  Patient states for several years she has been experiencing a constant low-grade aching pain of posterior neck and upper shoulders.  Admits that bilateral posterior shoulders are tender to deep palpation.  She tells me that symptoms are aggravated when she is stressed or tense.  States a massage would improved symptoms but she cannot afford massages.  She also mentions that she uses over-the-counter aspirin and prescribed muscle relaxer at night with moderate alleviation of symptoms.  She denies decreased  strength, muscle atrophy, muscle weakness, decreased range of motion.  Denies numbness/tingling.  Patient is inquiring about following up with a chiropractor.

## 2018-11-09 NOTE — PROGRESS NOTES
CC: Hypertension, aortic stenosis, acid reflux, restless leg syndrome, hyperlipidemia, migraine, muscle cramps    HPI:  Tori presents today to establish a new PCP relationship.    Patient has been active and independent with all ADLs.  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  Has the following medical issues:    Essential hypertension, benign  Blood pressure today is low.  Patient has been on atenolol/chlorthalidone 100--25 mg.She denies lightheadedness but says been feeling tired all the time.    Aortic stenosis, severe  Patient has been asymptomatic.  Denies any dizziness, chest pain, syncopal episodes.  However last echo was done 2015.  Patient cannot afford doing another echo.As per patient she has been following up with cardiology decided not think can be done for now.    Gastroesophageal reflux disease without esophagitis  Patient has been doing fine on omeprazole 20 mg daily.  Currently denies any epigastric pain, heartburn, nausea, and vomiting.    Restless legs syndrome  Patient has been doing fine on gabapentin 600 mg at night.    Dyslipidemia  Patient has history of triglyceridemia, she has been doing fine on gemfibrozil 600 mg twice a day.  Last lipid panel in March 2018 was normal.    Migraine without status migrainosus, not intractable, unspecified migraine type  Patient has been on sumatriptan and has been working very well for her.  She takes it only as needed    Muscle cramps  Patient has neck, lower back and legs grams that has been affecting her quality of life.  Has been on tizanidine, she usually takes it at night.    Pneumonia and flu shot are up-to-date..    Patient Active Problem List    Diagnosis Date Noted   • Neck muscle spasm 08/08/2018   • Dental disorder 04/03/2018   • Low serum potassium 02/09/2018   • GERD (gastroesophageal reflux disease) 02/07/2018   • Arthritis 02/07/2018   • Dyslipidemia 03/04/2016   • Aortic stenosis, severe 03/04/2016   • Headache, migraine  03/04/2016   • Cardiomegaly 01/17/2013   • Essential hypertension, benign 01/17/2013   • Restless legs syndrome 01/17/2013       Current Outpatient Prescriptions   Medication Sig Dispense Refill   • atenolol (TENORMIN) 50 MG Tab Take 1 Tab by mouth every day. 30 Tab 3   • SUMAtriptan (IMITREX) 25 MG Tab tablet TAKE 1 TO 4 TABLETS BY MOUTH ONCE AS NEEDED FOR MIGRAINE FOR UP TO 1 DOSE 9 Tab 2   • tizanidine (ZANAFLEX) 2 MG tablet TAKE TWO TABLETS BY MOUTH TWO TIMES DAILY AS NEEDED 60 Tab 2   • omeprazole (PRILOSEC) 20 MG delayed-release capsule Take 1 Cap by mouth every day. 90 Cap 3   • potassium chloride ER (KLOR-CON) 10 MEQ tablet Take 1 Tab by mouth every day. 90 Tab 3   • gabapentin (NEURONTIN) 600 MG tablet Take 1 Tab by mouth 3 times a day. 270 Tab 3   • gemfibrozil (LOPID) 600 MG Tab Take 1 Tab by mouth 2 times a day. 180 Tab 3   • Cyanocobalamin (VITAMIN B 12 PO) Take  by mouth.     • aspirin EC (ECOTRIN) 81 MG TBEC Take 81 mg by mouth every day.       • Cholecalciferol (VITAMIN D-3 PO) Take 1 Cap by mouth every day.       • Omega-3 Fatty Acids (FISH OIL PO) Take 1 Cap by mouth every day.       • Multiple Vitamin (MULTI-VITAMIN) TABS Take 1 Tab by mouth every day.       • VITAMIN A PO Take 1 Tab by mouth every day.         No current facility-administered medications for this visit.          Allergies as of 11/09/2018   • (No Known Allergies)        Social History     Social History   • Marital status:      Spouse name: N/A   • Number of children: N/A   • Years of education: N/A     Occupational History   • Not on file.     Social History Main Topics   • Smoking status: Passive Smoke Exposure - Never Smoker   • Smokeless tobacco: Never Used   • Alcohol use No   • Drug use: No   • Sexual activity: No      Comment: .      Other Topics Concern   • Not on file     Social History Narrative   • No narrative on file       Family History   Problem Relation Age of Onset   • Adopted: Yes   • Cancer  "Daughter         Cervical CA   • Cancer Daughter         Cervial CA   • Cancer Son         Esophageal Cancer       Past Surgical History:   Procedure Laterality Date   • CATARACT PHACO WITH IOL Left 11/8/2016    Procedure: CATARACT PHACO WITH IOL;  Surgeon: Sam Whatley M.D.;  Location: SURGERY SURG[facility];  Service:    • CATARACT PHACO WITH IOL Right 10/11/2016    Procedure: CATARACT PHACO WITH IOL;  Surgeon: Sam Whatley M.D.;  Location: SURGERY SURGICAL [facility];  Service:    • TUBAL COAGULATION LAPAROSCOPIC BILATERAL  1970       ROS:  Denies any Headache, Blurred Vision, Confusion Chest pain,  Shortness of breath,  Abdominal pain, Changes of bowel or bladder, Lower ext edema, Fevers, Nights sweats, Weight Changes, Focal weakness or numbness.  All other systems are negative.    BP (!) 90/52 (BP Location: Right arm, Patient Position: Sitting, BP Cuff Size: Adult)   Pulse 67   Temp 36.8 °C (98.3 °F) (Temporal)   Ht 1.448 m (4' 9\")   Wt 49 kg (108 lb 0.4 oz)   SpO2 97%   BMI 23.38 kg/m²     Physical Exam:  Gen:         Alert and oriented, No apparent distress.  HEENT:   Perrla, TM clear,  Oralpharynx no erythema or exudates.  Neck:       No Jugular venous distension, Lymphadenopathy, Thyromegaly, Bruits.  Lungs:     Clear to auscultation bilaterally  CV:          Regular rate and rhythm. No murmurs, rubs or gallops.  Abd:         Soft non tender, non distended. Normal active bowel sounds. No                                        Hepatosplenomegaly, No pulsatile masses.  Ext:          No clubbing, cyanosis, edema.      Assessment and Plan.   78 y.o. female     1. Essential hypertension, benign  Blood pressure today is low.  We will stop the atenolol/chlorthalidone 100--25 mg combination and we will put the patient on atenolol 50 mg daily.    - atenolol (TENORMIN) 50 MG Tab; Take 1 Tab by mouth every day.  Dispense: 30 Tab; Refill: 3  - CBC WITH DIFFERENTIAL; Future  - COMP METABOLIC PANEL; " Future  - LIPID PANEL  - TSH; Future    2. Aortic stenosis, severe  Patient has been asymptomatic.  Last echo was done 2015.  Patient cannot afford doing another echo.  Continue follow-up with cardiology (as per patient she has been following up with cardiology decided not think can be done for now.)    3. Gastroesophageal reflux disease without esophagitis  Patient has been doing fine on omeprazole 20 mg daily.    4. Restless legs syndrome  Patient has been doing fine on gabapentin 600 mg at night.    5. Dyslipidemia  Patient has history of triglyceridemia, she has been doing fine on gemfibrozil 600 mg twice a day.    - LIPID PANEL    6. Migraine without status migrainosus, not intractable, unspecified migraine type  She has been doing fine on sumatriptan as needed.    7. Muscle cramps  Patient has neck, lower back and legs grams that has been affecting her quality of life.  Has been on tizanidine, she usually takes it at night.    8. Healthcare maintenance  Pneumonia and flu shot are up-to-date..

## 2018-11-26 NOTE — DISCHARGE INSTRUCTIONS
Lasix 20mg twice daily    Return to the ER for chest pain, difficulty breathing, or other concerns    Call your doctor tomorrow for recheck

## 2018-11-26 NOTE — ED PROVIDER NOTES
ED Provider Note    CHIEF COMPLAINT  Chief Complaint   Patient presents with   • Shortness of Breath       HPI  Tori Guajardo is a 78 y.o. female with a history of dyslipidemia, hypertension, CHF, and aortic stenosis who presents complaining of shortness of breath.    Patient reports her shortness of breath began 3 days ago.  She has had a dry cough and denies fever, chills, nausea, vomiting, diaphoresis, chest pain.  She states she was recently discontinued from her diuretic by her new PCP on 11/9 so that she is now taking only atenolol rather than a combination pill with a diuretic.    Patient denies home O2 therapy or tobacco use    ALLERGIES  No Known Allergies    CURRENT MEDICATIONS  Home Medications     Reviewed by Jona Shaw (Pharmacy Tech) on 11/26/18 at 1207  Med List Status: Complete   Medication Last Dose Status   aspirin EC (ECOTRIN) 81 MG TBEC 11/26/2018 Active   atenolol (TENORMIN) 50 MG Tab 11/26/2018 Active   Cholecalciferol (VITAMIN D-3 PO) 11/26/2018 Active   Cyanocobalamin (VITAMIN B 12 PO) 11/26/2018 Active   gabapentin (NEURONTIN) 600 MG tablet 11/26/2018 Active   gemfibrozil (LOPID) 600 MG Tab 11/26/2018 Active   Multiple Vitamin (MULTI-VITAMIN) TABS 11/26/2018 Active   Omega-3 Fatty Acids (FISH OIL PO) 11/26/2018 Active   omeprazole (PRILOSEC) 20 MG delayed-release capsule > 1 week Active   potassium chloride ER (KLOR-CON) 10 MEQ tablet 11/26/2018 Active   SUMAtriptan (IMITREX) 25 MG Tab tablet > 1 week Active   tizanidine (ZANAFLEX) 2 MG tablet 11/24/2018 Active   VITAMIN A PO 11/26/2018 Active                PAST MEDICAL HISTORY   has a past medical history of Aortic stenosis; Arthritis; Breath shortness; Dental disorder; GERD (gastroesophageal reflux disease); Heart murmur; Hyperlipidemia; Hypertension; Pain (10/7/16); Restless leg syndrome; Stroke (HCC); and Urinary tract infection, site not specified.    SURGICAL HISTORY   has a past surgical history that includes tubal  "coagulation laparoscopic bilateral (1970); cataract phaco with iol (Right, 10/11/2016); and cataract phaco with iol (Left, 11/8/2016).    SOCIAL HISTORY  Social History     Social History Main Topics   • Smoking status: Passive Smoke Exposure - Never Smoker   • Smokeless tobacco: Never Used   • Alcohol use No   • Drug use: No   • Sexual activity: No      Comment: .        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  All other systems are negative except as above in HPI.      PHYSICAL EXAM  VITAL SIGNS: /96   Pulse (!) 102   Temp 37 °C (98.6 °F) (Temporal)   Resp (!) 27   Ht 1.448 m (4' 9\")   Wt 56 kg (123 lb 7.3 oz)   SpO2 99%   BMI 26.72 kg/m²     General:  WDWN, chronically ill-appearing, dyspneic; A+Ox3; V/S as above; tachycardic  Skin: warm and dry; pale color; no rash  HEENT: NCAT; EOMs intact; no scleral icterus   Neck: FROM; no meningismus, JVD noted  Cardiovascular: Tachycardic heart rate and rhythm.IV/VI blowing systolic murmur heard best at aortic region; No rubs, or gallops; pulses 2+ bilaterally radially and DP areas  Lungs: Crackles noted prison up bilaterally  Abdomen: BS present; soft; NTND; no rebound, guarding, or rigidity.  No organomegaly or pulsatile mass  Extremities: GREENWOOD x 4; no e/o trauma; bilateral lower extremity 1+ pitting edema; neg Robert's  Neurologic: CNs III-XII grossly intact; speech clear; distal sensation intact; strength 5/5 UE/LEs  Psychiatric: Appropriate affect, normal mood    LABS  Results for orders placed or performed during the hospital encounter of 11/26/18   CBC w/ Differential   Result Value Ref Range    WBC 8.0 4.8 - 10.8 K/uL    RBC 4.54 4.20 - 5.40 M/uL    Hemoglobin 14.5 12.0 - 16.0 g/dL    Hematocrit 46.5 37.0 - 47.0 %    .4 (H) 81.4 - 97.8 fL    MCH 31.9 27.0 - 33.0 pg    MCHC 31.2 (L) 33.6 - 35.0 g/dL    RDW 53.3 (H) 35.9 - 50.0 fL    Platelet Count 309 164 - " 446 K/uL    MPV 11.0 9.0 - 12.9 fL    Neutrophils-Polys 63.30 44.00 - 72.00 %    Lymphocytes 28.90 22.00 - 41.00 %    Monocytes 6.10 0.00 - 13.40 %    Eosinophils 0.30 0.00 - 6.90 %    Basophils 0.80 0.00 - 1.80 %    Immature Granulocytes 0.60 0.00 - 0.90 %    Nucleated RBC 0.00 /100 WBC    Neutrophils (Absolute) 5.06 2.00 - 7.15 K/uL    Lymphs (Absolute) 2.31 1.00 - 4.80 K/uL    Monos (Absolute) 0.49 0.00 - 0.85 K/uL    Eos (Absolute) 0.02 0.00 - 0.51 K/uL    Baso (Absolute) 0.06 0.00 - 0.12 K/uL    Immature Granulocytes (abs) 0.05 0.00 - 0.11 K/uL    NRBC (Absolute) 0.00 K/uL   Complete Metabolic Panel (CMP)   Result Value Ref Range    Sodium 139 135 - 145 mmol/L    Potassium 3.8 3.6 - 5.5 mmol/L    Chloride 105 96 - 112 mmol/L    Co2 19 (L) 20 - 33 mmol/L    Anion Gap 15.0 (H) 0.0 - 11.9    Glucose 207 (H) 65 - 99 mg/dL    Bun 17 8 - 22 mg/dL    Creatinine 0.82 0.50 - 1.40 mg/dL    Calcium 9.2 8.5 - 10.5 mg/dL    AST(SGOT) 27 12 - 45 U/L    ALT(SGPT) 13 2 - 50 U/L    Alkaline Phosphatase 134 (H) 30 - 99 U/L    Total Bilirubin 0.5 0.1 - 1.5 mg/dL    Albumin 4.4 3.2 - 4.9 g/dL    Total Protein 7.0 6.0 - 8.2 g/dL    Globulin 2.6 1.9 - 3.5 g/dL    A-G Ratio 1.7 g/dL   Btype Natriuretic Peptide   Result Value Ref Range    B Natriuretic Peptide 3118 (H) 0 - 100 pg/mL   Troponin STAT   Result Value Ref Range    Troponin I 0.04 0.00 - 0.04 ng/mL   ESTIMATED GFR   Result Value Ref Range    GFR If African American >60 >60 mL/min/1.73 m 2    GFR If Non African American >60 >60 mL/min/1.73 m 2   EKG (NOW)   Result Value Ref Range    Report       [facility] Emergency Dept.    Test Date:  2018  Pt Name:    GEORGE REBOLLEDO                 Department: ER  MRN:        [id number]                      Room:       [facility]  Gender:     Female                       Technician: [id number]  :        [id number]                   Requested By:ER TRIAGE PROTOCOL  Order #:    [id number]                    Reading MD: SALOME  LOU GUARDADO MD    Measurements  Intervals                                Axis  Rate:       104                          P:          41  KS:         172                          QRS:        -15  QRSD:       86                           T:          142  QT:         340  QTc:        448    Interpretive Statements  SINUS TACHYCARDIA  PROBABLE LVH WITH SECONDARY REPOL ABNRM  t wave inversions in V6  Compared to ECG 10/07/2016 09:48:05  Sinus rhythm no longer present  possible ischemia laterally    Electronically Signed On 11- 11:02:59 PST by SALOME GUARDADO MD         IMAGING  DX-CHEST-PORTABLE (1 VIEW)    (Results Pending)       MEDICAL RECORD  I have reviewed patient's medical record and pertinent results are listed below.      COURSE & MEDICAL DECISION MAKING  I have reviewed any medical record information, laboratory studies and radiographic results as noted.    Tori Guajardo is a 78 y.o. female who presents complaining of shortness of breath.  She was recently discontinued from her diuretic.  I suspect CHF.  Patient has no history of COPD and no risk factors for PE.  Symptoms are not consistent with pneumonia.  Lasix 20 mg IV ordered.      EKG demonstrates sinus tachycardia T wave inversions laterally that are new.    Pt was re-evaluated at 1:06 PM  BNP in 3000s; troponin negative.  CXR still pending  ASA ordered  hospitalist paged    1:30 PM  Discussed with Dr. Potts who agrees to admit the patient    3:58 PM  Patient wants to sign out AGAINST MEDICAL ADVICE.  Since she was advised of her admission, she has declined to stay.  She states she is feeling better and needs to take care of her .  We had case management meet with the patient as well as registration to confirm insurance coverage as finances were also a concern for the patient.  She continues to express the desire to be discharged.  She is aware that she may worsen which may require intubation, ICU admission, or she could die.  She  understands these risks and would like to be discharged.  She was ambulated and maintained an O2 sat in the 90s with a heart rate also in the 90s.  AMA paperwork was completed and the patient signed.  She was advised to take Lasix 20 mg twice daily and to see her regular doctor in the next 24-48 hours for recheck.  She should return to the ER for chest pain, increased shortness of breath, or other concerns.    The total critical care time on this patient is 40 minutes, resuscitating patient, speaking with admitting physician, and deciphering test results. This 40 minutes is exclusive of separately billable procedures.      FINAL IMPRESSION  1. SOB (shortness of breath)        Electronically signed by: Shahnaz Barnett, 11/26/2018 10:49 AM

## 2018-11-26 NOTE — TELEPHONE ENCOUNTER
Patient called c/o having a hard time breathing. Advised her to go to the ER to get evaluated. Patient agreed and said she will head to the ER

## 2018-11-27 NOTE — ED NOTES
Patient ambulated to the restroom on room air.  Oxygen saturations 97%, patient reports her breathing has improved.  Patient signed ama paperwork.  Patient verbalizes understanding of risk of leaving against medical advice.  Patient verbalizes understanding of medications, follow up, and when to return to the ED.

## 2018-12-18 NOTE — TELEPHONE ENCOUNTER
ESTABLISHED PATIENT PRE-VISIT PLANNING     Patient was NOT contacted to complete PVP.     Note: Patient will not be contacted if there is no indication to call.     1.  Reviewed notes from the last few office visits within the medical group: Yes    2.  If any orders were placed at last visit or intended to be done for this visit (i.e. 6 mos follow-up), do we have Results/Consult Notes?        •  Labs - Labs were not ordered at last office visit.   Note: If patient appointment is for lab review and patient did not complete labs, check with provider if OK to reschedule patient until labs completed.       •  Imaging - Imaging was not ordered at last office visit.       •  Referrals - No referrals were ordered at last office visit.    3. Is this appointment scheduled as a Hospital Follow-Up? No    4.  Immunizations were updated in Epic using WebIZ?: Epic matches WebIZ       •  Web Iz Recommendations: MMR  and SHINGRIX (Shingles)    5.  Patient is due for the following Health Maintenance Topics:   Health Maintenance Due   Topic Date Due   • Annual Wellness Visit  1940   • IMM ZOSTER VACCINES (2 of 3) 07/28/2015       - Patient has completed FLU, PNEUMOVAX (PPSV23), PREVNAR (PCV13) , TD, TDAP and SHINGRIX (Shingles) Immunization(s) per WebIZ. Chart has been updated.    6.  MDX printed for Provider? NO    7.  Patient was NOT informed to arrive 15 min prior to their scheduled appointment and bring in their medication bottles.

## 2018-12-19 PROBLEM — I50.9 CHRONIC CONGESTIVE HEART FAILURE (HCC): Status: ACTIVE | Noted: 2018-01-01

## 2018-12-19 NOTE — LETTER
December 19, 2018    To Whom It May Concern:         This is confirmation that Tori Guajardo attended her scheduled appointment with Isadora Martell P.A.-C. on 12/19/18. Please excuse Tori from work on 12/19/18.         If you have any questions please do not hesitate to call me at the phone number listed below.    Sincerely,          Isadora Martell P.A.-C.  [phone]

## 2018-12-23 NOTE — PROGRESS NOTES
Chief Complaint   Patient presents with   • Establish Care     congestion        HISTORY OF PRESENT ILLNESS: Tori Guajardo is an established 78 y.o. female here to discuss the evaluation and management of:      Patient is a pleasant 78-year-old female here today to establish care.  Patient has a positive medical history for severe aortic stenosis, cardiomegaly, essential hypertension, GERD, migraine, low serum potassium, dyslipidemia, restless leg syndrome, neck muscle spasms.  Patient was in the emergency room on 11/29/2018 for shortness of breath.  She tells me she developed symptoms after her diuretic was discontinued by her PCP.  In the emergency room patient was prescribed Lasix 20 mg twice daily atenolol 50 mg once daily.  She tells me she has been compliant with medication until yesterday.  She tells me that she started taking her combination pill yesterday.  Combination pill is atenolol-chlorthalidone 100-25 mg tab once daily.  Patient's blood pressure during today's appointment 108/78 mmHg.  She tells me that she is feeling well.  Denies shortness of breath, dizziness, peripheral edema, vision changes, syncope, chest pain, heart palpitations or severe headache.  Patient refuses to follow-up with cardiology.  Patient refuses echocardiogram.  She tells me that she cannot afford the cost at this time.  She agrees to considering following up after the first the year.    GERD symptoms are managed with omeprazole 20 mg tab once daily.  Denies hoarseness of voice, difficulty swallowing, chronic cough, pain with swallowing, epigastric pain, nausea or vomiting.    For restless leg syndrome patient is prescribed gabapentin 600 mg at night.  She tells me she is compliant with medication and experiences no side effects or complications or medication.  Symptoms are managed with medication.     She has a positive medical history for dyslipidemia and takes gemfibrozil 600 mg twice a day.  Controlled.  Continue to  monitor.  No side effects or medication.  She tells me she is compliant.     Migraine symptoms are managed with Imitrex.  She tells me she is compliant with medication experiences no side effects or comp occasions or medication.  Only takes medication as needed.  Denies red flag symptoms, including: headache in same location, persistent headache, headache worse with bending over or sneezing, headache waking up during sleep. Also denies difficulty with speech, focal weakness, fever, cough, sinus congestion or teeth grinding.    Patient has diffuse muscle cramps.  Predominantly in her neck.  Patient is prescribed Flexeril and takes medication nightly as needed.  Denies side effects or complications or medication.  Denies alcohol abuse.  Denies driving while taking medication.      Patient Active Problem List    Diagnosis Date Noted   • Neck muscle spasm 08/08/2018   • Dental disorder 04/03/2018   • Low serum potassium 02/09/2018   • GERD (gastroesophageal reflux disease) 02/07/2018   • Arthritis 02/07/2018   • Dyslipidemia 03/04/2016   • Aortic stenosis, severe 03/04/2016   • Headache, migraine 03/04/2016   • Cardiomegaly 01/17/2013   • Essential hypertension, benign 01/17/2013   • Restless legs syndrome 01/17/2013       Allergies:Patient has no known allergies.    Current Outpatient Prescriptions   Medication Sig Dispense Refill   • atenolol (TENORMIN) 50 MG Tab Take 1 Tab by mouth every day. 90 Tab 3   • furosemide (LASIX) 20 MG Tab Take 1 Tab by mouth 2 times a day. 180 Tab 3   • potassium chloride ER (KLOR-CON) 10 MEQ tablet Take 1 Tab by mouth every day. 90 Tab 3   • tizanidine (ZANAFLEX) 2 MG tablet Take 2 Tabs by mouth at bedtime as needed (sleep). 90 Tab 2   • omeprazole (PRILOSEC) 20 MG delayed-release capsule Take 20 mg by mouth 1 time daily as needed.     • SUMAtriptan (IMITREX) 25 MG Tab tablet TAKE 1 TO 4 TABLETS BY MOUTH ONCE AS NEEDED FOR MIGRAINE FOR UP TO 1 DOSE 9 Tab 2   • gabapentin (NEURONTIN) 600  MG tablet Take 1 Tab by mouth 3 times a day. 270 Tab 3   • gemfibrozil (LOPID) 600 MG Tab Take 1 Tab by mouth 2 times a day. 180 Tab 3   • Cyanocobalamin (VITAMIN B 12 PO) Take 1 Tab by mouth every morning.     • aspirin EC (ECOTRIN) 81 MG TBEC Take 81 mg by mouth every day.       • Cholecalciferol (VITAMIN D-3 PO) Take 1 Cap by mouth every day.       • Omega-3 Fatty Acids (FISH OIL PO) Take 1 Cap by mouth every day.       • Multiple Vitamin (MULTI-VITAMIN) TABS Take 1 Tab by mouth every day.       • VITAMIN A PO Take 1 Tab by mouth every day.         No current facility-administered medications for this visit.        Social History   Substance Use Topics   • Smoking status: Passive Smoke Exposure - Never Smoker   • Smokeless tobacco: Never Used   • Alcohol use No       Family Status   Relation Status   • Mo Other   • Fa Other   • Bro    • Wendy Alive   • Wendy Alive   • Wendy Alive   • Wendy Alive   • Son Alive   • Son Alive   • Son      Family History   Problem Relation Age of Onset   • Adopted: Yes   • Cancer Daughter         Cervical CA   • Cancer Daughter         Cervial CA   • Cancer Son         Esophageal Cancer       ROS:  Review of Systems   Constitutional: Negative for fever, chills, weight loss and malaise/fatigue.   HENT: Negative for ear pain, nosebleeds, congestion, sore throat and neck pain.    Eyes: Negative for blurred vision.   Respiratory: Negative for cough, sputum production, shortness of breath and wheezing.    Cardiovascular: Negative for chest pain, palpitations, orthopnea and leg swelling.   Gastrointestinal: Negative for heartburn, nausea, vomiting and abdominal pain.   Genitourinary: Negative for dysuria, urgency and frequency.   Musculoskeletal: + for myalgias, back pain and joint pain.   Skin: Negative for rash and itching.   Neurological: Negative for dizziness, tingling, tremors, sensory change, focal weakness and headaches.   Endo/Heme/Allergies: Does not bruise/bleed easily.  "  Psychiatric/Behavioral: Negative for depression, suicidal ideas and memory loss.  The patient is not nervous/anxious and does not have insomnia.    All other systems reviewed and are negative except as in HPI.    Exam: Blood pressure 108/78, pulse 67, temperature 36.3 °C (97.4 °F), resp. rate 16, height 1.448 m (4' 9\"), weight 49 kg (108 lb), SpO2 98 %, not currently breastfeeding. Body mass index is 23.37 kg/m².  General: Normal appearing. No distress.  HEENT: Normocephalic. Eyes conjunctiva clear lids without ptosis, pupils equal and reactive to light accommodation, ears normal shape and contour, canals are clear bilaterally, tympanic membranes are benign, nasal mucosa benign, oropharynx is without erythema, edema or exudates.  Mucous membranes are moist.  Neck: Supple without JVD or bruit. Thyroid is not enlarged.  Pulmonary: Clear to ausculation.  Normal effort. No rales, ronchi, or wheezing.  Cardiovascular: Regular rate and rhythm.  Positive for severe aortic stenosis  Abdomen: Soft, nontender, nondistended. Normal bowel sounds. Liver and spleen are not palpable  Neurologic: Grossly nonfocal.  Cranial nerves are normal.   Lymph: No cervical, supraclavicular or axillary lymph nodes are palpable  Skin: Warm and dry.  No rashes or suspicious skin lesions.  Musculoskeletal: Normal gait. No extremity cyanosis, clubbing, or edema.  Psych: Normal mood and affect. Alert and oriented x3. Judgment and insight is normal.    Medical decision-making and discussion:  1. Aortic stenosis, severe  2. Cardiomegaly  3. Essential hypertension, benign  During today's appointment advised patient to discontinue atenolol-chlorthalidone.  Advised patient to take atenolol 50 mg tab once daily and Lasix 20 mg tab twice daily.  Advised patient to take potassium chloride 10 mEq once daily.  Discussed common side effects and adverse reactions of medication with patient.  Highly emphasized the importance of following up with cardiology.  " Patient refuses.  Patient refuses an echocardiogram.  She tells me that she cannot afford the cost.  She agrees to considering following up after the first the year.  Will monitor closely.  Discussed ED precautions in great detail with patient.  Discussed heart failure in great detail with patient.  Patient verbally consents that she understands.     Lab work has been ordered to further evaluate patient.  Patient will be contacted with results.    - atenolol (TENORMIN) 50 MG Tab; Take 1 Tab by mouth every day.  Dispense: 90 Tab; Refill: 3  - COMP METABOLIC PANEL; Future  - Lipid Profile; Future  - CBC WITH DIFFERENTIAL; Future  - TSH WITH REFLEX TO FT4; Future  - furosemide (LASIX) 20 MG Tab; Take 1 Tab by mouth 2 times a day.  Dispense: 180 Tab; Refill: 3  - potassium chloride ER (KLOR-CON) 10 MEQ tablet; Take 1 Tab by mouth every day.  Dispense: 90 Tab; Refill: 3      4. Gastroesophageal reflux disease without esophagitis  This is a chronic and stable problem. Patient is doing well. No red flags. Continue PPI and monitor.      5. Migraine without status migrainosus, not intractable, unspecified migraine type  No red flags.  Continue current medication regimen.  Continue to monitor.  Avoid headache triggers.  Emphasized the benefits of healthy diet, regular exercise routine, and practicing good sleep hygiene.    6. Low serum potassium  Continue to monitor.  Continue taking potassium chloride as prescribed.    7. Neck muscle spasm  Chronic but stable problem.  Continue to monitor.  Continue current medication regimen.  Advised patient continue working on stretching, staying hydrated and staying active.  Suggested a massage.    - tizanidine (ZANAFLEX) 2 MG tablet; Take 2 Tabs by mouth at bedtime as needed (sleep).  Dispense: 90 Tab; Refill: 2    8. Dyslipidemia  Well controlled. Labs as indicated. Continue gemfibrozil medication. Continue to monitor.    - Lipid Profile; Future    9. Restless legs syndrome  Chronic  but stable problem.  Continue current medication regimen.  Continue to monitor.      Please note that this dictation was created using voice recognition software. I have made every reasonable attempt to correct obvious errors, but I expect that there are errors of grammar and possibly content that I did not discover before finalizing the note.      Return in about 3 months (around 3/19/2019).

## 2019-01-01 ENCOUNTER — APPOINTMENT (OUTPATIENT)
Dept: RADIOLOGY | Facility: MEDICAL CENTER | Age: 79
DRG: 291 | End: 2019-01-01
Attending: HOSPITALIST
Payer: MEDICARE

## 2019-01-01 ENCOUNTER — APPOINTMENT (OUTPATIENT)
Dept: RADIOLOGY | Facility: MEDICAL CENTER | Age: 79
DRG: 291 | End: 2019-01-01
Attending: EMERGENCY MEDICINE
Payer: MEDICARE

## 2019-01-01 ENCOUNTER — HOME CARE VISIT (OUTPATIENT)
Dept: HOSPICE | Facility: HOSPICE | Age: 79
End: 2019-01-01
Payer: MEDICARE

## 2019-01-01 ENCOUNTER — APPOINTMENT (OUTPATIENT)
Dept: CARDIOLOGY | Facility: MEDICAL CENTER | Age: 79
DRG: 291 | End: 2019-01-01
Attending: HOSPITALIST
Payer: MEDICARE

## 2019-01-01 ENCOUNTER — HOSPICE ADMISSION (OUTPATIENT)
Dept: HOSPICE | Facility: HOSPICE | Age: 79
End: 2019-01-01
Payer: MEDICARE

## 2019-01-01 ENCOUNTER — APPOINTMENT (OUTPATIENT)
Dept: RADIOLOGY | Facility: MEDICAL CENTER | Age: 79
DRG: 291 | End: 2019-01-01
Attending: INTERNAL MEDICINE
Payer: MEDICARE

## 2019-01-01 ENCOUNTER — HOSPITAL ENCOUNTER (INPATIENT)
Facility: MEDICAL CENTER | Age: 79
LOS: 6 days | DRG: 291 | End: 2019-03-03
Attending: EMERGENCY MEDICINE | Admitting: HOSPITALIST
Payer: MEDICARE

## 2019-01-01 VITALS
HEIGHT: 60 IN | BODY MASS INDEX: 19.61 KG/M2 | RESPIRATION RATE: 14 BRPM | OXYGEN SATURATION: 95 % | HEART RATE: 129 BPM | WEIGHT: 99.87 LBS | DIASTOLIC BLOOD PRESSURE: 94 MMHG | SYSTOLIC BLOOD PRESSURE: 94 MMHG | TEMPERATURE: 97.2 F

## 2019-01-01 DIAGNOSIS — R11.15 NON-INTRACTABLE CYCLICAL VOMITING WITH NAUSEA: ICD-10-CM

## 2019-01-01 DIAGNOSIS — Z66 DNR (DO NOT RESUSCITATE): ICD-10-CM

## 2019-01-01 DIAGNOSIS — G43.909 MIGRAINE WITHOUT STATUS MIGRAINOSUS, NOT INTRACTABLE, UNSPECIFIED MIGRAINE TYPE: ICD-10-CM

## 2019-01-01 DIAGNOSIS — I35.0 AORTIC STENOSIS, SEVERE: ICD-10-CM

## 2019-01-01 DIAGNOSIS — I50.43 ACUTE ON CHRONIC COMBINED SYSTOLIC AND DIASTOLIC CONGESTIVE HEART FAILURE (HCC): ICD-10-CM

## 2019-01-01 LAB
ALBUMIN SERPL BCP-MCNC: 4.3 G/DL (ref 3.2–4.9)
ALBUMIN/GLOB SERPL: 1.6 G/DL
ALP SERPL-CCNC: 121 U/L (ref 30–99)
ALT SERPL-CCNC: 34 U/L (ref 2–50)
ANION GAP SERPL CALC-SCNC: 14 MMOL/L (ref 0–11.9)
ANION GAP SERPL CALC-SCNC: 16 MMOL/L (ref 0–11.9)
ANION GAP SERPL CALC-SCNC: 16 MMOL/L (ref 0–11.9)
ANION GAP SERPL CALC-SCNC: 19 MMOL/L (ref 0–11.9)
APTT PPP: 30.4 SEC (ref 24.7–36)
AST SERPL-CCNC: 55 U/L (ref 12–45)
BASE EXCESS BLDA CALC-SCNC: -3 MMOL/L (ref -4–3)
BASOPHILS # BLD AUTO: 0.1 % (ref 0–1.8)
BASOPHILS # BLD AUTO: 0.2 % (ref 0–1.8)
BASOPHILS # BLD: 0.01 K/UL (ref 0–0.12)
BASOPHILS # BLD: 0.02 K/UL (ref 0–0.12)
BILIRUB SERPL-MCNC: 1.1 MG/DL (ref 0.1–1.5)
BNP SERPL-MCNC: >5000 PG/ML (ref 0–100)
BODY TEMPERATURE: 34 CENTIGRADE
BUN SERPL-MCNC: 30 MG/DL (ref 8–22)
BUN SERPL-MCNC: 40 MG/DL (ref 8–22)
BUN SERPL-MCNC: 41 MG/DL (ref 8–22)
BUN SERPL-MCNC: 42 MG/DL (ref 8–22)
CALCIUM SERPL-MCNC: 10.7 MG/DL (ref 8.5–10.5)
CALCIUM SERPL-MCNC: 9.3 MG/DL (ref 8.5–10.5)
CALCIUM SERPL-MCNC: 9.4 MG/DL (ref 8.5–10.5)
CALCIUM SERPL-MCNC: 9.9 MG/DL (ref 8.5–10.5)
CHLORIDE SERPL-SCNC: 101 MMOL/L (ref 96–112)
CHLORIDE SERPL-SCNC: 101 MMOL/L (ref 96–112)
CHLORIDE SERPL-SCNC: 102 MMOL/L (ref 96–112)
CHLORIDE SERPL-SCNC: 104 MMOL/L (ref 96–112)
CO2 SERPL-SCNC: 21 MMOL/L (ref 20–33)
CO2 SERPL-SCNC: 21 MMOL/L (ref 20–33)
CO2 SERPL-SCNC: 22 MMOL/L (ref 20–33)
CO2 SERPL-SCNC: 22 MMOL/L (ref 20–33)
CREAT SERPL-MCNC: 0.78 MG/DL (ref 0.5–1.4)
CREAT SERPL-MCNC: 0.85 MG/DL (ref 0.5–1.4)
CREAT SERPL-MCNC: 1.1 MG/DL (ref 0.5–1.4)
CREAT SERPL-MCNC: 1.17 MG/DL (ref 0.5–1.4)
EKG IMPRESSION: NORMAL
EKG IMPRESSION: NORMAL
EOSINOPHIL # BLD AUTO: 0 K/UL (ref 0–0.51)
EOSINOPHIL NFR BLD: 0 % (ref 0–6.9)
ERYTHROCYTE [DISTWIDTH] IN BLOOD BY AUTOMATED COUNT: 58.4 FL (ref 35.9–50)
ERYTHROCYTE [DISTWIDTH] IN BLOOD BY AUTOMATED COUNT: 58.8 FL (ref 35.9–50)
ERYTHROCYTE [DISTWIDTH] IN BLOOD BY AUTOMATED COUNT: 60.6 FL (ref 35.9–50)
ERYTHROCYTE [DISTWIDTH] IN BLOOD BY AUTOMATED COUNT: 61.1 FL (ref 35.9–50)
GAMMA INTERFERON BACKGROUND BLD IA-ACNC: 0.03 IU/ML
GLOBULIN SER CALC-MCNC: 2.7 G/DL (ref 1.9–3.5)
GLUCOSE SERPL-MCNC: 121 MG/DL (ref 65–99)
GLUCOSE SERPL-MCNC: 141 MG/DL (ref 65–99)
GLUCOSE SERPL-MCNC: 186 MG/DL (ref 65–99)
GLUCOSE SERPL-MCNC: 96 MG/DL (ref 65–99)
HCO3 BLDA-SCNC: 21 MMOL/L (ref 17–25)
HCT VFR BLD AUTO: 50.1 % (ref 37–47)
HCT VFR BLD AUTO: 50.8 % (ref 37–47)
HCT VFR BLD AUTO: 52 % (ref 37–47)
HCT VFR BLD AUTO: 53.5 % (ref 37–47)
HGB BLD-MCNC: 15.8 G/DL (ref 12–16)
HGB BLD-MCNC: 15.9 G/DL (ref 12–16)
HGB BLD-MCNC: 16.4 G/DL (ref 12–16)
HGB BLD-MCNC: 16.5 G/DL (ref 12–16)
IMM GRANULOCYTES # BLD AUTO: 0.05 K/UL (ref 0–0.11)
IMM GRANULOCYTES # BLD AUTO: 0.07 K/UL (ref 0–0.11)
IMM GRANULOCYTES # BLD AUTO: 0.07 K/UL (ref 0–0.11)
IMM GRANULOCYTES # BLD AUTO: 0.08 K/UL (ref 0–0.11)
IMM GRANULOCYTES NFR BLD AUTO: 0.5 % (ref 0–0.9)
IMM GRANULOCYTES NFR BLD AUTO: 0.8 % (ref 0–0.9)
IMM GRANULOCYTES NFR BLD AUTO: 0.8 % (ref 0–0.9)
IMM GRANULOCYTES NFR BLD AUTO: 0.9 % (ref 0–0.9)
INHALED O2 FLOW RATE: 2 L/MIN (ref 2–10)
INR PPP: 2.29 (ref 0.87–1.13)
LV EJECT FRACT  99904: 25
LV EJECT FRACT MOD 2C 99903: 28.29
LV EJECT FRACT MOD 4C 99902: 32.67
LV EJECT FRACT MOD BP 99901: 32.98
LYMPHOCYTES # BLD AUTO: 0.25 K/UL (ref 1–4.8)
LYMPHOCYTES # BLD AUTO: 0.33 K/UL (ref 1–4.8)
LYMPHOCYTES # BLD AUTO: 0.53 K/UL (ref 1–4.8)
LYMPHOCYTES # BLD AUTO: 0.99 K/UL (ref 1–4.8)
LYMPHOCYTES NFR BLD: 2.8 % (ref 22–41)
LYMPHOCYTES NFR BLD: 3.5 % (ref 22–41)
LYMPHOCYTES NFR BLD: 6.5 % (ref 22–41)
LYMPHOCYTES NFR BLD: 9.3 % (ref 22–41)
M TB IFN-G BLD-IMP: NEGATIVE
M TB IFN-G CD4+ BCKGRND COR BLD-ACNC: 0 IU/ML
MCH RBC QN AUTO: 31 PG (ref 27–33)
MCH RBC QN AUTO: 31.9 PG (ref 27–33)
MCH RBC QN AUTO: 32 PG (ref 27–33)
MCH RBC QN AUTO: 32 PG (ref 27–33)
MCHC RBC AUTO-ENTMCNC: 30.8 G/DL (ref 33.6–35)
MCHC RBC AUTO-ENTMCNC: 31.3 G/DL (ref 33.6–35)
MCHC RBC AUTO-ENTMCNC: 31.5 G/DL (ref 33.6–35)
MCHC RBC AUTO-ENTMCNC: 31.5 G/DL (ref 33.6–35)
MCV RBC AUTO: 100.4 FL (ref 81.4–97.8)
MCV RBC AUTO: 101.2 FL (ref 81.4–97.8)
MCV RBC AUTO: 101.4 FL (ref 81.4–97.8)
MCV RBC AUTO: 102.2 FL (ref 81.4–97.8)
MITOGEN IGNF BCKGRD COR BLD-ACNC: 1.13 IU/ML
MONOCYTES # BLD AUTO: 0.37 K/UL (ref 0–0.85)
MONOCYTES # BLD AUTO: 0.45 K/UL (ref 0–0.85)
MONOCYTES # BLD AUTO: 0.46 K/UL (ref 0–0.85)
MONOCYTES # BLD AUTO: 1.15 K/UL (ref 0–0.85)
MONOCYTES NFR BLD AUTO: 10.8 % (ref 0–13.4)
MONOCYTES NFR BLD AUTO: 4.6 % (ref 0–13.4)
MONOCYTES NFR BLD AUTO: 4.8 % (ref 0–13.4)
MONOCYTES NFR BLD AUTO: 5.2 % (ref 0–13.4)
NEUTROPHILS # BLD AUTO: 7.13 K/UL (ref 2–7.15)
NEUTROPHILS # BLD AUTO: 8.02 K/UL (ref 2–7.15)
NEUTROPHILS # BLD AUTO: 8.39 K/UL (ref 2–7.15)
NEUTROPHILS # BLD AUTO: 8.47 K/UL (ref 2–7.15)
NEUTROPHILS NFR BLD: 78.9 % (ref 44–72)
NEUTROPHILS NFR BLD: 87.8 % (ref 44–72)
NEUTROPHILS NFR BLD: 91 % (ref 44–72)
NEUTROPHILS NFR BLD: 91.1 % (ref 44–72)
NRBC # BLD AUTO: 0 K/UL
NRBC # BLD AUTO: 0.03 K/UL
NRBC BLD-RTO: 0 /100 WBC
NRBC BLD-RTO: 0.3 /100 WBC
PCO2 BLDA: 35.2 MMHG (ref 26–37)
PCO2 TEMP ADJ BLDA: 30.9 MMHG (ref 26–37)
PH BLDA: 7.4 [PH] (ref 7.4–7.5)
PH TEMP ADJ BLDA: 7.44 [PH] (ref 7.4–7.5)
PLATELET # BLD AUTO: 174 K/UL (ref 164–446)
PLATELET # BLD AUTO: 176 K/UL (ref 164–446)
PLATELET # BLD AUTO: 217 K/UL (ref 164–446)
PLATELET # BLD AUTO: 252 K/UL (ref 164–446)
PMV BLD AUTO: 10.9 FL (ref 9–12.9)
PMV BLD AUTO: 11.2 FL (ref 9–12.9)
PMV BLD AUTO: 11.6 FL (ref 9–12.9)
PMV BLD AUTO: 11.9 FL (ref 9–12.9)
PO2 BLDA: 79 MMHG (ref 64–87)
PO2 TEMP ADJ BLDA: 64.8 MMHG (ref 64–87)
POTASSIUM SERPL-SCNC: 3.9 MMOL/L (ref 3.6–5.5)
POTASSIUM SERPL-SCNC: 4.1 MMOL/L (ref 3.6–5.5)
POTASSIUM SERPL-SCNC: 4.5 MMOL/L (ref 3.6–5.5)
POTASSIUM SERPL-SCNC: 4.6 MMOL/L (ref 3.6–5.5)
PROT SERPL-MCNC: 7 G/DL (ref 6–8.2)
PROTHROMBIN TIME: 25.3 SEC (ref 12–14.6)
QFT TB2 - NIL TBQ2: 0 IU/ML
RBC # BLD AUTO: 4.95 M/UL (ref 4.2–5.4)
RBC # BLD AUTO: 4.97 M/UL (ref 4.2–5.4)
RBC # BLD AUTO: 5.13 M/UL (ref 4.2–5.4)
RBC # BLD AUTO: 5.33 M/UL (ref 4.2–5.4)
SAO2 % BLDA: 94.9 % (ref 93–99)
SODIUM SERPL-SCNC: 137 MMOL/L (ref 135–145)
SODIUM SERPL-SCNC: 139 MMOL/L (ref 135–145)
SODIUM SERPL-SCNC: 141 MMOL/L (ref 135–145)
SODIUM SERPL-SCNC: 142 MMOL/L (ref 135–145)
WBC # BLD AUTO: 10.6 K/UL (ref 4.8–10.8)
WBC # BLD AUTO: 8.1 K/UL (ref 4.8–10.8)
WBC # BLD AUTO: 8.8 K/UL (ref 4.8–10.8)
WBC # BLD AUTO: 9.3 K/UL (ref 4.8–10.8)

## 2019-01-01 PROCEDURE — A9270 NON-COVERED ITEM OR SERVICE: HCPCS | Performed by: HOSPITALIST

## 2019-01-01 PROCEDURE — 83880 ASSAY OF NATRIURETIC PEPTIDE: CPT

## 2019-01-01 PROCEDURE — 700105 HCHG RX REV CODE 258: Performed by: HOSPITALIST

## 2019-01-01 PROCEDURE — 85730 THROMBOPLASTIN TIME PARTIAL: CPT

## 2019-01-01 PROCEDURE — 80048 BASIC METABOLIC PNL TOTAL CA: CPT

## 2019-01-01 PROCEDURE — 99233 SBSQ HOSP IP/OBS HIGH 50: CPT | Performed by: HOSPITALIST

## 2019-01-01 PROCEDURE — 700101 HCHG RX REV CODE 250: Performed by: HOSPITALIST

## 2019-01-01 PROCEDURE — 36415 COLL VENOUS BLD VENIPUNCTURE: CPT

## 2019-01-01 PROCEDURE — 74176 CT ABD & PELVIS W/O CONTRAST: CPT

## 2019-01-01 PROCEDURE — 99232 SBSQ HOSP IP/OBS MODERATE 35: CPT | Performed by: HOSPITALIST

## 2019-01-01 PROCEDURE — 700102 HCHG RX REV CODE 250 W/ 637 OVERRIDE(OP): Performed by: INTERNAL MEDICINE

## 2019-01-01 PROCEDURE — 85025 COMPLETE CBC W/AUTO DIFF WBC: CPT

## 2019-01-01 PROCEDURE — 93306 TTE W/DOPPLER COMPLETE: CPT

## 2019-01-01 PROCEDURE — 86480 TB TEST CELL IMMUN MEASURE: CPT

## 2019-01-01 PROCEDURE — 770020 HCHG ROOM/CARE - TELE (206)

## 2019-01-01 PROCEDURE — 700102 HCHG RX REV CODE 250 W/ 637 OVERRIDE(OP): Performed by: HOSPITALIST

## 2019-01-01 PROCEDURE — 99233 SBSQ HOSP IP/OBS HIGH 50: CPT | Performed by: INTERNAL MEDICINE

## 2019-01-01 PROCEDURE — 700105 HCHG RX REV CODE 258: Performed by: EMERGENCY MEDICINE

## 2019-01-01 PROCEDURE — 85610 PROTHROMBIN TIME: CPT

## 2019-01-01 PROCEDURE — A9270 NON-COVERED ITEM OR SERVICE: HCPCS | Performed by: INTERNAL MEDICINE

## 2019-01-01 PROCEDURE — 99285 EMERGENCY DEPT VISIT HI MDM: CPT

## 2019-01-01 PROCEDURE — 93005 ELECTROCARDIOGRAM TRACING: CPT | Performed by: EMERGENCY MEDICINE

## 2019-01-01 PROCEDURE — 71045 X-RAY EXAM CHEST 1 VIEW: CPT

## 2019-01-01 PROCEDURE — 700111 HCHG RX REV CODE 636 W/ 250 OVERRIDE (IP): Performed by: HOSPITALIST

## 2019-01-01 PROCEDURE — 51798 US URINE CAPACITY MEASURE: CPT

## 2019-01-01 PROCEDURE — 80053 COMPREHEN METABOLIC PANEL: CPT

## 2019-01-01 PROCEDURE — 97162 PT EVAL MOD COMPLEX 30 MIN: CPT

## 2019-01-01 PROCEDURE — 99220 PR INITIAL OBSERVATION CARE,LEVL III: CPT | Performed by: HOSPITALIST

## 2019-01-01 PROCEDURE — G0378 HOSPITAL OBSERVATION PER HR: HCPCS

## 2019-01-01 PROCEDURE — 97166 OT EVAL MOD COMPLEX 45 MIN: CPT

## 2019-01-01 PROCEDURE — 96374 THER/PROPH/DIAG INJ IV PUSH: CPT

## 2019-01-01 PROCEDURE — 93005 ELECTROCARDIOGRAM TRACING: CPT | Performed by: HOSPITALIST

## 2019-01-01 PROCEDURE — 82803 BLOOD GASES ANY COMBINATION: CPT

## 2019-01-01 PROCEDURE — 74018 RADEX ABDOMEN 1 VIEW: CPT

## 2019-01-01 PROCEDURE — 99291 CRITICAL CARE FIRST HOUR: CPT | Performed by: HOSPITALIST

## 2019-01-01 PROCEDURE — 93010 ELECTROCARDIOGRAM REPORT: CPT | Performed by: INTERNAL MEDICINE

## 2019-01-01 PROCEDURE — 700111 HCHG RX REV CODE 636 W/ 250 OVERRIDE (IP): Performed by: INTERNAL MEDICINE

## 2019-01-01 PROCEDURE — 99356 PR PROLONGED SVC I/P OR OBS SETTING 1ST HOUR: CPT | Performed by: HOSPITALIST

## 2019-01-01 PROCEDURE — 99222 1ST HOSP IP/OBS MODERATE 55: CPT | Performed by: INTERNAL MEDICINE

## 2019-01-01 PROCEDURE — 99497 ADVNCD CARE PLAN 30 MIN: CPT | Performed by: INTERNAL MEDICINE

## 2019-01-01 RX ORDER — OMEPRAZOLE 20 MG/1
20 CAPSULE, DELAYED RELEASE ORAL DAILY
Status: DISCONTINUED | OUTPATIENT
Start: 2019-01-01 | End: 2019-01-01

## 2019-01-01 RX ORDER — DILTIAZEM HYDROCHLORIDE 5 MG/ML
10 INJECTION INTRAVENOUS ONCE
Status: COMPLETED | OUTPATIENT
Start: 2019-01-01 | End: 2019-01-01

## 2019-01-01 RX ORDER — MORPHINE SULFATE 10 MG/ML
10 INJECTION, SOLUTION INTRAMUSCULAR; INTRAVENOUS
Status: DISCONTINUED | OUTPATIENT
Start: 2019-01-01 | End: 2019-01-01

## 2019-01-01 RX ORDER — POLYVINYL ALCOHOL 14 MG/ML
2 SOLUTION/ DROPS OPHTHALMIC EVERY 6 HOURS PRN
Status: DISCONTINUED | OUTPATIENT
Start: 2019-01-01 | End: 2019-01-01 | Stop reason: HOSPADM

## 2019-01-01 RX ORDER — NITROGLYCERIN 0.4 MG/1
0.4 TABLET SUBLINGUAL ONCE
Status: DISCONTINUED | OUTPATIENT
Start: 2019-01-01 | End: 2019-01-01

## 2019-01-01 RX ORDER — ONDANSETRON 2 MG/ML
8 INJECTION INTRAMUSCULAR; INTRAVENOUS EVERY 8 HOURS PRN
Status: DISCONTINUED | OUTPATIENT
Start: 2019-01-01 | End: 2019-01-01 | Stop reason: HOSPADM

## 2019-01-01 RX ORDER — POLYETHYLENE GLYCOL 3350 17 G/17G
1 POWDER, FOR SOLUTION ORAL
Status: DISCONTINUED | OUTPATIENT
Start: 2019-01-01 | End: 2019-01-01

## 2019-01-01 RX ORDER — ACETAMINOPHEN 325 MG/1
650 TABLET ORAL EVERY 6 HOURS PRN
Status: DISCONTINUED | OUTPATIENT
Start: 2019-01-01 | End: 2019-01-01

## 2019-01-01 RX ORDER — SODIUM CHLORIDE 9 MG/ML
INJECTION, SOLUTION INTRAVENOUS
Status: ACTIVE
Start: 2019-01-01 | End: 2019-01-01

## 2019-01-01 RX ORDER — ATENOLOL 50 MG/1
12.5 TABLET ORAL DAILY
Status: DISCONTINUED | OUTPATIENT
Start: 2019-01-01 | End: 2019-01-01

## 2019-01-01 RX ORDER — TIZANIDINE 4 MG/1
4 TABLET ORAL
Status: DISCONTINUED | OUTPATIENT
Start: 2019-01-01 | End: 2019-01-01

## 2019-01-01 RX ORDER — FLUMAZENIL 0.1 MG/ML
INJECTION INTRAVENOUS
Status: ACTIVE
Start: 2019-01-01 | End: 2019-01-01

## 2019-01-01 RX ORDER — HALOPERIDOL 2 MG/ML
1 SOLUTION ORAL EVERY 6 HOURS PRN
Status: DISCONTINUED | OUTPATIENT
Start: 2019-01-01 | End: 2019-01-01 | Stop reason: HOSPADM

## 2019-01-01 RX ORDER — ATENOLOL 50 MG/1
25 TABLET ORAL ONCE
Status: DISCONTINUED | OUTPATIENT
Start: 2019-01-01 | End: 2019-01-01

## 2019-01-01 RX ORDER — ATENOLOL 50 MG/1
50 TABLET ORAL DAILY
Status: DISCONTINUED | OUTPATIENT
Start: 2019-01-01 | End: 2019-01-01

## 2019-01-01 RX ORDER — ONDANSETRON 4 MG/1
4 TABLET, ORALLY DISINTEGRATING ORAL EVERY 4 HOURS PRN
Status: DISCONTINUED | OUTPATIENT
Start: 2019-01-01 | End: 2019-01-01

## 2019-01-01 RX ORDER — FUROSEMIDE 10 MG/ML
10 INJECTION INTRAMUSCULAR; INTRAVENOUS ONCE
Status: COMPLETED | OUTPATIENT
Start: 2019-01-01 | End: 2019-01-01

## 2019-01-01 RX ORDER — ENALAPRILAT 1.25 MG/ML
1.25 INJECTION INTRAVENOUS ONCE
Status: COMPLETED | OUTPATIENT
Start: 2019-01-01 | End: 2019-01-01

## 2019-01-01 RX ORDER — GEMFIBROZIL 600 MG/1
600 TABLET, FILM COATED ORAL 2 TIMES DAILY
Status: DISCONTINUED | OUTPATIENT
Start: 2019-01-01 | End: 2019-01-01

## 2019-01-01 RX ORDER — ATENOLOL 50 MG/1
25 TABLET ORAL DAILY
Status: DISCONTINUED | OUTPATIENT
Start: 2019-01-01 | End: 2019-01-01

## 2019-01-01 RX ORDER — GABAPENTIN 300 MG/1
600 CAPSULE ORAL 3 TIMES DAILY
Status: DISCONTINUED | OUTPATIENT
Start: 2019-01-01 | End: 2019-01-01

## 2019-01-01 RX ORDER — FUROSEMIDE 10 MG/ML
20 INJECTION INTRAMUSCULAR; INTRAVENOUS ONCE
Status: COMPLETED | OUTPATIENT
Start: 2019-01-01 | End: 2019-01-01

## 2019-01-01 RX ORDER — BISACODYL 10 MG
10 SUPPOSITORY, RECTAL RECTAL
Status: DISCONTINUED | OUTPATIENT
Start: 2019-01-01 | End: 2019-01-01

## 2019-01-01 RX ORDER — FUROSEMIDE 10 MG/ML
10 INJECTION INTRAMUSCULAR; INTRAVENOUS 3 TIMES DAILY
Status: DISCONTINUED | OUTPATIENT
Start: 2019-01-01 | End: 2019-01-01

## 2019-01-01 RX ORDER — ONDANSETRON 4 MG/1
8 TABLET, ORALLY DISINTEGRATING ORAL EVERY 8 HOURS PRN
Status: DISCONTINUED | OUTPATIENT
Start: 2019-01-01 | End: 2019-01-01 | Stop reason: HOSPADM

## 2019-01-01 RX ORDER — ONDANSETRON 2 MG/ML
4 INJECTION INTRAMUSCULAR; INTRAVENOUS ONCE
Status: DISCONTINUED | OUTPATIENT
Start: 2019-01-01 | End: 2019-01-01

## 2019-01-01 RX ORDER — AMOXICILLIN 250 MG
2 CAPSULE ORAL 2 TIMES DAILY
Status: DISCONTINUED | OUTPATIENT
Start: 2019-01-01 | End: 2019-01-01

## 2019-01-01 RX ORDER — SUMATRIPTAN 25 MG/1
TABLET, FILM COATED ORAL
Qty: 9 TAB | Refills: 2 | Status: SHIPPED | OUTPATIENT
Start: 2019-01-01

## 2019-01-01 RX ORDER — SCOLOPAMINE TRANSDERMAL SYSTEM 1 MG/1
1 PATCH, EXTENDED RELEASE TRANSDERMAL
Status: DISCONTINUED | OUTPATIENT
Start: 2019-01-01 | End: 2019-01-01 | Stop reason: HOSPADM

## 2019-01-01 RX ORDER — SODIUM CHLORIDE 9 MG/ML
500 INJECTION, SOLUTION INTRAVENOUS ONCE
Status: DISCONTINUED | OUTPATIENT
Start: 2019-01-01 | End: 2019-01-01

## 2019-01-01 RX ORDER — LORAZEPAM 2 MG/ML
0.5 INJECTION INTRAMUSCULAR ONCE
Status: COMPLETED | OUTPATIENT
Start: 2019-01-01 | End: 2019-01-01

## 2019-01-01 RX ORDER — ACETAMINOPHEN 325 MG/1
650 TABLET ORAL EVERY 6 HOURS
Status: DISCONTINUED | OUTPATIENT
Start: 2019-01-01 | End: 2019-01-01

## 2019-01-01 RX ORDER — FLUMAZENIL 0.1 MG/ML
0.1 INJECTION INTRAVENOUS ONCE
Status: COMPLETED | OUTPATIENT
Start: 2019-01-01 | End: 2019-01-01

## 2019-01-01 RX ORDER — FUROSEMIDE 20 MG/1
20 TABLET ORAL
Status: DISCONTINUED | OUTPATIENT
Start: 2019-01-01 | End: 2019-01-01

## 2019-01-01 RX ORDER — SODIUM CHLORIDE 9 MG/ML
500 INJECTION, SOLUTION INTRAVENOUS ONCE
Status: COMPLETED | OUTPATIENT
Start: 2019-01-01 | End: 2019-01-01

## 2019-01-01 RX ORDER — SODIUM CHLORIDE 9 MG/ML
INJECTION, SOLUTION INTRAVENOUS CONTINUOUS
Status: DISCONTINUED | OUTPATIENT
Start: 2019-01-01 | End: 2019-01-01

## 2019-01-01 RX ORDER — OMEPRAZOLE 20 MG/1
20 CAPSULE, DELAYED RELEASE ORAL
Status: DISCONTINUED | OUTPATIENT
Start: 2019-01-01 | End: 2019-01-01

## 2019-01-01 RX ORDER — MORPHINE SULFATE 10 MG/ML
5 INJECTION, SOLUTION INTRAMUSCULAR; INTRAVENOUS
Status: DISCONTINUED | OUTPATIENT
Start: 2019-01-01 | End: 2019-01-01 | Stop reason: HOSPADM

## 2019-01-01 RX ORDER — FUROSEMIDE 10 MG/ML
20 INJECTION INTRAMUSCULAR; INTRAVENOUS
Status: DISCONTINUED | OUTPATIENT
Start: 2019-01-01 | End: 2019-01-01

## 2019-01-01 RX ORDER — ONDANSETRON 2 MG/ML
4 INJECTION INTRAMUSCULAR; INTRAVENOUS EVERY 4 HOURS PRN
Status: DISCONTINUED | OUTPATIENT
Start: 2019-01-01 | End: 2019-01-01

## 2019-01-01 RX ORDER — ATROPINE SULFATE 10 MG/ML
2 SOLUTION/ DROPS OPHTHALMIC EVERY 4 HOURS PRN
Status: DISCONTINUED | OUTPATIENT
Start: 2019-01-01 | End: 2019-01-01 | Stop reason: HOSPADM

## 2019-01-01 RX ORDER — HALOPERIDOL 5 MG/ML
1 INJECTION INTRAMUSCULAR EVERY 6 HOURS PRN
Status: DISCONTINUED | OUTPATIENT
Start: 2019-01-01 | End: 2019-01-01 | Stop reason: HOSPADM

## 2019-01-01 RX ORDER — GABAPENTIN 100 MG/1
100 CAPSULE ORAL 3 TIMES DAILY
Status: DISCONTINUED | OUTPATIENT
Start: 2019-01-01 | End: 2019-01-01

## 2019-01-01 RX ADMIN — ENALAPRILAT 1.25 MG: 2.5 INJECTION INTRAVENOUS at 09:15

## 2019-01-01 RX ADMIN — GEMFIBROZIL 600 MG: 600 TABLET ORAL at 17:18

## 2019-01-01 RX ADMIN — GEMFIBROZIL 600 MG: 600 TABLET ORAL at 05:37

## 2019-01-01 RX ADMIN — MORPHINE SULFATE 5 MG: 10 INJECTION INTRAVENOUS at 00:34

## 2019-01-01 RX ADMIN — GEMFIBROZIL 600 MG: 600 TABLET ORAL at 17:03

## 2019-01-01 RX ADMIN — GABAPENTIN 100 MG: 100 CAPSULE ORAL at 05:20

## 2019-01-01 RX ADMIN — GABAPENTIN 100 MG: 100 CAPSULE ORAL at 13:10

## 2019-01-01 RX ADMIN — OMEPRAZOLE 20 MG: 20 CAPSULE, DELAYED RELEASE ORAL at 05:55

## 2019-01-01 RX ADMIN — GABAPENTIN 100 MG: 100 CAPSULE ORAL at 05:54

## 2019-01-01 RX ADMIN — GABAPENTIN 100 MG: 100 CAPSULE ORAL at 05:06

## 2019-01-01 RX ADMIN — MORPHINE SULFATE 5 MG: 10 INJECTION INTRAVENOUS at 01:40

## 2019-01-01 RX ADMIN — GABAPENTIN 600 MG: 300 CAPSULE ORAL at 12:25

## 2019-01-01 RX ADMIN — GEMFIBROZIL 600 MG: 600 TABLET ORAL at 17:53

## 2019-01-01 RX ADMIN — OMEPRAZOLE 20 MG: 20 CAPSULE, DELAYED RELEASE ORAL at 05:47

## 2019-01-01 RX ADMIN — FUROSEMIDE 20 MG: 20 TABLET ORAL at 05:37

## 2019-01-01 RX ADMIN — GABAPENTIN 100 MG: 100 CAPSULE ORAL at 17:29

## 2019-01-01 RX ADMIN — SENNOSIDES AND DOCUSATE SODIUM 2 TABLET: 8.6; 5 TABLET ORAL at 05:20

## 2019-01-01 RX ADMIN — ACETAMINOPHEN 650 MG: 325 TABLET, FILM COATED ORAL at 06:02

## 2019-01-01 RX ADMIN — ONDANSETRON 4 MG: 4 TABLET, ORALLY DISINTEGRATING ORAL at 05:48

## 2019-01-01 RX ADMIN — ASPIRIN 81 MG: 81 TABLET, COATED ORAL at 05:47

## 2019-01-01 RX ADMIN — ACETAMINOPHEN 650 MG: 325 TABLET, FILM COATED ORAL at 05:54

## 2019-01-01 RX ADMIN — GABAPENTIN 100 MG: 100 CAPSULE ORAL at 12:07

## 2019-01-01 RX ADMIN — OMEPRAZOLE 20 MG: 20 CAPSULE, DELAYED RELEASE ORAL at 05:20

## 2019-01-01 RX ADMIN — GEMFIBROZIL 600 MG: 600 TABLET ORAL at 05:06

## 2019-01-01 RX ADMIN — GEMFIBROZIL 600 MG: 600 TABLET ORAL at 17:29

## 2019-01-01 RX ADMIN — SENNOSIDES AND DOCUSATE SODIUM 2 TABLET: 8.6; 5 TABLET ORAL at 05:55

## 2019-01-01 RX ADMIN — GABAPENTIN 100 MG: 100 CAPSULE ORAL at 17:53

## 2019-01-01 RX ADMIN — GABAPENTIN 600 MG: 300 CAPSULE ORAL at 05:55

## 2019-01-01 RX ADMIN — GEMFIBROZIL 600 MG: 600 TABLET ORAL at 05:20

## 2019-01-01 RX ADMIN — ATROPINE SULFATE 2 DROP: 10 SOLUTION/ DROPS OPHTHALMIC at 01:43

## 2019-01-01 RX ADMIN — SODIUM CHLORIDE 500 ML: 9 INJECTION, SOLUTION INTRAVENOUS at 00:00

## 2019-01-01 RX ADMIN — LORAZEPAM 0.5 MG: 2 INJECTION INTRAMUSCULAR; INTRAVENOUS at 09:11

## 2019-01-01 RX ADMIN — SODIUM CHLORIDE 500 ML: 9 INJECTION, SOLUTION INTRAVENOUS at 10:15

## 2019-01-01 RX ADMIN — ACETAMINOPHEN 650 MG: 325 TABLET, FILM COATED ORAL at 20:35

## 2019-01-01 RX ADMIN — FLUMAZENIL 0.1 MG: 0.1 INJECTION, SOLUTION INTRAVENOUS at 09:44

## 2019-01-01 RX ADMIN — ASPIRIN 81 MG: 81 TABLET, COATED ORAL at 05:06

## 2019-01-01 RX ADMIN — SENNOSIDES AND DOCUSATE SODIUM 2 TABLET: 8.6; 5 TABLET ORAL at 06:21

## 2019-01-01 RX ADMIN — GABAPENTIN 600 MG: 300 CAPSULE ORAL at 17:18

## 2019-01-01 RX ADMIN — GABAPENTIN 100 MG: 100 CAPSULE ORAL at 17:03

## 2019-01-01 RX ADMIN — ATENOLOL 50 MG: 50 TABLET ORAL at 05:47

## 2019-01-01 RX ADMIN — GABAPENTIN 100 MG: 100 CAPSULE ORAL at 13:26

## 2019-01-01 RX ADMIN — GABAPENTIN 100 MG: 100 CAPSULE ORAL at 17:45

## 2019-01-01 RX ADMIN — GEMFIBROZIL 600 MG: 600 TABLET ORAL at 05:55

## 2019-01-01 RX ADMIN — FUROSEMIDE 10 MG: 10 INJECTION, SOLUTION INTRAMUSCULAR; INTRAVENOUS at 17:03

## 2019-01-01 RX ADMIN — FUROSEMIDE 20 MG: 20 TABLET ORAL at 05:06

## 2019-01-01 RX ADMIN — GABAPENTIN 600 MG: 300 CAPSULE ORAL at 05:47

## 2019-01-01 RX ADMIN — GABAPENTIN 100 MG: 100 CAPSULE ORAL at 06:21

## 2019-01-01 RX ADMIN — GEMFIBROZIL 600 MG: 600 TABLET ORAL at 05:48

## 2019-01-01 RX ADMIN — ASPIRIN 81 MG: 81 TABLET, COATED ORAL at 05:48

## 2019-01-01 RX ADMIN — ACETAMINOPHEN 650 MG: 325 TABLET, FILM COATED ORAL at 10:02

## 2019-01-01 RX ADMIN — ASPIRIN 81 MG: 81 TABLET, COATED ORAL at 05:20

## 2019-01-01 RX ADMIN — GABAPENTIN 100 MG: 100 CAPSULE ORAL at 13:45

## 2019-01-01 RX ADMIN — GEMFIBROZIL 600 MG: 600 TABLET ORAL at 17:45

## 2019-01-01 RX ADMIN — ASPIRIN 81 MG: 81 TABLET, COATED ORAL at 06:21

## 2019-01-01 RX ADMIN — GEMFIBROZIL 600 MG: 600 TABLET ORAL at 06:21

## 2019-01-01 RX ADMIN — FUROSEMIDE 20 MG: 20 TABLET ORAL at 05:48

## 2019-01-01 RX ADMIN — MORPHINE SULFATE 5 MG: 10 INJECTION INTRAVENOUS at 16:49

## 2019-01-01 RX ADMIN — OMEPRAZOLE 20 MG: 20 CAPSULE, DELAYED RELEASE ORAL at 05:48

## 2019-01-01 RX ADMIN — FUROSEMIDE 20 MG: 20 TABLET ORAL at 05:20

## 2019-01-01 RX ADMIN — ASPIRIN 81 MG: 81 TABLET, COATED ORAL at 05:36

## 2019-01-01 RX ADMIN — FUROSEMIDE 10 MG: 10 INJECTION, SOLUTION INTRAMUSCULAR; INTRAVENOUS at 10:00

## 2019-01-01 RX ADMIN — ASPIRIN 81 MG: 81 TABLET, COATED ORAL at 05:55

## 2019-01-01 RX ADMIN — OMEPRAZOLE 20 MG: 20 CAPSULE, DELAYED RELEASE ORAL at 05:36

## 2019-01-01 RX ADMIN — FUROSEMIDE 10 MG: 10 INJECTION, SOLUTION INTRAMUSCULAR; INTRAVENOUS at 06:20

## 2019-01-01 RX ADMIN — FUROSEMIDE 20 MG: 20 TABLET ORAL at 13:26

## 2019-01-01 RX ADMIN — OMEPRAZOLE 20 MG: 20 CAPSULE, DELAYED RELEASE ORAL at 05:06

## 2019-01-01 RX ADMIN — GABAPENTIN 100 MG: 100 CAPSULE ORAL at 05:37

## 2019-01-01 RX ADMIN — SODIUM CHLORIDE: 9 INJECTION, SOLUTION INTRAVENOUS at 05:48

## 2019-01-01 RX ADMIN — OMEPRAZOLE 20 MG: 20 CAPSULE, DELAYED RELEASE ORAL at 06:20

## 2019-01-01 RX ADMIN — ACETAMINOPHEN 650 MG: 325 TABLET, FILM COATED ORAL at 06:21

## 2019-01-01 RX ADMIN — SENNOSIDES AND DOCUSATE SODIUM 2 TABLET: 8.6; 5 TABLET ORAL at 17:18

## 2019-01-01 RX ADMIN — FUROSEMIDE 20 MG: 10 INJECTION, SOLUTION INTRAMUSCULAR; INTRAVENOUS at 09:04

## 2019-01-01 RX ADMIN — DILTIAZEM HYDROCHLORIDE 10 MG: 5 INJECTION INTRAVENOUS at 07:20

## 2019-01-01 RX ADMIN — FUROSEMIDE 20 MG: 10 INJECTION, SOLUTION INTRAMUSCULAR; INTRAVENOUS at 09:13

## 2019-01-01 RX ADMIN — GABAPENTIN 100 MG: 100 CAPSULE ORAL at 17:37

## 2019-01-01 RX ADMIN — ACETAMINOPHEN 650 MG: 325 TABLET, FILM COATED ORAL at 17:37

## 2019-01-01 RX ADMIN — ACETAMINOPHEN 650 MG: 325 TABLET, FILM COATED ORAL at 09:47

## 2019-01-01 RX ADMIN — ACETAMINOPHEN 650 MG: 325 TABLET, FILM COATED ORAL at 18:14

## 2019-01-01 RX ADMIN — GEMFIBROZIL 600 MG: 600 TABLET ORAL at 17:37

## 2019-01-01 ASSESSMENT — ENCOUNTER SYMPTOMS
CHILLS: 0
TREMORS: 0
PND: 0
NECK PAIN: 0
CONSTIPATION: 0
CONSTIPATION: 0
CARDIOVASCULAR NEGATIVE: 1
EYE PAIN: 0
TREMORS: 0
NAUSEA: 0
PALPITATIONS: 0
VOMITING: 0
BACK PAIN: 0
SHORTNESS OF BREATH: 1
CHILLS: 0
DIARRHEA: 0
SHORTNESS OF BREATH: 1
FEVER: 0
BLURRED VISION: 0
NEUROLOGICAL NEGATIVE: 1
TREMORS: 0
VOMITING: 0
HEARTBURN: 0
WEIGHT LOSS: 0
SHORTNESS OF BREATH: 1
SENSORY CHANGE: 0
NAUSEA: 0
DIZZINESS: 0
WEAKNESS: 1
NAUSEA: 1
CONSTITUTIONAL NEGATIVE: 1
DIARRHEA: 0
FEVER: 0
PALPITATIONS: 0
SENSORY CHANGE: 0
VOMITING: 1
NAUSEA: 0
DIARRHEA: 0
TREMORS: 0
ABDOMINAL PAIN: 0
DEPRESSION: 0
ABDOMINAL PAIN: 0
FALLS: 0
SEIZURES: 0
CONSTIPATION: 0
CHILLS: 0
BLURRED VISION: 0
DIZZINESS: 0
FEVER: 0
HEADACHES: 0
NECK PAIN: 0
FEVER: 0
VOMITING: 0
WEIGHT LOSS: 0
CHILLS: 0
HEARTBURN: 1
WEAKNESS: 1
SPEECH CHANGE: 0
FALLS: 0
HEADACHES: 0
MUSCULOSKELETAL NEGATIVE: 1
SPUTUM PRODUCTION: 0
BACK PAIN: 0
PSYCHIATRIC NEGATIVE: 1
SEIZURES: 0
SPEECH CHANGE: 0
HEARTBURN: 0
EYES NEGATIVE: 1
ABDOMINAL PAIN: 0
PALPITATIONS: 0
COUGH: 1
NAUSEA: 1
PALPITATIONS: 0
DEPRESSION: 0
WHEEZING: 0
RESPIRATORY NEGATIVE: 1
NAUSEA: 0
COUGH: 1
VOMITING: 0
PND: 0
WHEEZING: 0
SPUTUM PRODUCTION: 0
EYE PAIN: 0

## 2019-01-01 ASSESSMENT — COGNITIVE AND FUNCTIONAL STATUS - GENERAL
WALKING IN HOSPITAL ROOM: TOTAL
SUGGESTED CMS G CODE MODIFIER DAILY ACTIVITY: CL
MOVING TO AND FROM BED TO CHAIR: UNABLE
MOVING TO AND FROM BED TO CHAIR: A LOT
STANDING UP FROM CHAIR USING ARMS: A LOT
HELP NEEDED FOR BATHING: TOTAL
DRESSING REGULAR UPPER BODY CLOTHING: A LOT
MOVING FROM LYING ON BACK TO SITTING ON SIDE OF FLAT BED: UNABLE
DAILY ACTIVITIY SCORE: 13
DRESSING REGULAR UPPER BODY CLOTHING: A LOT
MOVING FROM LYING ON BACK TO SITTING ON SIDE OF FLAT BED: UNABLE
CLIMB 3 TO 5 STEPS WITH RAILING: TOTAL
HELP NEEDED FOR BATHING: A LOT
SUGGESTED CMS G CODE MODIFIER MOBILITY: CM
DAILY ACTIVITIY SCORE: 11
TURNING FROM BACK TO SIDE WHILE IN FLAT BAD: A LOT
TOILETING: TOTAL
CLIMB 3 TO 5 STEPS WITH RAILING: TOTAL
PERSONAL GROOMING: A LOT
TOILETING: A LOT
DRESSING REGULAR LOWER BODY CLOTHING: A LOT
PERSONAL GROOMING: A LOT
EATING MEALS: A LOT
TURNING FROM BACK TO SIDE WHILE IN FLAT BAD: UNABLE
STANDING UP FROM CHAIR USING ARMS: A LOT
MOBILITY SCORE: 9
SUGGESTED CMS G CODE MODIFIER DAILY ACTIVITY: CL
SUGGESTED CMS G CODE MODIFIER MOBILITY: CM
MOBILITY SCORE: 8
WALKING IN HOSPITAL ROOM: A LOT
DRESSING REGULAR LOWER BODY CLOTHING: A LOT

## 2019-01-01 ASSESSMENT — ACTIVITIES OF DAILY LIVING (ADL)
DRESSING_REQUIRES_ASSISTANCE: 1
TOILETING: INDEPENDENT
AMBULATION_REQUIRES_ASSISTANCE: 1
PHYSICAL_TRANSFER_REQUIRES_ASSISTANCE: 1
BATHING_REQUIRES_ASSISTANCE: 1

## 2019-01-01 ASSESSMENT — PATIENT HEALTH QUESTIONNAIRE - PHQ9
SUM OF ALL RESPONSES TO PHQ9 QUESTIONS 1 AND 2: 0
2. FEELING DOWN, DEPRESSED, IRRITABLE, OR HOPELESS: NOT AT ALL
1. LITTLE INTEREST OR PLEASURE IN DOING THINGS: NOT AT ALL

## 2019-01-01 ASSESSMENT — LIFESTYLE VARIABLES
SUBSTANCE_ABUSE: 0
DO YOU DRINK ALCOHOL: NO
SUBSTANCE_ABUSE: 0
EVER_SMOKED: NEVER
ALCOHOL_USE: NO

## 2019-01-01 ASSESSMENT — GAIT ASSESSMENTS
DISTANCE (FEET): 5
ASSISTIVE DEVICE: FRONT WHEEL WALKER
GAIT LEVEL OF ASSIST: MINIMAL ASSIST

## 2019-01-01 ASSESSMENT — NEW YORK HEART ASSOCIATION (NYHA) CLASSIFICATION: PATIENT MEETS NYHA AND SIGNIFICANT SYMPTOMS CRITERIA: 1

## 2019-02-24 PROBLEM — E86.0 DEHYDRATION: Status: ACTIVE | Noted: 2019-01-01

## 2019-02-24 PROBLEM — R11.2 NAUSEA AND VOMITING: Status: ACTIVE | Noted: 2019-01-01

## 2019-02-24 PROBLEM — I50.32 CHRONIC DIASTOLIC CONGESTIVE HEART FAILURE (HCC): Status: ACTIVE | Noted: 2019-01-01

## 2019-02-24 PROBLEM — E83.52 HYPERCALCEMIA: Status: ACTIVE | Noted: 2019-01-01

## 2019-02-24 NOTE — PROGRESS NOTES
I examined the patient, patient clearly have signs of fluid overload including pulmonary crackles, significant lower extremity edema, and respiratory distress.  Patient chest x-ray also showing signs of pulmonary congestion.    Patient was giving IV fluid by ER physician and by admitting physician.  I immediately stop all IV fluid.  I ordered Lasix which patient supposed to take but unable to take at home.  I also canceled a CT abdomen because patient kidney function is not optimal and cannot tolerate challenge of IV contrast.  I instead ordered KUB to rule out small bowel obstruction however patient currently does not have any symptoms of abdominal pain or unable to pass bowel movement.    Patient currently have signs of acute on chronic CHF exacerbation.  Start patient on IV Lasix pending echocardiogram to review patient's cardiac function.  Patient does have severe aortic stenosis as history and I cleared with patient that she does not want any further invasive intervention for that.    I also ordered palliative consultation.    I discussed above plan with nursing staff.

## 2019-02-24 NOTE — H&P
"Hospital Medicine History & Physical Note    Date of Service  2/24/2019    Primary Care Physician  Isadora Martell P.A.-C.    Consultants  None    Code Status  DNAR/DNI    Chief Complaint  Nausea and vomiting     History of Presenting Illness  78 y.o. female with history of CHF with diastolic dysfunction with no apparent exacerbation, essential hypertension which is controlled, and gastroesophageal reflux disease for which she is intermittently taking PPI therapy was apparently well until 3 months prior to admission.  She reports the onset of daily nausea and vomiting, with no abdominal pain, diarrhea or constipation.  She states that she has vomited multiple times daily since that time, and has not thought to seek medical care until the day prior to admission when she reportedly became \"scared\".  She denies any chest pain, reports that her breathing is at baseline, denies dysuria, does report headache without vision changes.  No other complaints.  She is alert and oriented.      Review of Systems  Review of Systems   Constitutional: Negative.    HENT: Negative.    Eyes: Negative.    Respiratory: Negative.    Cardiovascular: Negative.    Gastrointestinal: Positive for heartburn, nausea and vomiting. Negative for abdominal pain, constipation and diarrhea.   Genitourinary: Negative.    Musculoskeletal: Negative.    Skin: Negative.    Neurological: Negative.    Endo/Heme/Allergies: Negative.    Psychiatric/Behavioral: Negative.        Past Medical History   has a past medical history of Aortic stenosis; Arthritis; Breath shortness; Dental disorder; GERD (gastroesophageal reflux disease); Heart murmur; Hyperlipidemia; Hypertension; Pain (10/7/16); Restless leg syndrome; Stroke ([facility]); and Urinary tract infection, site not specified. She also has no past medical history of Allergy or Diabetes.    Surgical History   has a past surgical history that includes tubal coagulation laparoscopic bilateral (1970); cataract phaco with " iol (Right, 10/11/2016); and cataract phaco with iol (Left, 11/8/2016).     Family History  family history includes Cancer in her daughter, daughter, and son. She was adopted.     Social History   reports that she is a non-smoker but has been exposed to tobacco smoke. She has never used smokeless tobacco. She reports that she does not drink alcohol or use drugs.    Allergies  No Known Allergies    Medications  Prior to Admission Medications   Prescriptions Last Dose Informant Patient Reported? Taking?   Cholecalciferol (VITAMIN D-3 PO)  Patient Yes No   Sig: Take 1 Cap by mouth every day.     Cyanocobalamin (VITAMIN B 12 PO)  Patient Yes No   Sig: Take 1 Tab by mouth every morning.   Multiple Vitamin (MULTI-VITAMIN) TABS  Patient Yes No   Sig: Take 1 Tab by mouth every day.     Omega-3 Fatty Acids (FISH OIL PO)  Patient Yes No   Sig: Take 1 Cap by mouth every day.     SUMAtriptan (IMITREX) 25 MG Tab tablet   No No   Sig: TAKE 1 TO 4 TABLETS BY MOUTH ONCE AS NEEDED FOR MIGRAINE FOR UP TO 1 DOSE   VITAMIN A PO  Patient Yes No   Sig: Take 1 Tab by mouth every day.     aspirin EC (ECOTRIN) 81 MG TBEC  Patient Yes No   Sig: Take 81 mg by mouth every day.     atenolol (TENORMIN) 50 MG Tab   No No   Sig: Take 1 Tab by mouth every day.   furosemide (LASIX) 20 MG Tab   No No   Sig: Take 1 Tab by mouth 2 times a day.   gabapentin (NEURONTIN) 600 MG tablet  Patient No No   Sig: Take 1 Tab by mouth 3 times a day.   gemfibrozil (LOPID) 600 MG Tab  Patient No No   Sig: Take 1 Tab by mouth 2 times a day.   omeprazole (PRILOSEC) 20 MG delayed-release capsule  Patient Yes No   Sig: Take 20 mg by mouth 1 time daily as needed.   potassium chloride ER (KLOR-CON) 10 MEQ tablet   No No   Sig: Take 1 Tab by mouth every day.   tizanidine (ZANAFLEX) 2 MG tablet   No No   Sig: Take 2 Tabs by mouth at bedtime as needed (sleep).      Facility-Administered Medications: None       Physical Exam  Temp:  [36.9 °C (98.4 °F)] 36.9 °C (98.4  °F)  Pulse:  [125-127] 125  SpO2:  [96 %-97 %] 97 %    Physical Exam   Constitutional: She is oriented to person, place, and time. She appears well-developed and well-nourished. No distress.   HENT:   Head: Normocephalic and atraumatic.   Dental fractures     Eyes: Pupils are equal, round, and reactive to light. Conjunctivae are normal.   Neck: Normal range of motion. Neck supple. No tracheal deviation present. No thyromegaly present.   Cardiovascular: Normal rate, regular rhythm and normal heart sounds.  Exam reveals no gallop and no friction rub.    No murmur heard.  Pulmonary/Chest: Effort normal and breath sounds normal. No respiratory distress. She has no wheezes. She has no rales.   Abdominal: Soft. Bowel sounds are normal. She exhibits no distension. There is no tenderness. There is no rebound.   Musculoskeletal: Normal range of motion. She exhibits edema.   Lymphadenopathy:     She has no cervical adenopathy.   Neurological: She is alert and oriented to person, place, and time. No cranial nerve deficit.   Skin: Skin is warm and dry. She is not diaphoretic. There is erythema.   Bilateral lower extremities with burst blisters R>L   Psychiatric: She has a normal mood and affect.   Nursing note and vitals reviewed.      Laboratory:  Recent Labs      02/23/19   2342   WBC  8.1   RBC  5.33   HEMOGLOBIN  16.5*   HEMATOCRIT  53.5*   MCV  100.4*   MCH  31.0   MCHC  30.8*   RDW  60.6*   PLATELETCT  252   MPV  11.9     Recent Labs      02/23/19   2342   SODIUM  142   POTASSIUM  3.9   CHLORIDE  102   CO2  21   GLUCOSE  121*   BUN  40*   CREATININE  1.10   CALCIUM  10.7*     Recent Labs      02/23/19   2342   ALTSGPT  34   ASTSGOT  55*   ALKPHOSPHAT  121*   TBILIRUBIN  1.1   GLUCOSE  121*     Recent Labs      02/23/19   2342   APTT  30.4   INR  2.29*             No results for input(s): TROPONINI in the last 72 hours.    Urinalysis:    No results found     Imaging:  DX-CHEST-PORTABLE (1 VIEW)   Final Result         1.   Pulmonary edema and/or infiltrates are identified, which appear somewhat increased since the prior exam.   2.  Trace right pleural effusion   3.  Cardiomegaly   4.  Atherosclerosis      EC-ECHOCARDIOGRAM COMPLETE W/ CONT    (Results Pending)         Assessment/Plan:  I anticipate this patient is appropriate for observation status at this time.    * Dehydration   Assessment & Plan    With elevated BUN and increased Crea from baseline, in the setting of reported history of nausea and vomiting for past 3 months.  Will hold lasix therapy, gentle hydration monitoring respiratory status.       Chronic diastolic congestive heart failure (HCC)   Assessment & Plan    Unclear if acute exacerbation, versus changes due to severe dehydration in setting of chronic nausea and vomiting.  Will check echocardiogram.  Hold diuretic therapy for now as patient reports baseline respiratory status.       Hypercalcemia   Assessment & Plan    Suspect due to volume loss.  Monitor with fluid therapy.      Nausea and vomiting   Assessment & Plan    Unclear etiology, with long-standing chronicity of 3 months reportedly.  Concern for possible worsening GERD as she states only partial compliance with omeprazole.  Will check CT abdomen and pelvis w/wo contrast.      GERD (gastroesophageal reflux disease)- (present on admission)   Assessment & Plan    Intermittently on omeprazole therapy, which may be the cause of her nausea and vomiting.  Restart omeprazole therapy.       Headache, migraine- (present on admission)   Assessment & Plan    No current migraine.  Monitor.      Essential hypertension, benign- (present on admission)   Assessment & Plan    Controlled with current medication regimen.  Monitor.           VTE prophylaxis: SCD, lovenox

## 2019-02-24 NOTE — ASSESSMENT & PLAN NOTE
With elevated BUN and increased Crea from baseline, in the setting of reported history of nausea and vomiting for past 3 months.

## 2019-02-24 NOTE — PROGRESS NOTES
· 2 RN skin assessment with JIM Villanueva:    · Devices in place silicone O2 tubing.  · Skin assessed under devices n/a.  · Confirmed wounds found on BL LE. Wound consult placed and wound reported.  · The following interventions in place:    Pt's face has general scabbing. BL arms have general scabbing and bruising. Sacrum is red but blanching. Perineal area red and moist. BL LE have scattered open wounds, scabs and bruises; photos taken and uploaded to [facility]. Left 5th digit has a scab. BL heels red and blanching. Q2 turns and waflle mattress will be implemented. Silicone O2 tubing in place. Will float heels.

## 2019-02-24 NOTE — ED NOTES
Bedside report to Bernie FERNANDEZ. Pt transported to [id number] by RN with cardiac monitor and oxygen.

## 2019-02-24 NOTE — PROGRESS NOTES
Assumed care of patient, bedside report received from Guerline. Updated on POC, call light within reach and fall precautions in place. Bed locked and in lowest position. Patient instructed to call for assistance before getting out of bed. All questions answered, no other needs at this time.

## 2019-02-24 NOTE — ASSESSMENT & PLAN NOTE
Unclear etiology, with long-standing chronicity of 3 months reportedly.   CT abdomen confirmed the patient has small bowel ischemia.  Patient does not want any surgery. High risk for mortality. Patient critically ill.

## 2019-02-24 NOTE — PROGRESS NOTES
Patient CT abdomen results showing pneumatosis involving multiple loops of small bowel consistent with ischemic bowel.  However patient denies significant abdominal pain.  I immediately consult the on-call surgeon Dr. Abbasi.

## 2019-02-24 NOTE — PROGRESS NOTES
· 2 RN skin check complete with JIM Genao.  · Devices in place oxygen tubing.  · Skin assessed under devices completed.  · Confirmed pressure ulcers found on NA.  · New potential pressure ulcers noted on na. Wound consult placed and wound reported.  · The following interventions in place waffle matress, q2 turns, float boots, mepilex, grey foam padding      Ears red but blanching, grey foam padding used  Scabbing on forehead  General scabbing bilateral arms  Buttocks red but slow to veda, mepilex placed, patient is on waffle mattress with q2 turns  heels red and boggy, float boots placed,   in between toes is red but blanching  Right outer side of great toe red and blanching  Bilateral legs pitting edema with popped blisters with sloughed off skin, mepelites placed. Wound consult in, pictures taken.  Patient turned from side to side, pillows used for support and positioning

## 2019-02-24 NOTE — CARE PLAN
Problem: Safety  Goal: Will remain free from injury  Outcome: PROGRESSING AS EXPECTED  Fall  precaution ion place, patient calls appropriately     Problem: Skin Integrity  Goal: Risk for impaired skin integrity will decrease  Outcome: PROGRESSING AS EXPECTED  Patient placed on waffle, educated the patient on turning self from side to side, mepilex placed, skin dressings placed on legs, float boots on

## 2019-02-24 NOTE — CONSULTS
Consult  2/24/2019        CC: Abnormality on imaging    HPI: Ms. Tori Guajardo is a very pleasant 78 y.o. female.   She is awake alert and appropriate.    She states she is in the hospital she had difficulty eating associated with nausea and vomiting for several weeks.    She is emphatic that she does not wish surgery.  She states that if your condition worsens and becomes life-threatening she still does declines surgery with the understanding she will die.  She was friendly and pleasant but states she did not wish to discuss it further  She was seen at lunchtime when she was eating she reports tolerating diet no pain feeling much better than previously    Past Medical History:   Diagnosis Date   • Aortic stenosis    • Arthritis    • Breath shortness    • Dental disorder    • GERD (gastroesophageal reflux disease)    • Heart murmur    • Hyperlipidemia    • Hypertension    • Pain 10/7/16    lower back, hips and legs   • Restless leg syndrome     Dr. adler    • Stroke (HCC)     had in 1970s   • Urinary tract infection, site not specified        Past Surgical History:   Procedure Laterality Date   • CATARACT PHACO WITH IOL Left 11/8/2016    Procedure: CATARACT PHACO WITH IOL;  Surgeon: Sam Whatley M.D.;  Location: [facility];  Service:    • CATARACT PHACO WITH IOL Right 10/11/2016    Procedure: CATARACT PHACO WITH IOL;  Surgeon: Sam Whatley M.D.;  Location: [facility];  Service:    • TUBAL COAGULATION LAPAROSCOPIC BILATERAL  1970       Current Facility-Administered Medications   Medication Dose Route Frequency Provider Last Rate Last Dose   • aspirin EC (ECOTRIN) tablet 81 mg  81 mg Oral DAILY Shay Jorge M.D.   81 mg at 02/24/19 0547   • gabapentin (NEURONTIN) capsule 600 mg  600 mg Oral TID Shay Jorge M.D.   600 mg at 02/24/19 1225   • gemfibrozil (LOPID) tablet 600 mg  600 mg Oral BID Shay Jorge M.D.   600 mg at 02/24/19 0548   • tizanidine (ZANAFLEX) tablet  4 mg  4 mg Oral QHS PRN Shay Jorge M.D.       • omeprazole (PRILOSEC) capsule 20 mg  20 mg Oral DAILY Shay Jorge M.D.   20 mg at 02/24/19 0547   • ondansetron (ZOFRAN) syringe/vial injection 4 mg  4 mg Intravenous Q4HRS PRN Shay Jorge M.D.       • ondansetron (ZOFRAN ODT) dispertab 4 mg  4 mg Oral Q4HRS PRN Shay Jorge M.D.   4 mg at 02/24/19 0548   • senna-docusate (PERICOLACE or SENOKOT S) 8.6-50 MG per tablet 2 Tab  2 Tab Oral BID Shay Jorge M.D.        And   • polyethylene glycol/lytes (MIRALAX) PACKET 1 Packet  1 Packet Oral QDAY PRN Shay Jorge M.D.        And   • magnesium hydroxide (MILK OF MAGNESIA) suspension 30 mL  30 mL Oral QDAY PRN Shay Jorge M.D.        And   • bisacodyl (DULCOLAX) suppository 10 mg  10 mg Rectal QDAY PRN Shay Jorge M.D.       • acetaminophen (TYLENOL) tablet 650 mg  650 mg Oral Q6HRS PRN Shay Jorge M.D.       • furosemide (LASIX) injection 20 mg  20 mg Intravenous Q DAY Erin Rdz M.D.   20 mg at 02/24/19 0913   • [START ON 2/25/2019] atenolol (TENORMIN) tablet 25 mg  25 mg Oral DAILY Erin Rdz M.D.           Social History     Social History   • Marital status:      Spouse name: N/A   • Number of children: N/A   • Years of education: N/A     Occupational History   • Not on file.     Social History Main Topics   • Smoking status: Passive Smoke Exposure - Never Smoker   • Smokeless tobacco: Never Used   • Alcohol use No   • Drug use: No   • Sexual activity: No      Comment: .      Other Topics Concern   • Not on file     Social History Narrative   • No narrative on file       Family History   Problem Relation Age of Onset   • Adopted: Yes   • Cancer Daughter         Cervical CA   • Cancer Daughter         Cervial CA   • Cancer Son         Esophageal Cancer       Allergies:  Patient has no known allergies.    Review of Systems:  Positive as noted above otherwise negative    Physical Exam:  Blood pressure 104/74, pulse (!) 102,  temperature 36.8 °C (98.3 °F), temperature source Temporal, resp. rate 15, height 1.524 m (5'), weight 47.3 kg (104 lb 4.4 oz), SpO2 90 %, not currently breastfeeding.    Constitutional: Awake, alert, . No acute distress.   Head: No cephalohematoma.  Normocephalic  Neck: No tracheal deviation. No stridor  Cardiovascular: Normal rate, skin warm brisk capillary refill   pulmonary/Chest: No cough no distress  Abdominal: Soft, nondistended. Nontender to palpation.   Musculoskeletal: Warm dry   Neurological: GCS 15 E4 V5 M6.  Skin: Skin is warm and dry.   Psychiatric:  Normal mood and affect.  Behavior is appropriate.       Labs:  Recent Labs      02/23/19   2342   WBC  8.1   RBC  5.33   HEMOGLOBIN  16.5*   HEMATOCRIT  53.5*   MCV  100.4*   MCH  31.0   MCHC  30.8*   RDW  60.6*   PLATELETCT  252   MPV  11.9     Recent Labs      02/23/19   2342   SODIUM  142   POTASSIUM  3.9   CHLORIDE  102   CO2  21   GLUCOSE  121*   BUN  40*   CREATININE  1.10   CALCIUM  10.7*     Recent Labs      02/23/19   2342   APTT  30.4   INR  2.29*     Recent Labs      02/23/19   2342   ASTSGOT  55*   ALTSGPT  34   TBILIRUBIN  1.1   ALKPHOSPHAT  121*   GLOBULIN  2.7   INR  2.29*       Radiology:  [id number] VIEW   Final Result      No evidence of bowel dilatation.      Pneumatosis.      CT-ABDOMEN-PELVIS W/O   Final Result      Pneumatosis involving multiple loops of small bowel. This is worrisome for ischemia.      No portal venous gas or free intraperitoneal air is identified.      There may be wall thickening of the ascending colon and cecum which may be infectious/inflammatory.      Nonvisualization of the appendix, limiting evaluation.      Colonic diverticulosis.      Cholelithiasis. There may be mild gallbladder wall thickening. Further evaluation can be obtained with right upper quadrant ultrasound.      Prominent atherosclerotic plaque.      Small to moderate right and trace left pleural effusions.      Extensive airspace opacities  bilaterally likely represent pulmonary edema. Multifocal pneumonia is not excluded.      Prominent cardiomegaly.      Findings discussed with Dr. Saba.         DX-CHEST-PORTABLE (1 VIEW)   Final Result         1.  Pulmonary edema and/or infiltrates are identified, which appear somewhat increased since the prior exam.   2.  Trace right pleural effusion   3.  Cardiomegaly   4.  Atherosclerosis      EC-ECHOCARDIOGRAM COMPLETE W/ CONT    (Results Pending)         Assessment: This is a 78 y.o. abnormal finding on imaging as above concerning for ischemia    Plan:   Active Hospital Problems    Diagnosis   • Chronic diastolic congestive heart failure (HCC) [I50.32]     Priority: High   • Dehydration [E86.0]   • Nausea and vomiting [R11.2]   • Hypercalcemia [E83.52]   • GERD (gastroesophageal reflux disease) [K21.9]   • Headache, migraine [G43.909]   • Essential hypertension, benign [I10]     Discussed with Mrs. Guajardo findings.  She demonstrated understanding.  She was polite and friendly but adamant that she did not wish any surgical intervention even if it was necessary to save her life    Care in accordance with their wishes.    Monitoring and support.    Available to assist as needed    Jewel Abbasi MD, FACS  [facility]   [phone]

## 2019-02-24 NOTE — ED PROVIDER NOTES
ED Provider Note    CHIEF COMPLAINT  Chief Complaint   Patient presents with   • N/V     x 'few weeks'   • Peripheral Edema     BLE       HPI  Tori Guajardo is a 78 y.o. female who presents with vomiting.  The patient states his been vomiting over the last 3 days.  She is been unable to keep down her medication.  She is also had an increase in her edema to the lower extremities but she is been unable to take her Lasix as mentioned above.  She does not have any pain associated vomiting.  She has not any change in bowel or bladder function.  The patient did develop some shortness of breath in route to the emerge department via EMS.  She states she does have CHF but states she does not have any COPD.  The patient has not had any associated fevers.  She does not have a headache.    REVIEW OF SYSTEMS  See HPI for further details. All other systems are negative.     PAST MEDICAL HISTORY  Past Medical History:   Diagnosis Date   • Pain 10/7/16    lower back, hips and legs   • Aortic stenosis    • Arthritis    • Breath shortness    • Dental disorder    • GERD (gastroesophageal reflux disease)    • Heart murmur    • Hyperlipidemia    • Hypertension    • Restless leg syndrome     Dr. adler    • Stroke (HCC)     had in 1970s   • Urinary tract infection, site not specified        FAMILY HISTORY  [unfilled]    SOCIAL HISTORY  Social History     Social History   • Marital status:      Spouse name: N/A   • Number of children: N/A   • Years of education: N/A     Social History Main Topics   • Smoking status: Passive Smoke Exposure - Never Smoker   • Smokeless tobacco: Never Used   • Alcohol use No   • Drug use: No   • Sexual activity: No      Comment: .      Other Topics Concern   • Not on file     Social History Narrative   • No narrative on file       SURGICAL HISTORY  Past Surgical History:   Procedure Laterality Date   • CATARACT PHACO WITH IOL Left 11/8/2016    Procedure: CATARACT PHACO WITH IOL;  Surgeon: Sam  EMERSON Whatley M.D.;  Location: [facility];  Service:    • CATARACT PHACO WITH IOL Right 10/11/2016    Procedure: CATARACT PHACO WITH IOL;  Surgeon: Sam Whatley M.D.;  Location: [facility];  Service:    • TUBAL COAGULATION LAPAROSCOPIC BILATERAL  1970       CURRENT MEDICATIONS  Home Medications    **Home medications have not yet been reviewed for this encounter**         ALLERGIES  No Known Allergies    PHYSICAL EXAM  VITAL SIGNS: Pulse (!) 127   Temp 36.9 °C (98.4 °F) (Tympanic)   Resp (!) 0   Ht 1.524 m (5')   Wt 51 kg (112 lb 7 oz)   SpO2 96%   BMI 21.96 kg/m²       Constitutional: Chronically ill in appearance  HENT: Normocephalic, Atraumatic, Bilateral external ears normal, Oropharynx dry with poor dentition, No oral exudates, Nose normal.   Eyes: PERRLA, EOMI, Conjunctiva normal, No discharge.   Neck: Normal range of motion, No tenderness, Supple, No stridor.   Lymphatic: No lymphadenopathy noted.   Cardiovascular: Tachycardic heart rate, Normal rhythm, No murmurs, No rubs, No gallops.   Thorax & Lungs: Symmetrically diminished throughout, slight diffuse wheezing, tachypnea, no retractions.   Abdomen: Bowel sounds normal, Soft, No tenderness, No masses, No pulsatile masses.   Skin: Lower extremity ulcerations are very superficial in nature  Back: No tenderness, No CVA tenderness.   Extremities: Intact distal pulses, pitting edema to the bilateral lower extremities, no tenderness, No cyanosis, No clubbing.   Neurologic: Alert & oriented x 3, Normal motor function, Normal sensory function, No focal deficits noted.   Psychiatric: Affect normal, Judgment normal, Mood normal.     EKG  Twelve-lead EKG interpreted by myself shows sinus tachycardia with a ventricular rate of 126, QRS is poor R wave progression and left ventricular hypertrophy, no ST segment elevation, ST segment depression laterally, normal T waves.    Repeat twelve-lead EKG interpreted by myself shows a continued  sinus tachycardia with a ventricular rate of 124, QRS is continued poor R wave progression and left ventricular hypertrophy.  The ST segment depression laterally is stable.  Overall no dynamic changes  RADIOLOGY/PROCEDURES  DX-CHEST-PORTABLE (1 VIEW)   Final Result         1.  Pulmonary edema and/or infiltrates are identified, which appear somewhat increased since the prior exam.   2.  Trace right pleural effusion   3.  Cardiomegaly   4.  Atherosclerosis            COURSE & MEDICAL DECISION MAKING  Pertinent Labs & Imaging studies reviewed. (See chart for details)  This a 78-year-old female who presents the emerge department vomiting over the last several days.  I suspect this is from a viral gastritis.  Her abdomen is benign therefore surgical source would be very unlikely.  The patient's metabolic panel does show significant elevation in her BUN and creatinine from her baseline I suspect she does have intravascular depletion.  However she has been unable to take her Lasix and also has evidence of heart failure.  This is concerning as I suspect the patient does have advanced heart failure.  The patient did receive a 500 cc normal saline bolus as she clinically appeared dehydrated on my initial exam.  On repeat exam she continues to be tachycardic but she does feel better after intravenous hydration.  The patient will be difficult to manage on an outpatient basis with her fluid status.  Therefore admit the patient to the hospital for further gentle hydration and possible Lasix after she is intravascularly repleted.  The patient did develop some dyspnea in route via EMS I suspect this is from her pulmonary edema.  As mentioned above the patient will require further resuscitation before she starts receiving Lasix.  At the time of admission she is resting comfortably.    FINAL IMPRESSION  1.  Vomiting suspect secondary to viral gastritis  2.  Dehydration  3.  Congestive heart failure    Disposition  The patient will be  admitted in stable condition         Electronically signed by: Leeroy Martinez, 2/23/2019 11:44 PM

## 2019-02-24 NOTE — CARE PLAN
Problem: Communication  Goal: The ability to communicate needs accurately and effectively will improve  Outcome: PROGRESSING AS EXPECTED  Pt educated on POC and medications.     Problem: Safety  Goal: Will remain free from falls  Outcome: PROGRESSING AS EXPECTED  Bedside table and call light are within reach. Bed is locked and in the lowest position. Treaded socks are on. Patient educated to call for assistance.

## 2019-02-24 NOTE — PROGRESS NOTES
Med rec completed per pt.   Antibiotics within last 30 days: No  Patient allergies have been reviewed: LALITHA

## 2019-02-24 NOTE — PROGRESS NOTES
Pt brought up to floor via gurney. Monitor placed and monitor room notified; pt able to ambulate from gurney to bed with 2 person assist, pt weak. Pt usually ambulates with a single point cane a home, cane at bedside.  POC discussed with pt; all questions answered at this time. Pt is on 2 O2, no home O2. AOx4. No complaints of pain at this time.

## 2019-02-24 NOTE — ED TRIAGE NOTES
Chief Complaint   Patient presents with   • N/V     x 'few weeks'   • Peripheral Edema     BLE       PT BIB EMS for c/o N/V. EMS reports patient sats droppiung to 86% in route. 2L 02 administered in rout )2 sats to 98% on 2L.    PT denies dizziness or weakness.  BLE +3 pittine edema with open sores noted to BLE. 4mg of zofran given in route.

## 2019-02-24 NOTE — PROGRESS NOTES
· 2 RN skin check complete with JIM Rueda.  · Devices in place silicone nasal cannula.  · Skin assessed under devices red, very slow to veda.  · Confirmed pressure ulcers found on N/A.  · New potential pressure ulcers noted on N/A.   · Ears red, very slow to veda. Sacrum red, blanching. Legs swollen, red. Skin tears on legs B/L, transparent dressing in place.   · The following interventions in place waffle bed, ear pads, mepilex on sacrum, heels floated, Q2 turns.

## 2019-02-25 PROBLEM — I50.43 ACUTE ON CHRONIC COMBINED SYSTOLIC AND DIASTOLIC CONGESTIVE HEART FAILURE (HCC): Status: ACTIVE | Noted: 2019-01-01

## 2019-02-25 PROBLEM — K55.9 SMALL BOWEL ISCHEMIA (HCC): Status: ACTIVE | Noted: 2019-01-01

## 2019-02-25 PROBLEM — Z66 DNR (DO NOT RESUSCITATE): Status: ACTIVE | Noted: 2019-01-01

## 2019-02-25 NOTE — CARE PLAN
Problem: Communication  Goal: The ability to communicate needs accurately and effectively will improve  Outcome: PROGRESSING AS EXPECTED  Discussed POC and medications with pt. Pt verbalized understanding.     Problem: Safety  Goal: Will remain free from falls  Outcome: PROGRESSING AS EXPECTED  Bedside table and call light are within reach. Bed is locked and in the lowest position. Treaded socks are on. Patient educated to call for assistance.

## 2019-02-25 NOTE — ASSESSMENT & PLAN NOTE
Severe aortic stenosis  QuantiFERON gold negative  Hospice had been planned when could be arranged, believe patient will pass in hospital

## 2019-02-25 NOTE — CONSULTS
Cardiology Consult Note:    David To  Date & Time note created:    2/25/2019   11:55 AM     Referring MD:  Dr. Rdz    Patient ID:   Name:             Tori Guajardo   YOB: 1940  Age:                 78 y.o.  female   MRN:               [id number]                                                             Chief Complaint / Reason for consult:  Nausea, vomiting, severe AS.    History of Present Illness:    This is a 78 years old woman with prior history of severe aortic stenosis, hypertension, presented to the hospital with nausea and vomiting for several days.  Patient has seen me in the outpatient setting.  She last saw me in 2015.  In the past, patient did not want to have any intervention for her severe aortic stenosis.  She understood the risk.    She does have pitting edema in her bilateral lower extremities with skin lesions.  She does have baseline shortness of breath.  No chest pain no syncopal episodes.    Review of Systems:      Constitutional: Denies fevers, Denies weight changes  Eyes: Denies changes in vision, no eye pain  Ears/Nose/Throat/Mouth: Denies nasal congestion or sore throat   Cardiovascular: no chest pain, no palpitations   Respiratory: yes shortness of breath , Denies cough  Gastrointestinal/Hepatic: Denies abdominal pain, nausea, vomiting, diarrhea, constipation or GI bleeding   Genitourinary: Denies dysuria or frequency  Musculoskeletal/Rheum: Denies  joint pain and swelling   Skin: Denies rash  Neurological: Denies headache, confusion, memory loss or focal weakness/parasthesias  Psychiatric: denies mood disorder   Endocrine: Heidi thyroid problems  Heme/Oncology/Lymph Nodes: Denies enlarged lymph nodes, denies brusing or known bleeding disorder  All other systems were reviewed and are negative (AMA/CMS criteria)                Past Medical History:   Past Medical History:   Diagnosis Date   • Aortic stenosis    • Arthritis    • Breath shortness    • Dental  disorder    • GERD (gastroesophageal reflux disease)    • Heart murmur    • Hyperlipidemia    • Hypertension    • Pain 10/7/16    lower back, hips and legs   • Restless leg syndrome     Dr. adler    • Stroke (HCC)     had in 1970s   • Urinary tract infection, site not specified      Active Hospital Problems    Diagnosis   • Chronic diastolic congestive heart failure (HCC) [I50.32]     Priority: High   • Dehydration [E86.0]   • Nausea and vomiting [R11.2]   • Hypercalcemia [E83.52]   • GERD (gastroesophageal reflux disease) [K21.9]   • Headache, migraine [G43.909]   • Essential hypertension, benign [I10]       Past Surgical History:  Past Surgical History:   Procedure Laterality Date   • CATARACT PHACO WITH IOL Left 11/8/2016    Procedure: CATARACT PHACO WITH IOL;  Surgeon: Sam Whatley M.D.;  Location: [facility];  Service:    • CATARACT PHACO WITH IOL Right 10/11/2016    Procedure: CATARACT PHACO WITH IOL;  Surgeon: Sam Whatley M.D.;  Location: [facility];  Service:    • TUBAL COAGULATION LAPAROSCOPIC BILATERAL  1970       Hospital Medications:    Current Facility-Administered Medications:   •  gabapentin (NEURONTIN) capsule 100 mg, 100 mg, Oral, TID, Erin Rdz M.D.  •  furosemide (LASIX) tablet 20 mg, 20 mg, Oral, Q DAY, David Ball M.D.  •  aspirin EC (ECOTRIN) tablet 81 mg, 81 mg, Oral, DAILY, Shay Jorge M.D., 81 mg at 02/25/19 0555  •  gemfibrozil (LOPID) tablet 600 mg, 600 mg, Oral, BID, Shay Jorge M.D., 600 mg at 02/25/19 0555  •  tizanidine (ZANAFLEX) tablet 4 mg, 4 mg, Oral, QHS PRN, Shay Jorge M.D.  •  omeprazole (PRILOSEC) capsule 20 mg, 20 mg, Oral, DAILY, Shay Jorge M.D., 20 mg at 02/25/19 0555  •  ondansetron (ZOFRAN) syringe/vial injection 4 mg, 4 mg, Intravenous, Q4HRS PRN, Shay Jorge M.D.  •  ondansetron (ZOFRAN ODT) dispertab 4 mg, 4 mg, Oral, Q4HRS PRN, Shay Jorge M.D., 4 mg at 02/24/19 0548  •  senna-docusate (PERICOLACE  or SENOKOT S) 8.6-50 MG per tablet 2 Tab, 2 Tab, Oral, BID, 2 Tab at 02/25/19 0555 **AND** polyethylene glycol/lytes (MIRALAX) PACKET 1 Packet, 1 Packet, Oral, QDAY PRN **AND** magnesium hydroxide (MILK OF MAGNESIA) suspension 30 mL, 30 mL, Oral, QDAY PRN **AND** bisacodyl (DULCOLAX) suppository 10 mg, 10 mg, Rectal, QDAY PRN, Shay Jorge M.D.  •  acetaminophen (TYLENOL) tablet 650 mg, 650 mg, Oral, Q6HRS PRN, Shay Jorge M.D., 650 mg at 02/25/19 0602    Current Outpatient Medications:  Prescriptions Prior to Admission   Medication Sig Dispense Refill Last Dose   • SUMAtriptan (IMITREX) 25 MG Tab tablet TAKE 1 TO 4 TABLETS BY MOUTH ONCE AS NEEDED FOR MIGRAINE FOR UP TO 1 DOSE 9 Tab 2 2/21/2019 at unknown   • atenolol (TENORMIN) 50 MG Tab Take 1 Tab by mouth every day. 90 Tab 3 2/23/2019 at Unknown time   • furosemide (LASIX) 20 MG Tab Take 1 Tab by mouth 2 times a day. 180 Tab 3 2/23/2019 at Unknown time   • potassium chloride ER (KLOR-CON) 10 MEQ tablet Take 1 Tab by mouth every day. 90 Tab 3 2/21/2019 at unknown   • tizanidine (ZANAFLEX) 2 MG tablet Take 2 Tabs by mouth at bedtime as needed (sleep). 90 Tab 2 2/22/2019 at unknown   • gabapentin (NEURONTIN) 600 MG tablet Take 1 Tab by mouth 3 times a day. 270 Tab 3 2/23/2019 at Unknown time   • gemfibrozil (LOPID) 600 MG Tab Take 1 Tab by mouth 2 times a day. 180 Tab 3 2/23/2019 at Unknown time   • Cyanocobalamin (VITAMIN B 12 PO) Take 1 Tab by mouth every morning.   2/23/2019 at Unknown time   • aspirin EC (ECOTRIN) 81 MG TBEC Take 81 mg by mouth every day.     2/23/2019 at Unknown time   • Cholecalciferol (VITAMIN D-3 PO) Take 1 Cap by mouth every day.     2/23/2019 at Unknown time   • Omega-3 Fatty Acids (FISH OIL PO) Take 1 Cap by mouth every day.     2/23/2019 at Unknown time   • Multiple Vitamin (MULTI-VITAMIN) TABS Take 1 Tab by mouth every day.     2/23/2019 at Unknown time   • VITAMIN A PO Take 1 Tab by mouth every day.     2/23/2019 at Unknown time        Medication Allergy:  No Known Allergies    Family History:  Family History   Problem Relation Age of Onset   • Adopted: Yes   • Cancer Daughter         Cervical CA   • Cancer Daughter         Cervial CA   • Cancer Son         Esophageal Cancer       Social History:  Social History     Social History   • Marital status:      Spouse name: N/A   • Number of children: N/A   • Years of education: N/A     Occupational History   • Not on file.     Social History Main Topics   • Smoking status: Passive Smoke Exposure - Never Smoker   • Smokeless tobacco: Never Used   • Alcohol use No   • Drug use: No   • Sexual activity: No      Comment: .      Other Topics Concern   • Not on file     Social History Narrative   • No narrative on file         Physical Exam:  Vitals/ General Appearance:   Weight/BMI: Body mass index is 21.79 kg/m².  Blood pressure 102/70, pulse 89, temperature 36.6 °C (97.9 °F), temperature source Temporal, resp. rate 14, height 1.524 m (5'), weight 50.6 kg (111 lb 8.8 oz), SpO2 99 %, not currently breastfeeding.  Vitals:    02/24/19 2010 02/25/19 0020 02/25/19 0534 02/25/19 0835   BP: (!) 92/64 (!) 86/61 (!) 87/65 102/70   Pulse: 91 85 90 89   Resp: 18 16 18 14   Temp: 35.8 °C (96.5 °F) 36.2 °C (97.2 °F) 36 °C (96.8 °F) 36.6 °C (97.9 °F)   TempSrc: Temporal Temporal Temporal Temporal   SpO2: 95% 97% 90% 99%   Weight: 50.6 kg (111 lb 8.8 oz)      Height:         Oxygen Therapy:  Pulse Oximetry: 99 %, O2 (LPM): 1, O2 Delivery: Silicone Nasal Cannula    Constitutional:   Cachectic.  HENMT:  Normocephalic, Atraumatic, Oropharynx moist mucous membranes, No oral exudates, Nose normal.  No thyromegaly.  Eyes:  EOMI, Conjunctiva normal, No discharge.  Neck:  Normal range of motion, No cervical tenderness,  no JVD.  Cardiovascular:  Normal heart rate, Normal rhythm,there is 4/6 systolic murmur heard best at the right border of the aortic valve area. The murmur does not radiate to the carotids., No  rubs, No gallops.   Extremitites with pitting edema and stasis ulcers.  Lungs:  Normal breath sounds, breath sounds clear to auscultation bilaterally,  no rales, no rhonchi, no wheezing.   Abdomen: Bowel sounds normal, Soft, No tenderness, No guarding, No rebound, No masses, No hepatosplenomegaly.  Skin: Warm, Dry, No erythema, No rash, no induration.  Neurologic: Alert & oriented x 3, No focal deficits noted, cranial nerves II through X are intact.  Psychiatric: Affect normal, Judgment normal, Mood normal.      MDM (Data Review):     Records reviewed and summarized in current documentation    Lab Data Review:  Recent Results (from the past 24 hour(s))   CBC with Differential    Collection Time: 02/25/19  2:06 AM   Result Value Ref Range    WBC 10.6 4.8 - 10.8 K/uL    RBC 4.97 4.20 - 5.40 M/uL    Hemoglobin 15.9 12.0 - 16.0 g/dL    Hematocrit 50.8 (H) 37.0 - 47.0 %    .2 (H) 81.4 - 97.8 fL    MCH 32.0 27.0 - 33.0 pg    MCHC 31.3 (L) 33.6 - 35.0 g/dL    RDW 61.1 (H) 35.9 - 50.0 fL    Platelet Count 176 164 - 446 K/uL    MPV 11.6 9.0 - 12.9 fL    Neutrophils-Polys 78.90 (H) 44.00 - 72.00 %    Lymphocytes 9.30 (L) 22.00 - 41.00 %    Monocytes 10.80 0.00 - 13.40 %    Eosinophils 0.00 0.00 - 6.90 %    Basophils 0.20 0.00 - 1.80 %    Immature Granulocytes 0.80 0.00 - 0.90 %    Nucleated RBC 0.00 /100 WBC    Neutrophils (Absolute) 8.39 (H) 2.00 - 7.15 K/uL    Lymphs (Absolute) 0.99 (L) 1.00 - 4.80 K/uL    Monos (Absolute) 1.15 (H) 0.00 - 0.85 K/uL    Eos (Absolute) 0.00 0.00 - 0.51 K/uL    Baso (Absolute) 0.02 0.00 - 0.12 K/uL    Immature Granulocytes (abs) 0.08 0.00 - 0.11 K/uL    NRBC (Absolute) 0.00 K/uL   Basic Metabolic Panel (BMP)    Collection Time: 02/25/19  2:06 AM   Result Value Ref Range    Sodium 141 135 - 145 mmol/L    Potassium 4.1 3.6 - 5.5 mmol/L    Chloride 104 96 - 112 mmol/L    Co2 21 20 - 33 mmol/L    Glucose 96 65 - 99 mg/dL    Bun 41 (H) 8 - 22 mg/dL    Creatinine 1.17 0.50 - 1.40 mg/dL     Calcium 9.3 8.5 - 10.5 mg/dL    Anion Gap 16.0 (H) 0.0 - 11.9   ESTIMATED GFR    Collection Time: 02/25/19  2:06 AM   Result Value Ref Range    GFR If  54 (A) >60 mL/min/1.73 m 2    GFR If Non African American 45 (A) >60 mL/min/1.73 m 2   BTYPE NATRIURETIC PEPTIDE    Collection Time: 02/25/19  2:06 AM   Result Value Ref Range    B Natriuretic Peptide >5000 (H) 0 - 100 pg/mL       Imaging/Procedures Review:    Chest Xray:  Reviewed    EKG:   As in HPI. Sinus tachycardia.  I personally interpreted the EKG tracing.    MDM (Assessment and Plan):     Active Hospital Problems    Diagnosis   • Chronic diastolic congestive heart failure (HCC) [I50.32]     Priority: High   • Dehydration [E86.0]   • Nausea and vomiting [R11.2]   • Hypercalcemia [E83.52]   • GERD (gastroesophageal reflux disease) [K21.9]   • Headache, migraine [G43.909]   • Essential hypertension, benign [I10]         At this point, I do not think that her nausea and vomiting are related to severe aortic stenosis.  Overall, she still does not want to have any intervention done for her severe left ear.  I discussed option of TAVR as well.  Overall, I do not think that she is a good candidate for TAVR either.  Conservative management for cardiac condition is the best option.  Palliative care consult is appropriate. Poor prognosis overall. High mortality rate within 1 year.  In the meantime, I will stop or afterload reducing agents.  Her blood pressure has been borderline.  I would discontinue atenolol.  Okay to use Lasix at low-dose for diuresis.      Will sign off at this time, please call us with further questions.  Patient will be followed in the outpatient cardiology clinic for further cardiac care.    Thank you for referring this patient to our cardiology service.      David Ball MD.   Cardiology Inpatient Service.  [facility].  [phone].  Lili [place].

## 2019-02-25 NOTE — DISCHARGE PLANNING
Received Choice form at 1520  Agency/Facility Name: [facility]  Referral sent per Choice form at 3140

## 2019-02-25 NOTE — CARE PLAN
Problem: Infection  Goal: Will remain free from infection  Outcome: PROGRESSING AS EXPECTED  Pt educated on the importance of hand washing to reduce the risk of infection. Pt verbally understands and performs hand hygiene to reduce to risks of infection.     Problem: Bowel/Gastric:  Goal: Normal bowel function is maintained or improved  Outcome: PROGRESSING AS EXPECTED  Pt educated on the use of stool softeners to aide in bowel maintenance and the importance of regular bowel movements

## 2019-02-25 NOTE — PROGRESS NOTES
· 2 RN skin check complete with JIM Chamorro.  · Devices in place O2 tubing.  · Skin assessed under devices yes.  · Confirmed pressure ulcers found on n/a. Wound consult placed and wound reported.  · The following interventions in place:    BL ears red and blanching, bridge of nose red, slow to veda (from pt's glasses). Scattered scabbing all over face. BL elbows red blanching. General scabbing and brusing on BL UE. Scabbing on spine, upper back near neck. Sacrum red, slow to veda - mepilex and waffle cushion in place. BL shins have multiple wounds around legs, pitting edema and sloughed off skin and they are weeping; mepelites in place. Heels red, boggy and blanching, floated heels by offloading with waffle mattress. Medial Right Great toe red but blanching. Scatted scabbing on feet/toes. Q2 turns in place.     Wound consult in. Photos uploaded on admit (2/24).

## 2019-02-25 NOTE — CONSULTS
"Reason for PC Consult: Advance Care Planning    Consulted by: Dr. Rdz    Assessment:  General: 79 y/o female admitted with N/V and increased SOB d/t CHF and severe AS. Pt with known history of AS and previously declined intervention per cardiology. Pt with chronic ischemic bowel, resulting in N/V. EF 25% per ECHO today. Prior ECHO was 4 years ago with a normal EF. Other PMHx significant for GERD, restless leg syndrome, HTN, HLD, and a CVA in the 1970s.    Social: Pt lives in [place] with her  Kavin. She states he is functional and can manage his ADLs but has Alzheimer's dementia; he is 70 years old. They have been  for 21 years and she has 6 living children and a sister in [place], as well as one child that has passed. Tori is independent with her ADLs at home currently.    Consults: cardiology, surgery- pt adamant about no surgical interventions    Dyspnea: Yes- d/t CHF  Last BM:  (PTA)-    Pain: No-    Depression: Mood appropriate for situation-    Dementia: No;       Spiritual:  Is Quaker or spirituality important for coping with this illness? No-    Has a  or spiritual provider visit been requested? No    Palliative Performance Scale: 40%    Advance Directive: Advance Directive-    DPOA: Yes-  Kavin  POLST: No-  completed today for DNR/comfort/no feeding tubes    Code Status: DNR- confirmed    Outcome:  PC RN met with Tori at bedside and explained the role of PC; she was laying in bed, AOx4. She expressed having a solid understanding of her medical situation and states \"I've had heart problems for a long time.\" She continues to deny wanting any surgical intervention for her bowel or heart. She wishes to return home and remain at home. When asked if she wished to return to the hospital for any further treatment/work-up, she states \"No.\" PC RN explained the role of hospice to manage her symptoms and EoL process at home and discussed their overarching goals/philosphy. " "She is in agreement with exploring this plan \"as long as I can stay in my home.\" PC RN validated this wish and noted the goal is always to keep a patient in their home. Hospice groups discussed and she wished to keep her care with [facility]. Choice form completed, as well as POLST. Both reviewed and signed by Tori. She gave this RN permission to call her sister Tejal to provide updates- [phone]. Tori denied any questions/concerns at this time.     POLST reviewed and signed by MD. Diana on the chart for time of DC and copy scanned into EMR. While talking to Tori, PC RN provided active listening, normalization, validation of thoughts and feelings, as well as reinforced her goals of care. PC contact information provided to Tori and encouraged to call with any questions or concerns.     Updated: MD/RN/KAILEE    Plan: home with hospice once accepted/arranged    Recommendations: I recommend a hospice consult.    Thank you for allowing Palliative Care to participate in this patient's care. Please feel free to call [phone] with any questions or concerns.  "

## 2019-02-25 NOTE — ASSESSMENT & PLAN NOTE
Patient initial presented with shortness of breath and weakness  Patient chest x-ray showing pulmonary edema  Patient's BNP is greater than 5000  Echocardiogram reported ejection fraction 25% with severe aortic stenosis  Patient currently does not want any invasive intervention.  Plan is for hospice, may require inpatient hospice versus SNF    Suspect worsening acute on chronic heart failure exacerbation in setting of severe aortic stenosis  Inserted Kelly for patient comfort and strict I's and O's  BNP >5k once again  Diuresis limited by hypotension  RRT called 3/2  Trial of enalaprilat x 1 for afterload reduction, severe AS and HF endless cycle of complication  Suspect end is near for patient, pt is critically ill with hypotension and tachycardia, tachypnic but lungs clear in bases  Will discuss with  regarding comfort measures

## 2019-02-25 NOTE — RESPIRATORY CARE
COPD EDUCATION by COPD CLINICAL EDUCATOR  2/25/2019 at 7:06 AM by Joan Mathis     Patient reviewed by COPD education team. Patient does not qualify for COPD program.

## 2019-02-25 NOTE — PROGRESS NOTES
[facility] Medicine Daily Progress Note    Date of Service  2/25/2019    Chief Complaint  78 y.o. female admitted 2/23/2019 with nausea vomiting and shortness of breath    Hospital Course    Past medical history of severe aortic stenosis who does not want intervention, presented with complaint of nausea vomiting and shortness of breath.  Patient was noticed to have acute on chronic CHF exacerbation due to severe aortic stenosis and reduced ejection fraction.  Cardiologist is consulted and recommend palliative and potential hospice candidate.  Patient was also noticed to have ischemic bowel probably chronic and cannot explain nausea vomiting.  Patient does not want any intervention either.  General surgery was consulted and appreciate consultation.      Interval Problem Update  2/25.  Patient still complaining of shortness of breath and weakness.  Patient complains of general body achiness. Patient's pain is general, 2-3/10, intermittent and does not radiate to other location, sharp and with some tingling. Can be controlled by pain meds. Patient otherwise denies fever, chills, adb pain,  constipation, diarrhea, cough, or sputum.      Consultants/Specialty  General surgery and cardiologist    Code Status  DNR    Disposition  To be determined    Review of Systems  Review of Systems   Constitutional: Negative for chills, fever and weight loss.   HENT: Negative for congestion, ear discharge, ear pain, hearing loss and nosebleeds.    Eyes: Negative for blurred vision and pain.   Respiratory: Positive for cough and shortness of breath. Negative for sputum production and wheezing.    Cardiovascular: Positive for leg swelling. Negative for chest pain, palpitations and PND.   Gastrointestinal: Negative for abdominal pain, constipation, diarrhea, heartburn, nausea and vomiting.   Genitourinary: Negative for dysuria, frequency and hematuria.   Musculoskeletal: Negative for back pain, falls, joint pain and neck pain.   Skin:  Negative for rash.   Neurological: Positive for weakness. Negative for dizziness, tremors, speech change, seizures and headaches.   Psychiatric/Behavioral: Negative for depression, substance abuse and suicidal ideas.        Physical Exam  Temp:  [35.8 °C (96.5 °F)-36.6 °C (97.9 °F)] 36.1 °C (97 °F)  Pulse:  [] 89  Resp:  [14-18] 16  BP: ()/(61-72) 91/62  SpO2:  [90 %-99 %] 99 %    Physical Exam   Constitutional: She is oriented to person, place, and time. She appears well-developed and well-nourished.   HENT:   Head: Normocephalic.   Nose: Nose normal.   Mouth/Throat: No oropharyngeal exudate.   Eyes: Pupils are equal, round, and reactive to light. EOM are normal.   Neck: Normal range of motion. Neck supple. No JVD present. No thyromegaly present.   Cardiovascular: Normal rate and regular rhythm.  Exam reveals no gallop and no friction rub.    Murmur heard.  Pulmonary/Chest: Effort normal. No respiratory distress. She has no wheezes. She has rales.   Abdominal: Soft. Bowel sounds are normal. She exhibits no distension and no mass. There is no tenderness. There is no rebound and no guarding.   Musculoskeletal: Normal range of motion. She exhibits edema. She exhibits no tenderness.   Lymphadenopathy:     She has no cervical adenopathy.   Neurological: She is alert and oriented to person, place, and time. No cranial nerve deficit.   Skin: Skin is warm. No rash noted.   Psychiatric: Her behavior is normal.       Fluids    Intake/Output Summary (Last 24 hours) at 02/25/19 1357  Last data filed at 02/25/19 1000   Gross per 24 hour   Intake                0 ml   Output              300 ml   Net             -300 ml       Laboratory  Recent Labs      02/23/19   2342  02/25/19   0206   WBC  8.1  10.6   RBC  5.33  4.97   HEMOGLOBIN  16.5*  15.9   HEMATOCRIT  53.5*  50.8*   MCV  100.4*  102.2*   MCH  31.0  32.0   MCHC  30.8*  31.3*   RDW  60.6*  61.1*   PLATELETCT  252  176   MPV  11.9  11.6     Recent Labs       02/23/19 2342  02/25/19   0206   SODIUM  142  141   POTASSIUM  3.9  4.1   CHLORIDE  102  104   CO2  21  21   GLUCOSE  121*  96   BUN  40*  41*   CREATININE  1.10  1.17   CALCIUM  10.7*  9.3     Recent Labs      02/23/19 2342   APTT  30.4   INR  2.29*     Recent Labs      02/23/19 2342  02/25/19   0206   BNPBTYPENAT  >5000*  >5000*           Imaging  EC-ECHOCARDIOGRAM COMPLETE W/O CONT   Final Result      [id number] VIEW   Final Result      No evidence of bowel dilatation.      Pneumatosis.      CT-ABDOMEN-PELVIS W/O   Final Result      Pneumatosis involving multiple loops of small bowel. This is worrisome for ischemia.      No portal venous gas or free intraperitoneal air is identified.      There may be wall thickening of the ascending colon and cecum which may be infectious/inflammatory.      Nonvisualization of the appendix, limiting evaluation.      Colonic diverticulosis.      Cholelithiasis. There may be mild gallbladder wall thickening. Further evaluation can be obtained with right upper quadrant ultrasound.      Prominent atherosclerotic plaque.      Small to moderate right and trace left pleural effusions.      Extensive airspace opacities bilaterally likely represent pulmonary edema. Multifocal pneumonia is not excluded.      Prominent cardiomegaly.      Findings discussed with Dr. Saba.         DX-CHEST-PORTABLE (1 VIEW)   Final Result         1.  Pulmonary edema and/or infiltrates are identified, which appear somewhat increased since the prior exam.   2.  Trace right pleural effusion   3.  Cardiomegaly   4.  Atherosclerosis           Assessment/Plan  * Acute on chronic combined systolic and diastolic congestive heart failure (HCC)- (present on admission)   Assessment & Plan    Patient presented with shortness of breath and weakness.  On physical examination patient does have significant peripheral edema and lung crackles.  Patient chest x-ray showing pulmonary edema  Patient's BNP is greater  than 5000  Echocardiogram reported ejection fraction 25% with severe aortic stenosis  I consult cardiologist.  Patient currently does not want any invasive intervention.  Cardiologist recommend palliative consult potentially hospice due to high mortality rate for the first year  I discontinued patient on atenolol and all afterload reducing medication.  Cautiously use low-dose Lasix.  I change Lasix to oral 20 mg once a day.  Patient's blood pressure borderline.     Chronic diastolic congestive heart failure (HCC)   Assessment & Plan    Unclear if acute exacerbation, versus changes due to severe dehydration in setting of chronic nausea and vomiting.  Will check echocardiogram.  Hold diuretic therapy for now as patient reports baseline respiratory status.       DNR (do not resuscitate)- (present on admission)   Assessment & Plan    Total time spent on advanced care planning, excluding time spent on daily care: 18 minutes.    1st 30 minutes [id number]     I discussed extensively with patient and patient's family is regarding code status and plan of care. Confirmed with DNR.  I also recommend patient to be on hospice.  I ordered palliative consult.       Small bowel ischemia (HCC)- (present on admission)   Assessment & Plan    CT abdomen confirmed the patient has small bowel ischemia.  However patient currently has mild abdominal pain.  Change patient diet to p.o. clear liquid.  I consult general surgery.  Patient does not want any surgery.  Appreciate general surgery consultation recommendation, conservative treatment.  High risk for mortality.       Hypercalcemia   Assessment & Plan    Suspect due to volume loss.  Monitor with fluid therapy.      Nausea and vomiting- (present on admission)   Assessment & Plan    Unclear etiology, with long-standing chronicity of 3 months reportedly.   CT abdomen confirmed the patient has small bowel ischemia.  However patient currently has mild abdominal pain.  Change patient diet to p.o.  clear liquid.  I consult general surgery.  Patient does not want any surgery.  Appreciate general surgery consultation recommendation, conservative treatment.  High risk for mortality.       Dehydration   Assessment & Plan    With elevated BUN and increased Crea from baseline, in the setting of reported history of nausea and vomiting for past 3 months.  Will hold lasix therapy, gentle hydration monitoring respiratory status.       GERD (gastroesophageal reflux disease)- (present on admission)   Assessment & Plan    Intermittently on omeprazole therapy, which may be the cause of her nausea and vomiting.   Restart omeprazole therapy.    Patient's GI symptoms likely due to small bowel chronic ischemia due to severe aortic stenosis.     Headache, migraine- (present on admission)   Assessment & Plan    No current migraine.  Monitor.      Aortic stenosis, severe- (present on admission)   Assessment & Plan    Patient presented with shortness of breath and weakness.  On physical examination patient does have significant peripheral edema and lung crackles.  Patient chest x-ray showing pulmonary edema  Patient's BNP is greater than 5000  Echocardiogram reported ejection fraction 25% with severe aortic stenosis  I consult cardiologist.  Patient currently does not want any invasive intervention.  Cardiologist recommend palliative consult potentially hospice due to high mortality rate for the first year  I discontinued patient on atenolol and all afterload reducing medication.  Cautiously use low-dose Lasix.  I change Lasix to oral 20 mg once a day.  Patient's blood pressure borderline.       Essential hypertension, benign   Assessment & Plan    Controlled with current medication regimen.  Monitor.            VTE prophylaxis: SCD      Current Facility-Administered Medications:   •  gabapentin (NEURONTIN) capsule 100 mg, 100 mg, Oral, TID, Erin Rdz M.D., 100 mg at 02/25/19 1326  •  furosemide (LASIX) tablet 20 mg, 20 mg, Oral, Q  David QUINTERO M.D., 20 mg at 02/25/19 1326  •  aspirin EC (ECOTRIN) tablet 81 mg, 81 mg, Oral, DAILY, Shay Jorge M.D., 81 mg at 02/25/19 0555  •  gemfibrozil (LOPID) tablet 600 mg, 600 mg, Oral, BID, Shay Jorge M.D., 600 mg at 02/25/19 0555  •  tizanidine (ZANAFLEX) tablet 4 mg, 4 mg, Oral, QHS PRN, Shay Jorge M.D.  •  omeprazole (PRILOSEC) capsule 20 mg, 20 mg, Oral, DAILY, Shay Jorge M.D., 20 mg at 02/25/19 0555  •  ondansetron (ZOFRAN) syringe/vial injection 4 mg, 4 mg, Intravenous, Q4HRS PRN, Shay Jorge M.D.  •  ondansetron (ZOFRAN ODT) dispertab 4 mg, 4 mg, Oral, Q4HRS PRN, Shay Jorge M.D., 4 mg at 02/24/19 0548  •  senna-docusate (PERICOLACE or SENOKOT S) 8.6-50 MG per tablet 2 Tab, 2 Tab, Oral, BID, 2 Tab at 02/25/19 0555 **AND** polyethylene glycol/lytes (MIRALAX) PACKET 1 Packet, 1 Packet, Oral, QDAY PRN **AND** magnesium hydroxide (MILK OF MAGNESIA) suspension 30 mL, 30 mL, Oral, QDAY PRN **AND** bisacodyl (DULCOLAX) suppository 10 mg, 10 mg, Rectal, QDAY PRN, Shay Jorge M.D.  •  acetaminophen (TYLENOL) tablet 650 mg, 650 mg, Oral, Q6HRS PRN, Shay Jorge M.D., 650 mg at 02/25/19 0602

## 2019-02-25 NOTE — PROGRESS NOTES
Bedside report received from NOC JIM Guerra, pt is resting in bed asleep with no sign of pain, feet elevated due to BLE edema, BP low overnight 87/65, lasix and BP meds held this morning. Bed is locked in lowest position with call light, belongings within reach, white board updated, and POC discussed. All needs met at this time.

## 2019-02-25 NOTE — PROGRESS NOTES
Assumed care of patient bedside report received from JIM Rueda updated on POC, call light within reach and fall precautions in place. Bed locked and in lowest position. Patient instructed to call for assistance before getting out of bed. All questions answered, no other needs at this time.

## 2019-02-25 NOTE — PROGRESS NOTES
Monitor summary:  SR   R PAC, R PVC  10 sec PSVT up to 186,   4 sec PSVT up to 180.   .14/.10/.34

## 2019-02-25 NOTE — ASSESSMENT & PLAN NOTE
CT abdomen confirmed the patient has small bowel ischemia.  However patient currently has mild abdominal pain.  Patient does not want any surgery.  Appreciate general surgery consultation recommendation, conservative treatment.  High risk for mortality.

## 2019-02-26 NOTE — HOSPICE
"Thank you for the referral to [facility] hospice.    I arrived to pt's  room when she was eating dinner and she didn't want to be bothered. She seemed unsure of what hospice services were and if she wanted them. She is clear that she wants to go home, but she seems very deconditioned, and she lives with her - who she reports can't take care of her ( has dementia per palliative RN note). When discussing a possible GH/SNF she refused. She just \"wants to go home\" and is interested in allowing hospice if we can get her home.     Asked if hospice RN could come back tomorrow to discuss hospice in more detaily with other family members, and pt. agreed to speak with hospice team tomorrow     [facility] hospice team will follow up with pt and case management team tomorrow.     "

## 2019-02-26 NOTE — DIETARY
Nutrition services: Day 0 of admit. Tori Guajardo is a 78 y.o. female with admitting DX of Dehydration    Consult received for poor PO / wt loss PTA / wound POA    Assessment:  Height: 152.4 cm (5')  Weight: 50.6 kg (111 lb 8.8 oz)  Body mass index is 21.79 kg/m² - underweight for age  Diet/Intake: Clear liquids, 2g sodium, 1000mL fluid restriction, PO intake % x2, 25-50% x1, <25% x1    Evaluation:   1. Pt reports that she was 180# last June but she has been having daily N/V with emesis for the past 3 months. She states that prior to 3 months ago she was losing wt d/t food insecurity. She and her  ration food at times and rely on public transportation to the [facility] to obtain food at times. Wt loss has been severe at 38.3% wt loss x8 months  2. Wound noted on admit. Consult to wound team is pending for 'BL open wounds to L and R legs'.  3. Per palliative care notes, plan is for pt to go home with hospice. Pt with ischemic bowel causing N/V and CHF with severe AS.    Malnutrition Risk: pt with severe malnutrition in the context of chronic disease related malnutrition related to CHF, aortic stenosis as evidenced by 38.3% severe wt loss x8 months, daily N/V d/t ischemic bowel and upon visual exam pt with prominent clavicles and squared shoulders.    Recommendations/Plan:  1. Diet advancement as tolerated vs nutrition support (TPN) in light of ischemic bowel. Pt likely going home with hospice so nutrition POC should be in line with what the pt desires and can tolerate.   2. Encourage intake of meals  3. Document intake of all meals as % taken in ADL's to provide interdisciplinary communication across all shifts.   4. Monitor weight.  5. Nutrition rep will continue to see patient for ongoing meal and snack preferences.   6. Referral to  for food resources for when the pt returns home.  7. Monitor for wound staging.    RD monitoring.

## 2019-02-26 NOTE — PROGRESS NOTES
Bedside report received from [facility] RN Stephani, pt is awake and alert, sitting up in bed with no report of pain. Pt states she is very hungry, diet advanced from clear liquid to full liquid. Bed is locked in lowest position with call light, belongings within reach, white board updated, and POC discussed, palliative and hospice consult yesterday, pt to go home with home hospice. All needs met at this time.

## 2019-02-26 NOTE — PROGRESS NOTES
· 2 RN skin check complete with JIM Wu.  · Devices in place glasses and silicone oxygen tubing.  · Skin assessed under devices yes.  · Confirmed pressure ulcers found on N/A.  · New potential pressure ulcers noted on yes. Wound consult placed and wound reported.  · The following interventions in place waffle mattress in place, heels floated on pillow, Q2 turns in place.    Bilateral ears red and blanching.   Bridge of nose red and blanching.   Bilateral elbows red and blanching.   Sacrum red and slow to veda. Mepilex in place. Waffle mattress in use.   Multiple wounds noted to bilateral lower extremities. Wound consult in place.   Bilateral heels dry, calloused, boggy, red, and blanching. Floated on pillow.   Skin otherwise intact.

## 2019-02-26 NOTE — HEART FAILURE PROGRAM
"Cardiovascular Nurse Navigator () Advanced Heart Failure Program Inpatient Progress Note:    Per most current notes, patient has chosen [facility] and completed a POLST with palliative care.    If patient discharges with Hospice, a seven day HF f/u appt is not indicated.     Should patient discharge without hospice, she will require a seven calendar day f/u appt which can be acquired by placing a \"schedule heart failure follow up appointment\" order per protocol or by calling the hospital schedulers at [phone].      Thank you and please call with questions or concerns.    "

## 2019-02-26 NOTE — CARE PLAN
Problem: Mobility  Goal: Risk for activity intolerance will decrease  Outcome: PROGRESSING AS EXPECTED  Pt is increasing in activity and strength, pt is able to move more independently

## 2019-02-26 NOTE — CARE PLAN
Problem: Nutritional:  Goal: Achieve adequate nutritional intake  Diet advancement as tolerated vs nutrition support (TPN) in light of ischemic bowel. Pt likely going home with hospice so nutrition POC should be in line with what the pt desires and can tolerate.  Outcome: NOT MET

## 2019-02-26 NOTE — PROGRESS NOTES
Pt tolerated AM meal. Diet was advanced to GI soft by JIM Messer per MD order. Pt currently sitting up in bed eating lunch. Pt instructed to use call button for assistance prior to getting out of bed. Pt verbalized understanding.

## 2019-02-26 NOTE — HOSPICE
Hospice services reviewed with pt. She is eager to return home with hospice support when she feels strong enough. Reviewed pt chart and meeting with Tonya MCCOY. She agreed with admitting pt to hospice if pt is a safe discharge to her home. Pt`s  has dementia, according to pt. We suggested a P.T. eval along with a wound consult for pt`s wounds on her L.E. Also, possibly wean pt to room air as she is on 1L with 98% sat. We will continue to follow pt until safe discharge is planned.

## 2019-02-26 NOTE — CARE PLAN
Problem: Communication  Goal: The ability to communicate needs accurately and effectively will improve  Outcome: PROGRESSING AS EXPECTED    Intervention: [place] patient and significant other/support system to call light to alert staff of needs   02/26/19 0112   OTHER   Oriented to: All of the Following : Location of Bathroom, Visiting Policy, Unit Routine, Call Light and Bedside Controls, Bedside Rail Policy, Smoking Policy, Rights and Responsibilities, Bedside Report, and Patient Education Notebook         Problem: Knowledge Deficit  Goal: Knowledge of disease process/condition, treatment plan, diagnostic tests, and medications will improve  Outcome: PROGRESSING AS EXPECTED  Patient educated about disease process and plan of care for the shift. Patient expressed understanding. All questions answered at this time.

## 2019-02-27 NOTE — PROGRESS NOTES
· 2 RN skin check complete with JIM Gorman.  · Devices in place O2 tubing.  · Skin assessed under devices red, blanching.  · Confirmed pressure ulcers found on NA.  · New potential pressure ulcers noted on NA. Heels red/blanching. Elbows red/blanching, ears red/blanching, left hip red/blanching, sacrum red/blanching.  · The following interventions in place waffle bed, p4xzdot, heels floated, silicone O2 tubing, barrier cream.

## 2019-02-27 NOTE — PROGRESS NOTES
Report received. Pt care assumed. Assessment performed. Pt AOx2 (disorriented to time and event), pt extremely lethargic. Notified MD in rounds. Pt laying supine in bed. Pt denies pain and no signs of distress. Bed in low, locked position. Bed alarm on. Call light within reach. Treaded socks on pt.  Hourly rounding in place.

## 2019-02-27 NOTE — WOUND TEAM
Wound care and consult done bilateral lower extremity wounds.  Discussed with staff RN and Dr. Flores need for hospice or home health nursing follow up.  Complete note to follow.

## 2019-02-27 NOTE — PROGRESS NOTES
[facility] Medicine Daily Progress Note    Date of Service  2/26/2019    Chief Complaint  78 y.o. female admitted 2/23/2019 with nausea vomiting and shortness of breath    Hospital Course    Past medical history of severe aortic stenosis who does not want intervention, presented with complaint of nausea vomiting and shortness of breath.  Patient was noticed to have acute on chronic CHF exacerbation due to severe aortic stenosis and reduced ejection fraction.  Cardiologist is consulted and recommend palliative and potential hospice candidate.  Patient was also noticed to have ischemic bowel probably chronic and cannot explain nausea vomiting.  Patient does not want any intervention either.  General surgery was consulted and appreciate consultation.      Interval Problem Update  2/26 -- Patient complains of general body achiness. Patient's pain is general, 2-3/10, intermittent and does not radiate to other location, sharp and with some tingling. Can be controlled by pain meds. Patient otherwise denies fever, chills, adb pain,  constipation, diarrhea, cough, or sputum.    Consultants/Specialty  General surgery and cardiologist    Code Status  DNR    Disposition  Home with hospice care    Review of Systems  Review of Systems   Constitutional: Negative for chills, fever and weight loss.   HENT: Negative for congestion, ear discharge, ear pain, hearing loss and nosebleeds.    Eyes: Negative for blurred vision and pain.   Respiratory: Positive for cough and shortness of breath. Negative for sputum production and wheezing.    Cardiovascular: Positive for leg swelling. Negative for chest pain, palpitations and PND.   Gastrointestinal: Negative for abdominal pain, constipation, diarrhea, heartburn, nausea and vomiting.   Genitourinary: Negative for dysuria, frequency and hematuria.   Musculoskeletal: Negative for back pain, falls, joint pain and neck pain.   Skin: Negative for rash.   Neurological: Positive for weakness.  Negative for dizziness, tremors, speech change, seizures and headaches.   Psychiatric/Behavioral: Negative for depression, substance abuse and suicidal ideas.        Physical Exam  Temp:  [35.6 °C (96 °F)-36.9 °C (98.5 °F)] 36.2 °C (97.2 °F)  Pulse:  [89-98] 98  Resp:  [16-18] 18  BP: ()/(61-70) 89/61  SpO2:  [94 %-99 %] 95 %    Physical Exam   Constitutional: She is oriented to person, place, and time. She appears well-developed and well-nourished.   HENT:   Head: Normocephalic.   Nose: Nose normal.   Mouth/Throat: No oropharyngeal exudate.   Eyes: Pupils are equal, round, and reactive to light. EOM are normal.   Neck: Normal range of motion. Neck supple. No JVD present. No thyromegaly present.   Cardiovascular: Normal rate and regular rhythm.  Exam reveals no gallop and no friction rub.    Murmur heard.  Pulmonary/Chest: Effort normal. No respiratory distress. She has no wheezes. She has rales.   Abdominal: Soft. Bowel sounds are normal. She exhibits no distension and no mass. There is no tenderness. There is no rebound and no guarding.   Musculoskeletal: Normal range of motion. She exhibits edema. She exhibits no tenderness.   Lymphadenopathy:     She has no cervical adenopathy.   Neurological: She is alert and oriented to person, place, and time. No cranial nerve deficit.   Skin: Skin is warm. No rash noted.   Psychiatric: Her behavior is normal.       Fluids    Intake/Output Summary (Last 24 hours) at 02/26/19 1621  Last data filed at 02/26/19 1000   Gross per 24 hour   Intake              340 ml   Output              150 ml   Net              190 ml       Laboratory  Recent Labs      02/23/19 2342  02/25/19   0206   WBC  8.1  10.6   RBC  5.33  4.97   HEMOGLOBIN  16.5*  15.9   HEMATOCRIT  53.5*  50.8*   MCV  100.4*  102.2*   MCH  31.0  32.0   MCHC  30.8*  31.3*   RDW  60.6*  61.1*   PLATELETCT  252  176   MPV  11.9  11.6     Recent Labs      02/23/19   2342  02/25/19   0206   SODIUM  142  141    POTASSIUM  3.9  4.1   CHLORIDE  102  104   CO2  21  21   GLUCOSE  121*  96   BUN  40*  41*   CREATININE  1.10  1.17   CALCIUM  10.7*  9.3     Recent Labs      02/23/19   2342   APTT  30.4   INR  2.29*     Recent Labs      02/23/19   2342  02/25/19   0206   BNPBTYPENAT  >5000*  >5000*           Imaging  EC-ECHOCARDIOGRAM COMPLETE W/O CONT   Final Result      [id number] VIEW   Final Result      No evidence of bowel dilatation.      Pneumatosis.      CT-ABDOMEN-PELVIS W/O   Final Result      Pneumatosis involving multiple loops of small bowel. This is worrisome for ischemia.      No portal venous gas or free intraperitoneal air is identified.      There may be wall thickening of the ascending colon and cecum which may be infectious/inflammatory.      Nonvisualization of the appendix, limiting evaluation.      Colonic diverticulosis.      Cholelithiasis. There may be mild gallbladder wall thickening. Further evaluation can be obtained with right upper quadrant ultrasound.      Prominent atherosclerotic plaque.      Small to moderate right and trace left pleural effusions.      Extensive airspace opacities bilaterally likely represent pulmonary edema. Multifocal pneumonia is not excluded.      Prominent cardiomegaly.      Findings discussed with Dr. Saba.         DX-CHEST-PORTABLE (1 VIEW)   Final Result         1.  Pulmonary edema and/or infiltrates are identified, which appear somewhat increased since the prior exam.   2.  Trace right pleural effusion   3.  Cardiomegaly   4.  Atherosclerosis           Assessment/Plan  * Acute on chronic combined systolic and diastolic congestive heart failure (HCC)- (present on admission)   Assessment & Plan    Patient presented with shortness of breath and weakness.  On physical examination patient does have significant peripheral edema and lung crackles.  Patient chest x-ray showing pulmonary edema  Patient's BNP is greater than 5000  Echocardiogram reported ejection fraction  25% with severe aortic stenosis  Patient currently does not want any invasive intervention.  Cardiologist recommend palliative consult potentially hospice due to high mortality rate for the first year  I discontinued patient on atenolol and all afterload reducing medication.  Cautiously use low-dose Lasix.   Continue Lasix to oral 20 mg once a day.  Patient's blood pressure borderline.     Chronic diastolic congestive heart failure (HCC)   Assessment & Plan    Unclear if acute exacerbation, versus changes due to severe dehydration in setting of chronic nausea and vomiting.  Will check echocardiogram.  Hold diuretic therapy for now as patient reports baseline respiratory status.       DNR (do not resuscitate)- (present on admission)   Assessment & Plan    Previous physician discussed extensively with patient and patient's family is regarding code status and plan of care. Confirmed with DNR.       Small bowel ischemia (HCC)- (present on admission)   Assessment & Plan    CT abdomen confirmed the patient has small bowel ischemia.  However patient currently has mild abdominal pain.  Patient does not want any surgery.  Appreciate general surgery consultation recommendation, conservative treatment.  High risk for mortality.       Hypercalcemia   Assessment & Plan    Suspect due to volume loss.  Monitor with fluid therapy.      Nausea and vomiting- (present on admission)   Assessment & Plan    Unclear etiology, with long-standing chronicity of 3 months reportedly.   CT abdomen confirmed the patient has small bowel ischemia.  However patient currently has mild abdominal pain.  I consult general surgery.  Patient does not want any surgery.  Appreciate general surgery consultation recommendation, conservative treatment.  High risk for mortality.  Advance diet.       Dehydration   Assessment & Plan    With elevated BUN and increased Crea from baseline, in the setting of reported history of nausea and vomiting for past 3 months.   Will hold lasix therapy, gentle hydration monitoring respiratory status.       GERD (gastroesophageal reflux disease)- (present on admission)   Assessment & Plan    Restart omeprazole therapy.    Patient's GI symptoms likely due to small bowel chronic ischemia due to severe aortic stenosis.  Okay to advance diet, patient moving towards hospice care     Headache, migraine- (present on admission)   Assessment & Plan    No current migraine.  Monitor.      Aortic stenosis, severe- (present on admission)   Assessment & Plan    Patient presented with shortness of breath and weakness.  On physical examination patient does have significant peripheral edema and lung crackles.  Patient chest x-ray showing pulmonary edema  Patient's BNP is greater than 5000  Echocardiogram reported ejection fraction 25% with severe aortic stenosis  I consult cardiologist.  Patient currently does not want any invasive intervention.  Cardiologist recommend palliative consult potentially hospice due to high mortality rate for the first year  I discontinued patient on atenolol and all afterload reducing medication.  Cautiously use low-dose Lasix.  I change Lasix to oral 20 mg once a day.  Patient's blood pressure borderline.       Essential hypertension, benign   Assessment & Plan    Controlled with current medication regimen.  Monitor.            VTE prophylaxis: SCD      Current Facility-Administered Medications:   •  gabapentin (NEURONTIN) capsule 100 mg, 100 mg, Oral, TID, Erin Rdz M.D., 100 mg at 02/26/19 1310  •  furosemide (LASIX) tablet 20 mg, 20 mg, Oral, Q DAY, David Ball M.D., 20 mg at 02/26/19 0537  •  aspirin EC (ECOTRIN) tablet 81 mg, 81 mg, Oral, DAILY, Shay Jorge M.D., 81 mg at 02/26/19 0536  •  gemfibrozil (LOPID) tablet 600 mg, 600 mg, Oral, BID, Shay Jorge M.D., 600 mg at 02/26/19 0537  •  tizanidine (ZANAFLEX) tablet 4 mg, 4 mg, Oral, QHS PRN, Shay Jorge M.D.  •  omeprazole (PRILOSEC) capsule 20 mg, 20  mg, Oral, DAILY, Shay Jorge M.D., 20 mg at 02/26/19 0536  •  ondansetron (ZOFRAN) syringe/vial injection 4 mg, 4 mg, Intravenous, Q4HRS PRN, Shay Jorge M.D.  •  ondansetron (ZOFRAN ODT) dispertab 4 mg, 4 mg, Oral, Q4HRS PRN, Shay Jorge M.D., 4 mg at 02/24/19 0548  •  senna-docusate (PERICOLACE or SENOKOT S) 8.6-50 MG per tablet 2 Tab, 2 Tab, Oral, BID, 2 Tab at 02/25/19 0555 **AND** polyethylene glycol/lytes (MIRALAX) PACKET 1 Packet, 1 Packet, Oral, QDAY PRN **AND** magnesium hydroxide (MILK OF MAGNESIA) suspension 30 mL, 30 mL, Oral, QDAY PRN **AND** bisacodyl (DULCOLAX) suppository 10 mg, 10 mg, Rectal, QDAY PRN, Shay Jorge M.D.  •  acetaminophen (TYLENOL) tablet 650 mg, 650 mg, Oral, Q6HRS PRN, Shay Jorge M.D., 650 mg at 02/26/19 0947

## 2019-02-27 NOTE — PROGRESS NOTES
Received report at 1900 at bedside. Pt A&O x 4 no C/O pain. Completed assessment. POC discussed with patient at bedside. Bed locked and low. Belongings and call light within reach.

## 2019-02-27 NOTE — PROGRESS NOTES
Pts BP down to 82/58. Pt lethargic but A&Ox4. Pt planning to be sent with home hospice. Dr. Velázquez notified.

## 2019-02-27 NOTE — THERAPY
"Physical Therapy Evaluation completed.   Bed Mobility:  Supine to Sit: Minimal Assist (HHA to pull up to sitting)  Transfers: Sit to Stand: Minimal Assist  Gait: Level Of Assist: Minimal Assist with Front-Wheel Walker       Plan of Care: Will benefit from Physical Therapy 3 times per week  Discharge Recommendations: Equipment: Will Continue to Assess for Equipment Needs. Post-acute therapy Discharge to a transitional care facility for continued skilled therapy services.    Pt admitted for nausea and vomiting for 3mo and presents with B LE weakness and ease of fatigue limiting her ability to perform bed mob, transfers, and short distance gait without min to mod A. Pt tends to hold her breath during bed mob but with cueing, was able to maintain her saturations >94% on 1.5L via nasal cannula. During 2x5' of gait with FWW, pt required manual cues for weight shift to facilitate swing limb advancement; distance limited by fatigue. While here, pt will benefit from ongoing acute PT services for mobility progression. Pt is a high fall risk and has two stairs to enter her motor home. Recommend placement at current level.     See \"Rehab Therapy-Acute\" Patient Summary Report for complete documentation.     "

## 2019-02-27 NOTE — THERAPY
OT eval attempted, discussed w/RN reports pt very lethargic. Notes indicate plan for hospice. Will remain available to eval as appropriate.     Etelvina GOLDENR/L pager #[phone] or ext [phone]

## 2019-02-27 NOTE — DISCHARGE PLANNING
Anticipated Discharge Disposition: Home with Hospice     Action: RN CM called pt spouse Mr. Kavin Guajardo ([phone]) to discuss POC.    Mr. Guajardo stated that he was here this morning to visit with the pt and has some serious concerns for being able to care for wife at home with current condition.  Mr. Guajardo lives is a mobile home off [address] with his wife.  He is in a wheelchair and states he can't afford to hire caregivers.  Income is about $1500 per month.  There is no family support for help in the area.        Barriers to Discharge: placement, financial     Plan: CM to call Hospice and discuss concerns

## 2019-02-27 NOTE — PROGRESS NOTES
2-RN Skin Check with Sathish FERNANDEZ:    · Ears are red from arm of glasses, grey foam applied   · Elbows are pink to red, turgor is loose, blanching, skin is intact.  · Stomach is clean, dry, and intact, no skin issues noted  · BLE are edematous 2+, weeping with wounds:  · Right leg has wound on the left medial side, with silver, foam, and roll gauze.   · Left leg has would on the left lateral side, with silver and adhesive foam.  · Wound consulted and treated patient, dressings to be changed Q72 hours.  · Heels are pink to red, boggy, slow to veda; heels floated on pillows.  · Sacrum is red and blanching, skin is intact over bony prominence. Pt has incontinent episodes, unable to apply mepilex. Waffle mattress and Q2 turns in place.

## 2019-02-27 NOTE — FACE TO FACE
Face to Face Supporting Documentation - Home Health    The encounter with this patient was in whole or in part the primary reason for home health admission.    Date of encounter:   Patient:                    MRN:                       YOB: 2020  Tori Guajardo  [id number]  1940     Home health to see patient for:  Skilled Nursing care for assessment, interventions & education and Wound Care    Skilled need for:  Recent Deterioration of Health Status worsening aortic stenosis     Skilled nursing interventions to include:  Wound Care    Homebound status evidenced by:  Needs the assistance of another person in order to leave the home. Leaving home requires a considerable and taxing effort. There is a normal inability to leave the home.    Community Physician to provide follow up care: Isadora Martell P.A.-C.     Optional Interventions? No      I certify the face to face encounter for this home health care referral meets the CMS requirements and the encounter/clinical assessment with the patient was, in whole, or in part, for the medical condition(s) listed above, which is the primary reason for home health care. Based on my clinical findings: the service(s) are medically necessary, support the need for home health care, and the homebound criteria are met.  I certify that this patient has had a face to face encounter by myself.  Joni Flores M.D. - NPI: [id number]

## 2019-02-28 NOTE — HOSPICE
Seen pt today sitting up in chair stated that she was able to eat oatmeal and toast and was having some lower leg pain. Dressing noted bilaterally on lower extremities and RN Jazmine stated that her legs are edemotous with weeping noted. She does have pitting edema in her bilateral lower extremities with skin lesions. She reported moderate pain in lower extremities, Liana was working on getting pain medication from MD. She does have baseline shortness of breath. No chest pain no syncopal episodes. Able to have a one person assist to bathroom. The issue is placement. Pt does Qualify for Routine hospice but unable to go home due to the  not able to manage her care at home. Pt does have several children but unclear if they are involved with her care. Per Pallative Cares note pt POLST was completed.   Updated Alesia RANGEL on floor.

## 2019-02-28 NOTE — CARE PLAN
Problem: Safety  Goal: Will remain free from falls    Intervention: Assess risk factors for falls  Fall precautions in place. Bed in lowest position. Non-skid socks in place. Personal possessions within reach. Mobility sign on door. Bed-alarm on. Call light within reach. Pt educated regarding fall prevention and states understanding.       Problem: Skin Integrity  Goal: Risk for impaired skin integrity will decrease    Intervention: Implement precautions to protect skin integrity in collaboration with the interdisciplinary team  Pt turned and positioned every two hours. Incontinence care provided and barrier paste applied as needed. Low airloss mattress ordered

## 2019-02-28 NOTE — PROGRESS NOTES
[facility] Medicine Daily Progress Note    Date of Service  2/28/2019    Chief Complaint  78 y.o. female admitted 2/23/2019 with nausea vomiting and shortness of breath    Hospital Course    Past medical history of severe aortic stenosis who does not want intervention, presented with complaint of nausea vomiting and shortness of breath.  Patient was noticed to have acute on chronic CHF exacerbation due to severe aortic stenosis and reduced ejection fraction.  Cardiologist is consulted and recommend palliative and potential hospice candidate.  Patient was also noticed to have ischemic bowel probably chronic and cannot explain nausea vomiting.  Patient does not want any intervention either.  General surgery was consulted and appreciate consultation.      Interval Problem Update  2/26 -- Patient complains of general body achiness. Patient's pain is general, 2-3/10, intermittent and does not radiate to other location, sharp and with some tingling. Can be controlled by pain meds. Patient otherwise denies fever, chills, adb pain,  constipation, diarrhea, cough, or sputum.    2/27--patient not doing as well today, yesterday we advanced her diet for patient comfort and she tolerated well, this morning patient will get more obtunded, opening eyes to voice but overall looking much worse.  Discussed with case management with regards to patient discharging home with hospice, concerned that  is unable to care for patient given limited financial resources in addition to his own medical morbidities.    2/28--better today, conversant, tolerating PO, enjoyed breakfast, no complaints of chest pain or SOB, LE wounds are painful, otherwise comfortable, discussed with CM and RN, discussed with hospice RN will obtain quatiferon for potential group home placement gvien limited family resources for hospice     Consultants/Specialty  General surgery and cardiologist    Code Status  DNR    Disposition  Home with hospice care, can  transfer to [facility]     Review of Systems  Review of Systems   Constitutional: Negative for chills and fever.   Cardiovascular: Negative for chest pain and palpitations.   Gastrointestinal: Negative for nausea and vomiting.   Musculoskeletal: Positive for joint pain.   Skin: Negative for itching and rash.   Neurological: Negative for tremors and sensory change.   All other systems reviewed and are negative.     Physical Exam  Temp:  [35.7 °C (96.2 °F)-37.2 °C (98.9 °F)] 35.9 °C (96.7 °F)  Pulse:  [] 112  Resp:  [18-24] 18  BP: ()/(69-80) 113/80  SpO2:  [95 %-98 %] 97 %    Physical Exam   Constitutional: No distress.   HENT:   Head: Normocephalic and atraumatic.   Mouth/Throat: Oropharynx is clear and moist.   Poor dentition   Eyes: Conjunctivae and EOM are normal. Right eye exhibits no discharge. Left eye exhibits no discharge. No scleral icterus.   Opens eyes to voice   Neck: Normal range of motion. Neck supple.   Cardiovascular: Normal rate, regular rhythm and intact distal pulses.    Murmur heard.  Pulmonary/Chest: Effort normal. No stridor. No respiratory distress. She has no wheezes. She has no rales.   Abdominal: Soft. Bowel sounds are normal. She exhibits no distension. There is no tenderness. There is no rebound.   Musculoskeletal: She exhibits no tenderness.   Neurological: She is alert. No cranial nerve deficit.   Lethargic throughout this morning   Skin: Skin is warm. No rash noted. She is not diaphoretic. There is erythema.   Anterior shin wounds, bandages cdi, painful to touch   Psychiatric: She has a normal mood and affect. Her behavior is normal.   Nursing note and vitals reviewed.      Fluids    Intake/Output Summary (Last 24 hours) at 02/28/19 1041  Last data filed at 02/28/19 0432   Gross per 24 hour   Intake                0 ml   Output              120 ml   Net             -120 ml       Laboratory                        Imaging  EC-ECHOCARDIOGRAM COMPLETE W/O CONT   Final Result       [id number] VIEW   Final Result      No evidence of bowel dilatation.      Pneumatosis.      CT-ABDOMEN-PELVIS W/O   Final Result      Pneumatosis involving multiple loops of small bowel. This is worrisome for ischemia.      No portal venous gas or free intraperitoneal air is identified.      There may be wall thickening of the ascending colon and cecum which may be infectious/inflammatory.      Nonvisualization of the appendix, limiting evaluation.      Colonic diverticulosis.      Cholelithiasis. There may be mild gallbladder wall thickening. Further evaluation can be obtained with right upper quadrant ultrasound.      Prominent atherosclerotic plaque.      Small to moderate right and trace left pleural effusions.      Extensive airspace opacities bilaterally likely represent pulmonary edema. Multifocal pneumonia is not excluded.      Prominent cardiomegaly.      Findings discussed with Dr. Saba.         DX-CHEST-PORTABLE (1 VIEW)   Final Result         1.  Pulmonary edema and/or infiltrates are identified, which appear somewhat increased since the prior exam.   2.  Trace right pleural effusion   3.  Cardiomegaly   4.  Atherosclerosis           Assessment/Plan  * Acute on chronic combined systolic and diastolic congestive heart failure (HCC)- (present on admission)   Assessment & Plan    Patient presented with shortness of breath and weakness  Patient chest x-ray showing pulmonary edema  Patient's BNP is greater than 5000  Echocardiogram reported ejection fraction 25% with severe aortic stenosis  Patient currently does not want any invasive intervention.  I discontinued patient on atenolol and all afterload reducing medication.  Cautiously use low-dose Lasix.   Continue Lasix to oral 20 mg once a day if possible  Plan is for hospice, may require inpatient hospice given limited outpatient resources     Chronic diastolic congestive heart failure (HCC)   Assessment & Plan    Patient now moving towards hospice.      DNR (do not resuscitate)- (present on admission)   Assessment & Plan    Previous physician discussed extensively with patient and patient's family is regarding code status and plan of care. Confirmed with DNR.       Small bowel ischemia (HCC)- (present on admission)   Assessment & Plan    CT abdomen confirmed the patient has small bowel ischemia.  However patient currently has mild abdominal pain.  Patient does not want any surgery.  Appreciate general surgery consultation recommendation, conservative treatment.  High risk for mortality.       Hypercalcemia   Assessment & Plan    Suspect due to volume loss.  Monitor with fluid therapy.      Nausea and vomiting- (present on admission)   Assessment & Plan    Unclear etiology, with long-standing chronicity of 3 months reportedly.   CT abdomen confirmed the patient has small bowel ischemia.  I consult general surgery.  Patient does not want any surgery. High risk for mortality.        Dehydration   Assessment & Plan    With elevated BUN and increased Crea from baseline, in the setting of reported history of nausea and vomiting for past 3 months.      GERD (gastroesophageal reflux disease)- (present on admission)   Assessment & Plan    Patient moving towards hospice care, once disposition more clear may give diet for patient comfort     Headache, migraine- (present on admission)   Assessment & Plan    Continue to monitor     Aortic stenosis, severe- (present on admission)   Assessment & Plan    Severe aortic stenosis  Hospice disposition being planned  Check quantiferon for potential group home placement  CM following closely     Essential hypertension, benign   Assessment & Plan    Controlled with current medication regimen.  Monitor.          VTE prophylaxis: SCD      Current Facility-Administered Medications:   •  gabapentin (NEURONTIN) capsule 100 mg, 100 mg, Oral, TID, Erin Rdz M.D., 100 mg at 02/28/19 0506  •  furosemide (LASIX) tablet 20 mg, 20 mg, Oral, Q DAY,  David Ball M.D., 20 mg at 02/28/19 0506  •  aspirin EC (ECOTRIN) tablet 81 mg, 81 mg, Oral, DAILY, Shay Jorge M.D., 81 mg at 02/28/19 0506  •  gemfibrozil (LOPID) tablet 600 mg, 600 mg, Oral, BID, Shay Jorge M.D., 600 mg at 02/28/19 0506  •  tizanidine (ZANAFLEX) tablet 4 mg, 4 mg, Oral, QHS PRN, Shay Jorge M.D.  •  omeprazole (PRILOSEC) capsule 20 mg, 20 mg, Oral, DAILY, Shay Jorge M.D., 20 mg at 02/28/19 0506  •  ondansetron (ZOFRAN) syringe/vial injection 4 mg, 4 mg, Intravenous, Q4HRS PRN, Shay Jorge M.D.  •  ondansetron (ZOFRAN ODT) dispertab 4 mg, 4 mg, Oral, Q4HRS PRN, Shay Jorge M.D., 4 mg at 02/24/19 0548  •  senna-docusate (PERICOLACE or SENOKOT S) 8.6-50 MG per tablet 2 Tab, 2 Tab, Oral, BID, Stopped at 02/26/19 1750 **AND** polyethylene glycol/lytes (MIRALAX) PACKET 1 Packet, 1 Packet, Oral, QDAY PRN **AND** magnesium hydroxide (MILK OF MAGNESIA) suspension 30 mL, 30 mL, Oral, QDAY PRN **AND** bisacodyl (DULCOLAX) suppository 10 mg, 10 mg, Rectal, QDAY PRN, Shay Jorge M.D.  •  acetaminophen (TYLENOL) tablet 650 mg, 650 mg, Oral, Q6HRS PRN, Shay Jorge M.D., 650 mg at 02/28/19 1002

## 2019-02-28 NOTE — CARE PLAN
Problem: Nutritional:  Goal: Achieve adequate nutritional intake  Diet advancement as tolerated vs nutrition support (TPN) in light of ischemic bowel. Pt likely going home with hospice so nutrition POC should be in line with what the pt desires and can tolerate.    Outcome: PROGRESSING AS EXPECTED  Pt with PO intake variable. Since advancement to Full liquid diet and now recently upgraded to Low Fiber GI Soft 2/26 for comfort (per MD note), PO intake % x 6 and <50% x 3. Per Palliative Care note on 2/25, Pt POLST was completed and pt selected DNR/Comfort/No feeding tubes.     RD to continue to monitor for POC noting prior plan to discharge home with hospice, though at present MD concerned pt's  unable to care for pt at home.

## 2019-02-28 NOTE — PROGRESS NOTES
· 2 RN skin check complete with JIM Devine.  ·   · The following interventions in place low air loss mattress, grey foam to oxygen tubing, float boots in place, Q2 hour turns    Bilat Ears: red, blanchable, Pressure to scalp above ears   Bilat Elbows: red, slow to veda  Sacrum: red, slow to veda  Bilat Heels: red, blanchable    Multiple open areas to BLE, dressing change completed

## 2019-02-28 NOTE — PROGRESS NOTES
2 RN skin check complete with JIM Pollock    · Devices in place NC, glasses  · Skin assessed under devices yes  · Confirmed wounds BLLE. Multiple on L/R. See pictures/LDAs  · New potential pressure ulcers noted on BL head behind ears where glasses apply pressure.  · Wound consult: yes  · Wound reported and pictures uploaded: yes    Scattered bruising and scabbing  BL head behind ears where glasses apply pressure red and nonblanching. Glasses removed  Left posterior calf small open wound  Left heel red and slow to nonblanching with possible DTI  Left shin wound. Biatain in place with roll gauze wrap  Right shin/lateral shin/medial shin multiple open wounds. Multiple biatain dressings placed with roll gauze wrap  Sacrum discolored and slow to veda    See pictures for added description    · The following interventions in place: low airloss mattress placed, no mepilex due to incontinence, q2 turning, feet/heels floated/elecated, dressings changed per wound orders

## 2019-02-28 NOTE — CARE PLAN
Problem: Safety  Goal: Will remain free from falls  Outcome: PROGRESSING AS EXPECTED  Fall risk assessed, and in place.  Patient is 2 person assist with ambulation.  Patient educated not to get up with out assistance, reinforcement also given. Bed alarm in place and on.  Call light with in reach.        Problem: Pain Management  Goal: Pain level will decrease to patient's comfort goal  Outcome: PROGRESSING AS EXPECTED  Monitor pain per protocol. Medicated per MD orders.

## 2019-02-28 NOTE — DISCHARGE PLANNING
Pt status was discussed in IDT rounds with concerns of deterioration and symptom management.     RN ANA LILIA spoke with hospice about pt being assessed for GIP.    [facility] will assess 2/28/19

## 2019-02-28 NOTE — PROGRESS NOTES
Hospital Medicine Daily Progress Note    Date of Service  2/27/2019    Chief Complaint  78 y.o. female admitted 2/23/2019 with nausea vomiting and shortness of breath    Hospital Course    Past medical history of severe aortic stenosis who does not want intervention, presented with complaint of nausea vomiting and shortness of breath.  Patient was noticed to have acute on chronic CHF exacerbation due to severe aortic stenosis and reduced ejection fraction.  Cardiologist is consulted and recommend palliative and potential hospice candidate.  Patient was also noticed to have ischemic bowel probably chronic and cannot explain nausea vomiting.  Patient does not want any intervention either.  General surgery was consulted and appreciate consultation.      Interval Problem Update  2/26 -- Patient complains of general body achiness. Patient's pain is general, 2-3/10, intermittent and does not radiate to other location, sharp and with some tingling. Can be controlled by pain meds. Patient otherwise denies fever, chills, adb pain,  constipation, diarrhea, cough, or sputum.    2/27--patient not doing as well today, yesterday we advanced her diet for patient comfort and she tolerated well, this morning patient will get more obtunded, opening eyes to voice but overall looking much worse.  Discussed with case management with regards to patient discharging home with hospice, concerned that  is unable to care for patient given limited financial resources in addition to his own medical morbidities.    Consultants/Specialty  General surgery and cardiologist    Code Status  DNR    Disposition  Home with hospice care    Review of Systems  Review of Systems   Unable to perform ROS: Acuity of condition        Physical Exam  Temp:  [35.6 °C (96.1 °F)-36.8 °C (98.3 °F)] 36.8 °C (98.3 °F)  Pulse:  [] 115  Resp:  [15-20] 18  BP: ()/(58-77) 89/69  SpO2:  [95 %-100 %] 95 %    Physical Exam   Constitutional: No distress.    HENT:   Head: Normocephalic and atraumatic.   Nose: Nose normal.   Mouth/Throat: Oropharynx is clear and moist.   Eyes: No scleral icterus.   Opens eyes to voice   Neck: Normal range of motion.   Cardiovascular: Normal rate and regular rhythm.  Exam reveals no gallop and no friction rub.    Murmur heard.  Pulmonary/Chest: Effort normal. No stridor. No respiratory distress. She has no wheezes. She has rales.   Abdominal: Soft. She exhibits no distension. There is no tenderness.   Musculoskeletal: She exhibits edema. She exhibits no tenderness.   Neurological: She is alert.   Lethargic throughout this morning   Skin: Skin is warm. No rash noted. She is not diaphoretic. No erythema.       Fluids    Intake/Output Summary (Last 24 hours) at 02/27/19 1829  Last data filed at 02/26/19 2019   Gross per 24 hour   Intake              200 ml   Output              200 ml   Net                0 ml       Laboratory  Recent Labs      02/25/19   0206   WBC  10.6   RBC  4.97   HEMOGLOBIN  15.9   HEMATOCRIT  50.8*   MCV  102.2*   MCH  32.0   MCHC  31.3*   RDW  61.1*   PLATELETCT  176   MPV  11.6     Recent Labs      02/25/19   0206   SODIUM  141   POTASSIUM  4.1   CHLORIDE  104   CO2  21   GLUCOSE  96   BUN  41*   CREATININE  1.17   CALCIUM  9.3         Recent Labs      02/25/19   0206   BNPBTYPENAT  >5000*           Imaging  EC-ECHOCARDIOGRAM COMPLETE W/O CONT   Final Result      [id number] VIEW   Final Result      No evidence of bowel dilatation.      Pneumatosis.      CT-ABDOMEN-PELVIS W/O   Final Result      Pneumatosis involving multiple loops of small bowel. This is worrisome for ischemia.      No portal venous gas or free intraperitoneal air is identified.      There may be wall thickening of the ascending colon and cecum which may be infectious/inflammatory.      Nonvisualization of the appendix, limiting evaluation.      Colonic diverticulosis.      Cholelithiasis. There may be mild gallbladder wall thickening. Further  evaluation can be obtained with right upper quadrant ultrasound.      Prominent atherosclerotic plaque.      Small to moderate right and trace left pleural effusions.      Extensive airspace opacities bilaterally likely represent pulmonary edema. Multifocal pneumonia is not excluded.      Prominent cardiomegaly.      Findings discussed with Dr. Saba.         DX-CHEST-PORTABLE (1 VIEW)   Final Result         1.  Pulmonary edema and/or infiltrates are identified, which appear somewhat increased since the prior exam.   2.  Trace right pleural effusion   3.  Cardiomegaly   4.  Atherosclerosis           Assessment/Plan  * Acute on chronic combined systolic and diastolic congestive heart failure (HCC)- (present on admission)   Assessment & Plan    Patient presented with shortness of breath and weakness  Patient chest x-ray showing pulmonary edema  Patient's BNP is greater than 5000  Echocardiogram reported ejection fraction 25% with severe aortic stenosis  Patient currently does not want any invasive intervention.  I discontinued patient on atenolol and all afterload reducing medication.  Cautiously use low-dose Lasix.   Continue Lasix to oral 20 mg once a day if possible  Plan is for hospice, may require inpatient hospice given limited outpatient resources     Chronic diastolic congestive heart failure (HCC)   Assessment & Plan    Patient now moving towards hospice.     DNR (do not resuscitate)- (present on admission)   Assessment & Plan    Previous physician discussed extensively with patient and patient's family is regarding code status and plan of care. Confirmed with DNR.       Small bowel ischemia (HCC)- (present on admission)   Assessment & Plan    CT abdomen confirmed the patient has small bowel ischemia.  However patient currently has mild abdominal pain.  Patient does not want any surgery.  Appreciate general surgery consultation recommendation, conservative treatment.  High risk for mortality.        Hypercalcemia   Assessment & Plan    Suspect due to volume loss.  Monitor with fluid therapy.      Nausea and vomiting- (present on admission)   Assessment & Plan    Unclear etiology, with long-standing chronicity of 3 months reportedly.   CT abdomen confirmed the patient has small bowel ischemia.  I consult general surgery.  Patient does not want any surgery. High risk for mortality.        Dehydration   Assessment & Plan    With elevated BUN and increased Crea from baseline, in the setting of reported history of nausea and vomiting for past 3 months.      GERD (gastroesophageal reflux disease)- (present on admission)   Assessment & Plan    Patient moving towards hospice care, once disposition more clear may give diet for patient comfort     Headache, migraine- (present on admission)   Assessment & Plan    Continue to monitor     Aortic stenosis, severe- (present on admission)   Assessment & Plan    Severe aortic stenosis  Hospice disposition being planned       Essential hypertension, benign   Assessment & Plan    Controlled with current medication regimen.  Monitor.            VTE prophylaxis: SCD      Current Facility-Administered Medications:   •  gabapentin (NEURONTIN) capsule 100 mg, 100 mg, Oral, TID, Erin Rdz M.D., 100 mg at 02/27/19 1737  •  furosemide (LASIX) tablet 20 mg, 20 mg, Oral, Q DAY, David Ball M.D., 20 mg at 02/27/19 0548  •  aspirin EC (ECOTRIN) tablet 81 mg, 81 mg, Oral, DAILY, Shay Jorge M.D., 81 mg at 02/27/19 0548  •  gemfibrozil (LOPID) tablet 600 mg, 600 mg, Oral, BID, Shay Jorge M.D., 600 mg at 02/27/19 1737  •  tizanidine (ZANAFLEX) tablet 4 mg, 4 mg, Oral, QHS PRN, Shay Jorge M.D.  •  omeprazole (PRILOSEC) capsule 20 mg, 20 mg, Oral, DAILY, Shay Jorge M.D., 20 mg at 02/27/19 0548  •  ondansetron (ZOFRAN) syringe/vial injection 4 mg, 4 mg, Intravenous, Q4HRS PRN, Shay Jorge M.D.  •  ondansetron (ZOFRAN ODT) dispertab 4 mg, 4 mg, Oral, Q4HRS PRN,  Shay Jorge M.D., 4 mg at 02/24/19 0548  •  senna-docusate (PERICOLACE or SENOKOT S) 8.6-50 MG per tablet 2 Tab, 2 Tab, Oral, BID, Stopped at 02/26/19 1750 **AND** polyethylene glycol/lytes (MIRALAX) PACKET 1 Packet, 1 Packet, Oral, QDAY PRN **AND** magnesium hydroxide (MILK OF MAGNESIA) suspension 30 mL, 30 mL, Oral, QDAY PRN **AND** bisacodyl (DULCOLAX) suppository 10 mg, 10 mg, Rectal, QDAY PRN, Shay Jorge M.D.  •  acetaminophen (TYLENOL) tablet 650 mg, 650 mg, Oral, Q6HRS PRN, Shay Jorge M.D., 650 mg at 02/27/19 1737    I have reviewed the chart, notes, vitals, labs/imaging and ordered labs in evaluation of Tori Guajardo for Acute on chronic combined systolic and diastolic congestive heart failure (HCC). Medical decision making is therefore complex based on severe aortic stenosis.     36 minutes were devoted to counseling and coordinating care including review of records, pertinent lab data and studies, as well as discussing diagnostic evaluation and work up, planned therapeutic interventions and future disposition of care. Where indicated, the assessment and plan reflect discussion of patient with consultants, other healthcare providers, family members, and additional research needed to obtain further information in formulating the plan of care for Tori Guajardo. Time spent from 8 AM through 8:36 AM.

## 2019-02-28 NOTE — DOCUMENTATION QUERY
[facility]                                                                         Query Response Note      PATIENT:               GEORGE REBOLLEDO  ACCT #:                  [id number]  MRN:                       [id number]  :                       1940  ADMIT DATE:       2019 11:26 PM  DISCH DATE:          RESPONDING  PROVIDER #:        [id number]           RESPONSE TEXT:    Acute on Chronic combined systolic and diastolic congestive heart failure    QUERY TEXT:    Conflicting Documentation Clarification [email or web address]    There is conflicting documentation in the medical record.      1) Acute on chronic systolic and diastolic congestive heart failure is documented in the progress notes.    2) Chronic diastolic congestive heart failure is documented in the progress notes.    Can the type and acuity of the patients congestive heart failure be clarified?     Note: If an appropriate response is not listed below, please respond with a new note      The patient's Clinical Indicators include:  Shortness of breath, peripheral edema, lung crackles, pulmonary edema   Echo: EF 25%, severely reduced left ventricular systolic function.  Grade II diastolic dysfunction   BNP >5000, . BNP > 5000   CXR: pulmonary edema and/or infiltrates, trace right pleural effusion, cardiomegaly  Risk Factors: htn, age, aortic stenosis   Treatment: Lasix, atenolol  Query created by: Kanu Monique on 2019 8:24 AM       RESPONSE TEXT:    Agree with Registered Dietician assessment of Severe Malnutrition    QUERY TEXT:    Malnutrition Severity     Severe malnutrition is documented in the  Dietary Progress Note.   Can you clarify if you agree with this assessment?     NOTE:  If an appropriate response is not listed below, please respond with a new note.    The patient's Clinical Indicators include:  Per  Dietary Progress Note:  severe malnutrition in the context of chronic disease related malnutrition related to CHF, aortic stenosis as evidenced by 38.3% severe wt loss x 8months, daily n/v d/t ischemic bowel and upon visual exam pt w/ prominent clavicles and squared shoulders.  Ht 5', wt 50.6kg, BMI 21.79  dehydration  Risk Factors: daily n/v w/ emesis for the past 3 months, and prior weight loss d/t food insecurity, GERD, small bowel ischemia   Treatment: registered dietician assessment, advance diet as tolerated vs TPN, encourage intake, monitor weight  Query created by: Kanu Monique on 2/27/2019 8:34 AM       RESPONSE TEXT:    Acute respiratory failure with hypoxia    QUERY TEXT:    Respiratory Failure Acuity and Type [id number]    Shortness of breath is documented in the medical record. Can a diagnosis be provided to support this finding?(includes suspected or probable)    NOTE:  If an appropriate response is not listed below, please respond with a new note.    The patient's Clinical Indicators include:  shortness of breath, CHF, cough   CXR: pulmonary edema and/or infiltrates are identified, trace right pleural effusion  Per Vitals Flowsheet: 2/23 97% 2L  Risk Factors: acute on chronic CHF   Treatment: supplemental 02  Query created by: Kanu Monique on 2/27/2019 8:41 AM        Electronically signed by:  BISMARK ADAMS MD 2/27/2019 7:20 PM

## 2019-02-28 NOTE — PROGRESS NOTES
Assumed care from Bryce FERNANDEZ. Received bedside report.  Updated patient on daily plan of care on white board. Patient denies any additional needs at this time.  Patient belongings and call light with in reach.  Vitals stable. Bed alarm on and working appropriately.  Will continue to monitor.

## 2019-03-01 NOTE — PROGRESS NOTES
Change in patient condition due to: Cardiac  Rapid Response called at: 2115  Physician Dr. Nichelle Forde notified at 2200    See Code Blue timeline for rapid response events. Patient Transferred to Higher Level of Care.    Received patient up on chair, no c/o pain or SOB. 2120,  Patient noted to have increased pulse 120 - 135 and RR of 30 after she was put to bed. Patient denies pain, remained alert and oriented. Notified charge nurse and Rapid Response. Notified On call Hopitalist Dr. GASTON Forde, received order for patient to be transferred to higher level of care for monitoring.

## 2019-03-01 NOTE — CARE PLAN
Problem: Communication  Goal: The ability to communicate needs accurately and effectively will improve  Outcome: PROGRESSING AS EXPECTED      Problem: Knowledge Deficit  Goal: Knowledge of disease process/condition, treatment plan, diagnostic tests, and medications will improve  Outcome: PROGRESSING AS EXPECTED

## 2019-03-01 NOTE — PROGRESS NOTES
Assumed care from Hao FERNANDEZ. Received bedside report.  Updated patient on daily plan of care on white board. Patient denies any additional needs at this time.  Patient belongings and call light with in reach.  Vitals stable. Bed alarm on and working appropriately.  Will continue to monitor.

## 2019-03-01 NOTE — PROGRESS NOTES
Gave report to Anh FERNANDEZ.Patient transferred from room T834-2 to S620-2, via hospital bed.  All medication, personal belongings and chart with patient to new room, along with FWW.    Report given to Anh.

## 2019-03-01 NOTE — PROGRESS NOTES
Patient arrived on unit, unit procedures explained, discussed plan of care and all needs met at this time. Patient is alert and oriented x 4, denies any pain at this time. Assessment completed, safety measures in place. Call light within reach. Will continue to monitor.

## 2019-03-01 NOTE — PROGRESS NOTES
Hospital Medicine Daily Progress Note    Date of Service  3/1/2019    Chief Complaint  78 y.o. female admitted 2/23/2019 with nausea vomiting and shortness of breath    Hospital Course    Past medical history of severe aortic stenosis who does not want intervention, presented with complaint of nausea vomiting and shortness of breath.  Patient was noticed to have acute on chronic CHF exacerbation due to severe aortic stenosis and reduced ejection fraction.  Cardiologist is consulted and recommend palliative and potential hospice candidate.  Patient was also noticed to have ischemic bowel probably chronic and cannot explain nausea vomiting.  Patient does not want any intervention either.  General surgery was consulted and appreciate consultation.  Transferred to medical floor yesterday, rapid response team called overnight for tachypnea and tachycardia.      Interval Problem Update  3/1--patient returns back to telemetry floor after CODE BLUE called for rapid heart rate events, tachypneic into the 50s and heart rate into 130s.  Patient transferred initially as she had been stable with severe aortic stenosis and heart failure but plan was for eventual hospice care.  There have been some social barriers with family keeping her from being discharged to hospice.    Patient more pleasant this morning, talkative, no acute complaints of chest pain or palpitations.  Heart rate in 120s on exam.     Consultants/Specialty  General surgery and cardiologist -signed off    Code Status  DNR    Disposition  Working on hospice disposition    Review of Systems  Review of Systems   Constitutional: Negative for chills and fever.   Cardiovascular: Negative for chest pain and palpitations.   Gastrointestinal: Negative for nausea and vomiting.   Musculoskeletal: Positive for joint pain.   Skin: Negative for itching and rash.   Neurological: Negative for tremors and sensory change.   All other systems reviewed and are negative.     Physical  Exam  Temp:  [35.7 °C (96.3 °F)-37.1 °C (98.8 °F)] 37.1 °C (98.8 °F)  Pulse:  [] 137  Resp:  [18-52] 42  BP: (110-130)/(84-98) 127/91  SpO2:  [89 %-98 %] 97 %    Physical Exam   Constitutional: No distress.   HENT:   Head: Normocephalic and atraumatic.   Mouth/Throat: Oropharynx is clear and moist.   Poor dentition   Eyes: Conjunctivae and EOM are normal. Right eye exhibits no discharge. Left eye exhibits no discharge. No scleral icterus.   Opens eyes to voice   Neck: Normal range of motion. Neck supple.   Cardiovascular: Normal rate, regular rhythm and intact distal pulses.    Murmur heard.  Pulmonary/Chest: Effort normal. No stridor. No respiratory distress. She has no wheezes. She has no rales.   Abdominal: Soft. Bowel sounds are normal. She exhibits no distension. There is no tenderness. There is no rebound.   Musculoskeletal: She exhibits no tenderness.   Neurological: She is alert. No cranial nerve deficit.   Lethargic throughout this morning   Skin: Skin is warm. No rash noted. She is not diaphoretic. There is erythema.   Anterior shin wounds, bandages cdi, painful to touch   Psychiatric: She has a normal mood and affect. Her behavior is normal.   Nursing note and vitals reviewed.    Fluids    Intake/Output Summary (Last 24 hours) at 03/01/19 1513  Last data filed at 03/01/19 1500   Gross per 24 hour   Intake              480 ml   Output              350 ml   Net              130 ml       Laboratory                        Imaging  DX-CHEST-PORTABLE (1 VIEW)   Final Result      Stable chest with cardiac silhouette enlargement, pulmonary edema and possible layering effusions. Superimposed pneumonia or aspiration is not excluded      EC-ECHOCARDIOGRAM COMPLETE W/O CONT   Final Result      DX-DPHYZUN-8 VIEW   Final Result      No evidence of bowel dilatation.      Pneumatosis.      CT-ABDOMEN-PELVIS W/O   Final Result      Pneumatosis involving multiple loops of small bowel. This is worrisome for ischemia.       No portal venous gas or free intraperitoneal air is identified.      There may be wall thickening of the ascending colon and cecum which may be infectious/inflammatory.      Nonvisualization of the appendix, limiting evaluation.      Colonic diverticulosis.      Cholelithiasis. There may be mild gallbladder wall thickening. Further evaluation can be obtained with right upper quadrant ultrasound.      Prominent atherosclerotic plaque.      Small to moderate right and trace left pleural effusions.      Extensive airspace opacities bilaterally likely represent pulmonary edema. Multifocal pneumonia is not excluded.      Prominent cardiomegaly.      Findings discussed with Dr. Saba.         DX-CHEST-PORTABLE (1 VIEW)   Final Result         1.  Pulmonary edema and/or infiltrates are identified, which appear somewhat increased since the prior exam.   2.  Trace right pleural effusion   3.  Cardiomegaly   4.  Atherosclerosis           Assessment/Plan  * Acute on chronic combined systolic and diastolic congestive heart failure (HCC)- (present on admission)   Assessment & Plan    Patient initial presented with shortness of breath and weakness  Patient chest x-ray showing pulmonary edema  Patient's BNP is greater than 5000  Echocardiogram reported ejection fraction 25% with severe aortic stenosis  Patient currently does not want any invasive intervention.  Plan is for hospice, may require inpatient hospice versus SNF    Suspect worsening acute on chronic heart failure exacerbation in setting of severe aortic stenosis  Insert Kelly for patient comfort and strict I's and O's  Once catheter placed, consider Lasix drip versus 3 times daily dosing for aggressive diuresis  Diuresis may be limited by hypotension  Check CBC BMP and BNP     DNR (do not resuscitate)- (present on admission)   Assessment & Plan    Previous physician discussed extensively with patient and patient's family is regarding code status and plan of care.  Confirmed with DNR.       Chronic diastolic congestive heart failure (HCC)   Assessment & Plan    Patient now moving towards hospice.     Aortic stenosis, severe- (present on admission)   Assessment & Plan    Severe aortic stenosis  Hospice disposition being planned  QuantiFERON gold negative  CM following closely     Small bowel ischemia (HCC)- (present on admission)   Assessment & Plan    CT abdomen confirmed the patient has small bowel ischemia.  However patient currently has mild abdominal pain.  Patient does not want any surgery.  Appreciate general surgery consultation recommendation, conservative treatment.  High risk for mortality.       Hypercalcemia   Assessment & Plan    Suspect due to volume loss.  Monitor with fluid therapy.      Nausea and vomiting- (present on admission)   Assessment & Plan    Unclear etiology, with long-standing chronicity of 3 months reportedly.   CT abdomen confirmed the patient has small bowel ischemia.  I consult general surgery.  Patient does not want any surgery. High risk for mortality.        Dehydration   Assessment & Plan    With elevated BUN and increased Crea from baseline, in the setting of reported history of nausea and vomiting for past 3 months.      GERD (gastroesophageal reflux disease)- (present on admission)   Assessment & Plan    Patient moving towards hospice care, once disposition more clear may give diet for patient comfort     Essential hypertension, benign   Assessment & Plan    Controlled with current medication regimen.  Monitor.       Headache, migraine- (present on admission)   Assessment & Plan    Continue to monitor, seems to have resolved        VTE prophylaxis: SCD      Current Facility-Administered Medications:   •  gabapentin (NEURONTIN) capsule 100 mg, 100 mg, Oral, TID, Erin Rdz M.D., 100 mg at 03/01/19 1207  •  furosemide (LASIX) tablet 20 mg, 20 mg, Oral, Q DAY, David Ball M.D., 20 mg at 03/01/19 0520  •  aspirin EC (ECOTRIN) tablet 81  mg, 81 mg, Oral, DAILY, Shay Jorge M.D., 81 mg at 03/01/19 0520  •  gemfibrozil (LOPID) tablet 600 mg, 600 mg, Oral, BID, Shay Jorge M.D., 600 mg at 03/01/19 0520  •  tizanidine (ZANAFLEX) tablet 4 mg, 4 mg, Oral, QHS PRN, Shay Jorge M.D.  •  omeprazole (PRILOSEC) capsule 20 mg, 20 mg, Oral, DAILY, Shay Jorge M.D., 20 mg at 03/01/19 0520  •  ondansetron (ZOFRAN) syringe/vial injection 4 mg, 4 mg, Intravenous, Q4HRS PRN, Shay oJrge M.D.  •  ondansetron (ZOFRAN ODT) dispertab 4 mg, 4 mg, Oral, Q4HRS PRN, Shay Jorge M.D., 4 mg at 02/24/19 0548  •  senna-docusate (PERICOLACE or SENOKOT S) 8.6-50 MG per tablet 2 Tab, 2 Tab, Oral, BID, 2 Tab at 03/01/19 0520 **AND** polyethylene glycol/lytes (MIRALAX) PACKET 1 Packet, 1 Packet, Oral, QDAY PRN **AND** magnesium hydroxide (MILK OF MAGNESIA) suspension 30 mL, 30 mL, Oral, QDAY PRN **AND** bisacodyl (DULCOLAX) suppository 10 mg, 10 mg, Rectal, QDAY PRN, Shay Jorge M.D.  •  acetaminophen (TYLENOL) tablet 650 mg, 650 mg, Oral, Q6HRS PRN, Shay Jorge M.D., 650 mg at 02/28/19 1002    <<[id number]>>  Additional direct face-to-face time exceeded 35 minutes in this patient after transfer back to her floor a rapid response tachypnea and tachycardia. Patient required very close monitoring and essentially critical care for management of delicate volume status, despite patient being DNR/DNI and hospice candidate. Additional time spent this morning from 8am through 835am.

## 2019-03-01 NOTE — DISCHARGE PLANNING
KAILEE spoke to JIM Styles with [facility].  Pt does not qualify for [facility].     KAILEE met with pt at bedside to discuss outside support.  Pt stated that she has four children that all reside in [place].  She is uncertain where her spouse's children are.  Pt reports that there are no other family/friends that are supportive of them.  Pt would like to go home with her spouse, however will need in home support.     KAILEE emailed [facility] to assess pt for Medicaid, then a referral for the Medicaid [facility] waiver can done.

## 2019-03-01 NOTE — CARE PLAN
Problem: Safety  Goal: Will remain free from falls  Fall risk assessed, and in place.  Patient requires assistance of  For ambulating.  Patient educated not to get up with out assistance.  Patient verbalized understanding.  Bed alarm in place and on.  Call light with in reach.        Problem: Infection  Goal: Will remain free from infection  Outcome: PROGRESSING AS EXPECTED  Educated patient on hand washing. Standard precautions in place. Assessed patient for s/s of infection.

## 2019-03-01 NOTE — PROGRESS NOTES
· 2 RN skin check complete with JIM De La Cruz.  · Devices in place BLE dressing, oxygen tubing.  · Skin assessed under devices: yes.  · The following interventions in place low air loss mattress, silicone O2 tubing offloaded, float boots in place, dressing for prevention to R elbow, Q 2 hour turns, barrier paste to sacrum.     Bilat Ears: red, blanching  Bilat Elbows: red, slow to veda, foam in place to right elbow  Sacrum: Red, slow to veda  Bilat Heels: boggy, red, blanchable      New Sacrum photo uploaded

## 2019-03-01 NOTE — PROGRESS NOTES
Late entry:  Received patient up on chair, no c/o pain or SOB. 2120,  Patient noted to have increased pulse 120 - 135 and RR of 30 after she was put to bed. Patient denies pain, remained alert and oriented. Notified charge nurse and Rapid Response. Notified On call Hopitalist Dr. GASTON Forde, received order for patient to be transferred to higher level of care for monitoring.

## 2019-03-01 NOTE — PROGRESS NOTES
· 2 RN skin check complete with JIM Nguyễn.  · Devices in place BL LE dressings, silicone oxygen tubing, heel float boots.  · Skin assessed under devices Yes.  · Confirmed wounds found on BL LE  · New potential pressure ulcers noted on NA. Wound consult placed and wound reported.    BL ears red and blanching, tubing off loaded, cheek pads in place.  Elbows red and slow to veda BL, Adhesive foam in place.  Sacrum Red and slow to veda, pt is on Q2H turning schedule barrier cream in use.   Wounds to BL LE with silver and dry roll gauze dressing in place. Wounds are weeping. Gauze was changed and is CDI.  Heels slightly boggy. PT refused to continue to wear float boots. Agreed to have heels floated on pillow. Pt is on a low air loss mattress.

## 2019-03-01 NOTE — PROGRESS NOTES
Bedside report received.  Pt transferred from S620-2 to T834-2 on bed. Placed on monitor, monitor room notified, pt is 's.  Patient A&O x 4 currently requiring 2L via NC.  Complains of SOB but states that it is improving.  POC discussed with patient.  Patient verbalized understanding.  Call light and belongings with in reach.  Bed locked and in lowest position, alarm and fall precautions in place.

## 2019-03-01 NOTE — PROGRESS NOTES
2 RN skin check complete with JIM Sanchez     · Devices in place:  Nasal canula, glasses  · Skin assessed under devices:  YES  · Confirmed wounds: BL LE wounds  · New potential pressure ulcers: none found, redness behind ears from glasses, blanching   · Wound consult: yes  · Wound reported and pictures uploaded: yes     Scattered bruising and scabbing on found on BL LE. Scab on middle of back. BL LE have wounds that are dressed and intact; unable to assess underneath. BL ears have redness behind but are blanching. BL heels pink and slow blanching, Sacrum discolored, pink and slow to veda. R elbow mepilex for comfort, float boats and waffle cushion in place. Cheek stickers for oxygen tubing.

## 2019-03-01 NOTE — THERAPY
PT tx attempted; pt with supine resting RR of 42 breaths/min. Will hold mobility until more medically appropriate.

## 2019-03-01 NOTE — THERAPY
"Occupational Therapy Evaluation completed.   Functional Status:  Pt ambulating w/nsg out of bathroom w/HHA, unsteady, had just completed shower, txf BTB w/mod A. CNA reports max A for shower task. Pt was more alert following commands and appropriate w/questions. Max A LB dressing. Concern for pts spouse not being able to provide care no family present to discuss  Plan of Care: Will benefit from Occupational Therapy 2 times per week  Discharge Recommendations:  Equipment: Will Continue to Assess for Equipment Needs. Post-acute therapy TBD     See \"Rehab Therapy-Acute\" Patient Summary Report for complete documentation.      78 yr old female admitted for n/v. Hx of CHF, HTN, and GERD. Dx w/ CHF exacerbation EF 25%, small bowel ischemia, and dehydration. Pt is demonstrating generalized weakness, impaired balance, and activity tolerance impacting ability to complete ADL's, unclear if family is able to assist w/ADL's safely for home w/hospice may require placement   "

## 2019-03-01 NOTE — WOUND TEAM
"[facility]  Inpatient Services  Initial Wound and Skin Care Evaluation    Admission Date: 2/23/2019     Consult Date: 2/24/19   HPI, PMH, SH: Reviewed    Unit where seen by Wound Team: S620/02     WOUND CONSULT RELATED TO:  Evaluation of bilateral lower leg wounds     SUBJECTIVE:  \"My legs swell and these blisters come\"      Self Report / Pain Level:  Tender with care       OBJECTIVE:  RLE is more red and edematous with 3 wounds    WOUND TYPE, LOCATION, CHARACTERISTICS (Pressure Injuries: location, stage, POA or date identified)    Wound Image                        Site Assessment Clean;Intact   Shana-wound Assessment Pink, intact   Margins Attached edges   Tunneling 0 cm   Undermining 0 cm   Closure Secondary intention   Drainage Amount moderate   Drainage Description Serosanguineous;Yellow   Non-staged Wound Description Full thickness   Treatments Cleansed;Irrigation;Site care   Cleansing Normal Saline Irrigation   Periwound Protectant NA   Dressing Options Adhesive Foam;Hydrofiber Silver;Roll Gauze;Dry Gauze   Dressing Cleansing/Solutions Not Applicable   Dressing Changed New   Dressing Status Clean;Dry;Intact   Dressing Change Frequency Every 72 hrs   NEXT Dressing Change  03/1/19   NEXT Weekly Photo (Inpatient Only) 02/27/19   WOUND NURSE ONLY - Odor Mild   WOUND NURSE ONLY - Exposed Structures None   WOUND NURSE ONLY - Tissue Type and Percentage red/yellow 90/10%   WOUND NURSE ONLY - Time Spent with Patient (mins) 60     Vascular:  Unable due to edema    Lab Values:    WBC:       WBC   Date/Time Value Ref Range Status   02/25/2019 02:06 AM 10.6 4.8 - 10.8 K/uL Final     AIC:    No results found for: HBA1C      Culture:   NA    INTERVENTIONS BY WOUND TEAM:  Met with patient and discussed history of wounds.  She reports that her  helps her at home.  She has chosen to not have any surgical intervention with her heart which is cause of lower extremity swelling and currently undecided if " going home with hospice or home health.  Removed old dressings and cleansed wounds with NS gauze.  Measurements taken and covered wounds with pieces of silver hydrofiber and secured with adhesive foam and roll gauze.  Replaced heel float boots.  Discussed with staff RN.     Dressing selection:  See above         Interdisciplinary consultation: Patient, Bedside RN     EVALUATION: open blister wounds due to swelling that should improve with care    Factors affecting wound healing: CHF  Goals: Steady decrease in wound area and depth weekly.    NURSING PLAN OF CARE ORDERS (X):    Dressing changes: See Dressing Care orders: X  Skin care: See Skin Care orders:   Rectal tube care: See Rectal Tube Care orders:   Other orders:    RSKIN: CURRENT (X) ORDERED (O):   Q shift Roosevelt:  X  Q shift pressure point assessments:  X  Pressure redistribution mattress  X          ESTEFANÍA          Bariatric ESTEFANÍA         Bariatric foam           Heel float boots   X       Float Heels off Bed with Pillows               Barrier wipes         Barrier Cream         Barrier paste          Sacral silicone dressing         Silicone O2 tubing         Anchorfast         Cannula fixation Device (Tender )          Gray Foam Ear protectors           Trach with Optifoam split foam                 Waffle cushion        Waffle Overlay     X    Rectal tube or BMS         Antifungal tx      Interdry          Reposition q 2 hours   X     Up to chair        Ambulate      PT/OT        Dietician        Diabetes Education      PO  X   TF     TPN     NPO   # days   Other        WOUND TEAM PLAN OF CARE (X):   NPWT change 3 x week:        Dressing changes by wound team:       Follow up as needed:   X    Other (explain):     Anticipated discharge plans (X):   SNF:           Home Care:           Outpatient Wound Center:            Self Care:            Other:     TBD

## 2019-03-01 NOTE — WOUND TEAM
Wound consult placed for evaluation of bilateral ear pressure injuries.  Nasal cannula is now secured with cheek securement devices and ears are pink and intact.  Discussed with staff RN.  Lower extremity wounds improving per photos taken recently.

## 2019-03-02 NOTE — PROGRESS NOTES
· 2 RN skin check complete with JIM De La Cruz.  · Devices in place BLE dressing, oxygen tubing.  · Skin assessed under devices: yes.  · The following interventions in place low air loss mattress, silicone O2 tubing offloaded, float boots in place, dressing for prevention to bilat elbows, Q 2 hour turns, barrier paste to sacrum.      Bilat Ears: red, blanching  Bilat Elbows: red, slow to veda, foam in place to right elbow  Sacrum: Red, slow to veda  Bilat Heels: boggy, red, blanchable

## 2019-03-02 NOTE — RESPIRATORY CARE
Respiratory Rapid Response Note    Symptoms hypotension/ obtunded     Breath Sounds  RUL Breath Sounds: crackls  RML Breath Sounds: Crackles;Diminished   RLL Breath Sounds: Diminished   SOURAV Breath Sounds: crackles  LLL Breath Sounds: Diminished                 O2 (LPM): 7   O2 Daily Delivery Respiratory : OxyMask        Transferred to ICU no/ plan for hospice

## 2019-03-02 NOTE — PROGRESS NOTES
[facility] Medicine Daily Progress Note    Date of Service  3/2/2019    Chief Complaint  78 y.o. female admitted 2/23/2019 with nausea vomiting and shortness of breath    Hospital Course    Past medical history of severe aortic stenosis who does not want intervention, presented with complaint of nausea vomiting and shortness of breath.  Patient was noticed to have acute on chronic CHF exacerbation due to severe aortic stenosis and reduced ejection fraction.  Cardiologist is consulted and recommend palliative and potential hospice candidate.  Patient was also noticed to have ischemic bowel probably chronic and cannot explain nausea vomiting.  Patient does not want any intervention either.  General surgery was consulted and appreciate consultation.  Transferred to medical floor 2/28, rapid response team called overnight for tachypnea and tachycardia.      Interval Problem Update  3/1--patient returns back to telemetry floor after CODE BLUE called for rapid heart rate events, tachypneic into the 50s and heart rate into 130s.  Patient transferred initially as she had been stable with severe aortic stenosis and heart failure but plan was for eventual hospice care.  There have been some social barriers with family keeping her from being discharged to hospice.    Patient more pleasant this morning, talkative, no acute complaints of chest pain or palpitations.  Heart rate in 120s on exam.     3/2  743am -- called by RN for sustained sinus tach 180s, unclear if patient is dry or overloaded, BNP >5000, only started light lasix regimen given pt small size. Lungs congested on exam, after 10mg dilt IV HR improved to 110s but patient uncomfortable, tachypnic. Trial enalaprilat x 1, nitro SL x 1, lasix 20mg IV now. Needs afterload reduction, given critical AS and HF.     93am--RRT called for tachypnic and hypotensive, had just given enalaprilat and ativan for patient comfort, now patient lethargic, Don () at bedside, long  talk regarding patient, most care futile at this point. Flumazenil given x 1 for sedation reversal, minimal effect, BP 90s, HR 120s.     Consultants/Specialty  General surgery and cardiologist -signed off    Code Status  DNR    Disposition  Working on hospice disposition    Review of Systems  Review of Systems      Physical Exam  Temp:  [36.1 °C (97 °F)-37.1 °C (98.8 °F)] 36.6 °C (97.9 °F)  Pulse:  [120-137] 120  Resp:  [16-42] 16  BP: ()/(63-91) 88/63  SpO2:  [94 %-99 %] 99 %    Physical Exam   Constitutional: No distress.   HENT:   Head: Normocephalic and atraumatic.   Mouth/Throat: Oropharynx is clear and moist.   Poor dentition   Eyes: Conjunctivae and EOM are normal. Right eye exhibits no discharge. Left eye exhibits no discharge. No scleral icterus.   Opens eyes to voice   Neck: Normal range of motion. Neck supple.   Cardiovascular: Normal rate, regular rhythm and intact distal pulses.    Murmur heard.  Pulmonary/Chest: Effort normal. No stridor. No respiratory distress. She has no wheezes. She has no rales.   Abdominal: Soft. Bowel sounds are normal. She exhibits no distension. There is no tenderness. There is no rebound.   Musculoskeletal: She exhibits no tenderness.   Neurological: She is alert. No cranial nerve deficit.   Lethargic throughout this morning   Skin: Skin is warm. No rash noted. She is not diaphoretic. There is erythema.   Anterior shin wounds, bandages cdi, painful to touch   Psychiatric: She has a normal mood and affect. Her behavior is normal.   Nursing note and vitals reviewed.    Fluids    Intake/Output Summary (Last 24 hours) at 03/02/19 1003  Last data filed at 03/02/19 0400   Gross per 24 hour   Intake              120 ml   Output              950 ml   Net             -830 ml       Laboratory  Recent Labs      03/01/19   1508  03/02/19   0335   WBC  9.3  8.8   RBC  5.13  4.95   HEMOGLOBIN  16.4*  15.8   HEMATOCRIT  52.0*  50.1*   MCV  101.4*  101.2*   MCH  32.0  31.9   MCHC   31.5*  31.5*   RDW  58.8*  58.4*   PLATELETCT  217  174   MPV  10.9  11.2     Recent Labs      03/01/19   1508  03/02/19   0335   SODIUM  137  139   POTASSIUM  4.5  4.6   CHLORIDE  101  101   CO2  22  22   GLUCOSE  186*  141*   BUN  30*  42*   CREATININE  0.78  0.85   CALCIUM  9.9  9.4         Recent Labs      03/01/19   1508   BNPBTYPENAT  >5000*           Imaging  DX-CHEST-PORTABLE (1 VIEW)   Final Result      Stable chest with cardiac silhouette enlargement, pulmonary edema and possible layering effusions. Superimposed pneumonia or aspiration is not excluded      EC-ECHOCARDIOGRAM COMPLETE W/O CONT   Final Result      [id number] VIEW   Final Result      No evidence of bowel dilatation.      Pneumatosis.      CT-ABDOMEN-PELVIS W/O   Final Result      Pneumatosis involving multiple loops of small bowel. This is worrisome for ischemia.      No portal venous gas or free intraperitoneal air is identified.      There may be wall thickening of the ascending colon and cecum which may be infectious/inflammatory.      Nonvisualization of the appendix, limiting evaluation.      Colonic diverticulosis.      Cholelithiasis. There may be mild gallbladder wall thickening. Further evaluation can be obtained with right upper quadrant ultrasound.      Prominent atherosclerotic plaque.      Small to moderate right and trace left pleural effusions.      Extensive airspace opacities bilaterally likely represent pulmonary edema. Multifocal pneumonia is not excluded.      Prominent cardiomegaly.      Findings discussed with Dr. Saba.         DX-CHEST-PORTABLE (1 VIEW)   Final Result         1.  Pulmonary edema and/or infiltrates are identified, which appear somewhat increased since the prior exam.   2.  Trace right pleural effusion   3.  Cardiomegaly   4.  Atherosclerosis           Assessment/Plan  * Acute on chronic combined systolic and diastolic congestive heart failure (HCC)- (present on admission)   Assessment & Plan    Patient  initial presented with shortness of breath and weakness  Patient chest x-ray showing pulmonary edema  Patient's BNP is greater than 5000  Echocardiogram reported ejection fraction 25% with severe aortic stenosis  Patient currently does not want any invasive intervention.  Plan is for hospice, may require inpatient hospice versus SNF    Suspect worsening acute on chronic heart failure exacerbation in setting of severe aortic stenosis  Inserted Kelly for patient comfort and strict I's and O's  BNP >5k once again  Diuresis limited by hypotension  RRT called 3/2  Trial of enalaprilat x 1 for afterload reduction, severe AS and HF endless cycle of complication  Suspect end is near for patient, pt is critically ill with hypotension and tachycardia, tachypnic but lungs clear in bases  Will discuss with  regarding comfort measures     DNR (do not resuscitate)- (present on admission)   Assessment & Plan    Previous physician discussed extensively with patient and patient's family is regarding code status and plan of care. Confirmed with DNR.       Chronic diastolic congestive heart failure (HCC)   Assessment & Plan    Patient now moving towards hospice.     Aortic stenosis, severe- (present on admission)   Assessment & Plan    Severe aortic stenosis  QuantiFERON gold negative  Hospice had been planned when could be arranged, believe patient will pass in hospital      Small bowel ischemia (HCC)- (present on admission)   Assessment & Plan    CT abdomen confirmed the patient has small bowel ischemia.  However patient currently has mild abdominal pain.  Patient does not want any surgery.  Appreciate general surgery consultation recommendation, conservative treatment.  High risk for mortality.       Hypercalcemia   Assessment & Plan    Suspect due to volume loss.  Monitor with fluid therapy.      Nausea and vomiting- (present on admission)   Assessment & Plan    Unclear etiology, with long-standing chronicity of 3 months  reportedly.   CT abdomen confirmed the patient has small bowel ischemia.  Patient does not want any surgery. High risk for mortality. Patient critically ill.        Dehydration   Assessment & Plan    With elevated BUN and increased Crea from baseline, in the setting of reported history of nausea and vomiting for past 3 months.      Essential hypertension, benign   Assessment & Plan    Controlled with current medication regimen.  Monitor.       GERD (gastroesophageal reflux disease)- (present on admission)   Assessment & Plan    Anticipate comfort care soon     Headache, migraine- (present on admission)   Assessment & Plan    Continue to monitor, seems to have resolved        VTE prophylaxis: SCD      Current Facility-Administered Medications:   •  nitroglycerin (NITROSTAT) tablet 0.4 mg, 0.4 mg, Sublingual, Once, Joni Flores M.D., Stopped at 03/02/19 0800  •  acetaminophen (TYLENOL) tablet 650 mg, 650 mg, Oral, Q6HRS, Joni Flores M.D.  •  FLUMAZENIL 0.5 MG/5ML IV SOLN, , , ,   •  FLUMAZENIL 0.5 MG/5ML IV SOLN, , , ,   •  SODIUM CHLORIDE 0.9 % IV SOLN, , , ,   •  NS (BOLUS) infusion 500 mL, 500 mL, Intravenous, Once, Joni Flores M.D.  •  gabapentin (NEURONTIN) capsule 100 mg, 100 mg, Oral, TID, Erin Rdz M.D., 100 mg at 03/02/19 0621  •  aspirin EC (ECOTRIN) tablet 81 mg, 81 mg, Oral, DAILY, Shay Jorge M.D., 81 mg at 03/02/19 0621  •  gemfibrozil (LOPID) tablet 600 mg, 600 mg, Oral, BID, Shay Jorge M.D., 600 mg at 03/02/19 0621  •  tizanidine (ZANAFLEX) tablet 4 mg, 4 mg, Oral, QHS PRN, Shay Jorge M.D.  •  omeprazole (PRILOSEC) capsule 20 mg, 20 mg, Oral, DAILY, Shay Jorge M.D., 20 mg at 03/02/19 0620  •  ondansetron (ZOFRAN) syringe/vial injection 4 mg, 4 mg, Intravenous, Q4HRS PRN, Shay Jorge M.D.  •  ondansetron (ZOFRAN ODT) dispertab 4 mg, 4 mg, Oral, Q4HRS PRN, Shay Jorge M.D., 4 mg at 02/24/19 0548  •  senna-docusate (PERICOLACE or SENOKOT S) 8.6-50 MG per tablet 2  Tab, 2 Tab, Oral, BID, 2 Tab at 03/02/19 0621 **AND** polyethylene glycol/lytes (MIRALAX) PACKET 1 Packet, 1 Packet, Oral, QDAY PRN **AND** magnesium hydroxide (MILK OF MAGNESIA) suspension 30 mL, 30 mL, Oral, QDAY PRN **AND** bisacodyl (DULCOLAX) suppository 10 mg, 10 mg, Rectal, QDAY PRN, Shay Jorge M.D.      Upon my evaluation, this patient had a high probability of imminent or life-threatening deterioration due to hypotension due to critical aortic stenosis with worsening systolic heart function which required my direct attention, intervention, and personal management. Rapid respond team called in on 3/2/2019. I have personally provided over 45 minutes of critical care time exclusive of time spent on separately billable procedures. Time includes review of laboratory data, radiology results, discussion with consultants, and monitoring for potential decompensation.    Interventions and management performed as documented above.   Time spent from 9am through 950am.

## 2019-03-02 NOTE — PROGRESS NOTES
· 2 RN skin check complete with JIM Bui.  · Devices in place include, silicone nasal cannula, bilateral lower extremity dressings, float boots when compliant.  · Skin assessed under devices.  · The following interventions in place low airloss mattress, oxygen tubing offloaded from pressure areas, float boots in place while compliant and educated when refused, turns every two hours, foam dressing to right elbow, bilateral lower extremity dressings assessed for integrity and addressed per wound care directions, and barrier paste to sacrum and bladimir area.       Bilateral ears are red and blanchable.    Bilateral elbows are red and blanchable with foam dressing covering right elbow.     Sacrum is deep red but blanchable although cap refill time is greater than 3 seconds.    Bilateral heels are boggy, red, blanchable but patient has declined float boots despite recommendation and education.

## 2019-03-02 NOTE — CODE DOCUMENTATION
No further interventions wanted at this time.  at the bedside. MD and RN's will continue to check in.

## 2019-03-02 NOTE — PROGRESS NOTES
Pt transferred to private room with  at bedside. Pt now comfort care.  coping appropriately and holding pt's hand. This RN offered emotional support. Family members have called this RN and  has talked to family.  states it is okay to give information to family members about comfort care status when they call. Telemetry monitor removed. Oxygen still on. Previous bolus completed before comfort care order. Vitals will be Q shift. Will turn Q2. Pt on low mattress air flow mattress. Bed alarm on. Bed low and locked. Call light on bed, but bed close to nursing station for close monitoring.

## 2019-03-02 NOTE — PROGRESS NOTES
Patient unresponsive, change in condition, MD paged to bedside. Rapid team on floor, called to bedside, official rapid called.  MD to bedside. 0.1 mg Romazicon give per MD order, 500ml NS bolus administered per MD order, oxygen increase to 6 lpm via face mask.  Patient  arrived bedside.  Will continue to monitor.      Gave report to Marcie FERNANDEZ.  Patient transferred from room T834-2 to T818, via hospital bed.  All medication, personal belongings and chart with patient to new room.

## 2019-03-02 NOTE — PROGRESS NOTES
Assumed care from Jeannie FERNANDEZ. Received bedside report.  Bed alarm on and working appropriately.  Received report that patient is -180 sustaining.  MD paged. Received orders for Diltiazem 10 mg IV push x 1 dose to give now.  Administered, .  Will continue to monitor.

## 2019-03-03 NOTE — DISCHARGE SUMMARY
Death Summary    Cause of Death  Acute respiratory failure due to worsening fluid overload due to critical acute systolic heart failure due to severe aortic stenosis.    Comorbid Conditions at the Time of Death  Principal Problem:    Acute on chronic combined systolic and diastolic congestive heart failure (HCC) POA: Yes  Active Problems:    Aortic stenosis, severe POA: Yes    DNR (do not resuscitate) POA: Yes    Small bowel ischemia (HCC) POA: Yes    Headache, migraine POA: Yes    GERD (gastroesophageal reflux disease) POA: Yes    Nausea and vomiting POA: Yes  Resolved Problems:    * No resolved hospital problems. *      History of Presenting Illness and Hospital Course  This is a 78 y.o. female admitted 2/23/2019 with past medical history of severe aortic stenosis who does not want intervention, presented with complaint of nausea vomiting and shortness of breath.  Patient was noticed to have acute on chronic CHF exacerbation due to severe aortic stenosis and reduced ejection fraction.  Cardiologist is consulted recommend palliative and potential hospice candidate.  Patient was also noticed to have ischemic bowel probably chronic, she declined intervention as well. Transferred to medical floor 2/28 as we are awaiting outpatient hospice arrangements, however rapid response team called overnight for tachypnea and tachycardia.  Patient transferred back to telemetry floor, found to be in respiratory distress with BNP greater than 5000, diuresis had no effect.  Given the critical nature of her illness, I contacted her  Don who came to bedside to see patient.  It was expected that death was imminent.  Patient passed away overnight likely from respiratory failure due to underlying aortic stenosis and heart failure.    Death Date: 03/03/19   Death Time: 0245         Pronounced By (RN1): Ivone Bell RN  Pronounced By (RN2): Jaye Gómez RN

## 2019-03-03 NOTE — PROGRESS NOTES
Assumed care of patient, bedside report received. Pt sleeping comfortably, minimal work of breathing. Skin warm, reece draining. Will continue to monitor

## 2019-03-03 NOTE — PROGRESS NOTES
Attempted to contact family since pts time of death using multiple phone numbers listed in pts chart. On call  made aware of inability to reach family, assisting as well. Will continue to attempt contact.

## 2019-03-03 NOTE — PROGRESS NOTES
RN able to get in touch with pts sister, Tejal Mcarthur (listed as second emergency contact in chart), who was notified of pts passing. Tejal provided additional phone numbers to get in touch with pts , Kavin.     [facility] where Kavin is [phone]  Other numbers: [phone], [phone], [phone]. All numbers attempted without success.

## 2019-03-03 NOTE — PROGRESS NOTES
Spoke with Jimmy, pt's significant other, and informed him of pt's passing.  All questions answered and gave Jimmy the number for the NAM to call with further questions.    Jimmy gave [phone] as phone number to reach him.  Jimmy repeated this number multiple times (Rn know that this number has 11 [phone]) and told RN that this was the number to call.    Jimmy lives at [facility].

## 2019-03-03 NOTE — PROGRESS NOTES
Pt with increased secretion production, minimal effect from atropine drops. Respirations more shallow. MD paged for orders for secretion management.

## 2022-09-23 NOTE — PROGRESS NOTES
Assumed care of patient, report received from Phill FERNANDEZ. Pt is SR 89 bpm on the monitor. I agree w/ previous RNs assessment, orders reviewed, call light within reach, and hourly rounding in place.   full weight-bearing